# Patient Record
Sex: FEMALE | Race: WHITE | NOT HISPANIC OR LATINO | Employment: OTHER | URBAN - METROPOLITAN AREA
[De-identification: names, ages, dates, MRNs, and addresses within clinical notes are randomized per-mention and may not be internally consistent; named-entity substitution may affect disease eponyms.]

---

## 2017-01-23 ENCOUNTER — TRANSFERRED RECORDS (OUTPATIENT)
Dept: HEALTH INFORMATION MANAGEMENT | Facility: CLINIC | Age: 57
End: 2017-01-23

## 2017-03-22 ENCOUNTER — TRANSFERRED RECORDS (OUTPATIENT)
Dept: HEALTH INFORMATION MANAGEMENT | Facility: CLINIC | Age: 57
End: 2017-03-22

## 2017-03-23 ENCOUNTER — TRANSFERRED RECORDS (OUTPATIENT)
Dept: HEALTH INFORMATION MANAGEMENT | Facility: CLINIC | Age: 57
End: 2017-03-23

## 2017-03-30 ENCOUNTER — TRANSFERRED RECORDS (OUTPATIENT)
Dept: HEALTH INFORMATION MANAGEMENT | Facility: CLINIC | Age: 57
End: 2017-03-30

## 2017-05-23 ENCOUNTER — SURGERY (OUTPATIENT)
Age: 57
End: 2017-05-23

## 2017-05-23 ENCOUNTER — HOSPITAL ENCOUNTER (OUTPATIENT)
Facility: CLINIC | Age: 57
Discharge: HOME | End: 2017-05-23
Attending: COLON & RECTAL SURGERY | Admitting: COLON & RECTAL SURGERY
Payer: COMMERCIAL

## 2017-05-23 VITALS
BODY MASS INDEX: 29.03 KG/M2 | DIASTOLIC BLOOD PRESSURE: 69 MMHG | HEIGHT: 67 IN | WEIGHT: 185 LBS | SYSTOLIC BLOOD PRESSURE: 113 MMHG | RESPIRATION RATE: 28 BRPM | OXYGEN SATURATION: 99 %

## 2017-05-23 LAB — COLONOSCOPY: NORMAL

## 2017-05-23 PROCEDURE — 25000128 H RX IP 250 OP 636: Performed by: COLON & RECTAL SURGERY

## 2017-05-23 PROCEDURE — 25000125 ZZHC RX 250: Performed by: COLON & RECTAL SURGERY

## 2017-05-23 PROCEDURE — G0105 COLORECTAL SCRN; HI RISK IND: HCPCS | Performed by: COLON & RECTAL SURGERY

## 2017-05-23 PROCEDURE — G0121 COLON CA SCRN NOT HI RSK IND: HCPCS | Performed by: COLON & RECTAL SURGERY

## 2017-05-23 PROCEDURE — 45378 DIAGNOSTIC COLONOSCOPY: CPT | Performed by: COLON & RECTAL SURGERY

## 2017-05-23 PROCEDURE — G0500 MOD SEDAT ENDO SERVICE >5YRS: HCPCS | Performed by: COLON & RECTAL SURGERY

## 2017-05-23 RX ORDER — METOCLOPRAMIDE 10 MG/1
10 TABLET ORAL EVERY 6 HOURS PRN
Status: DISCONTINUED | OUTPATIENT
Start: 2017-05-23 | End: 2017-05-23 | Stop reason: HOSPADM

## 2017-05-23 RX ORDER — ONDANSETRON 2 MG/ML
4 INJECTION INTRAMUSCULAR; INTRAVENOUS EVERY 6 HOURS PRN
Status: DISCONTINUED | OUTPATIENT
Start: 2017-05-23 | End: 2017-05-23 | Stop reason: HOSPADM

## 2017-05-23 RX ORDER — FENTANYL CITRATE 50 UG/ML
INJECTION, SOLUTION INTRAMUSCULAR; INTRAVENOUS PRN
Status: DISCONTINUED | OUTPATIENT
Start: 2017-05-23 | End: 2017-05-23 | Stop reason: HOSPADM

## 2017-05-23 RX ORDER — PROCHLORPERAZINE MALEATE 5 MG
5-10 TABLET ORAL EVERY 6 HOURS PRN
Status: DISCONTINUED | OUTPATIENT
Start: 2017-05-23 | End: 2017-05-23 | Stop reason: HOSPADM

## 2017-05-23 RX ORDER — NALOXONE HYDROCHLORIDE 0.4 MG/ML
.1-.4 INJECTION, SOLUTION INTRAMUSCULAR; INTRAVENOUS; SUBCUTANEOUS
Status: DISCONTINUED | OUTPATIENT
Start: 2017-05-23 | End: 2017-05-23 | Stop reason: HOSPADM

## 2017-05-23 RX ORDER — FLUMAZENIL 0.1 MG/ML
0.2 INJECTION, SOLUTION INTRAVENOUS
Status: DISCONTINUED | OUTPATIENT
Start: 2017-05-23 | End: 2017-05-23 | Stop reason: HOSPADM

## 2017-05-23 RX ORDER — ONDANSETRON 4 MG/1
4 TABLET, ORALLY DISINTEGRATING ORAL EVERY 6 HOURS PRN
Status: DISCONTINUED | OUTPATIENT
Start: 2017-05-23 | End: 2017-05-23 | Stop reason: HOSPADM

## 2017-05-23 RX ORDER — LIDOCAINE 40 MG/G
CREAM TOPICAL
Status: DISCONTINUED | OUTPATIENT
Start: 2017-05-23 | End: 2017-05-23 | Stop reason: HOSPADM

## 2017-05-23 RX ORDER — ONDANSETRON 2 MG/ML
4 INJECTION INTRAMUSCULAR; INTRAVENOUS
Status: DISCONTINUED | OUTPATIENT
Start: 2017-05-23 | End: 2017-05-23 | Stop reason: HOSPADM

## 2017-05-23 RX ORDER — METOCLOPRAMIDE HYDROCHLORIDE 5 MG/ML
10 INJECTION INTRAMUSCULAR; INTRAVENOUS EVERY 6 HOURS PRN
Status: DISCONTINUED | OUTPATIENT
Start: 2017-05-23 | End: 2017-05-23 | Stop reason: HOSPADM

## 2017-05-23 RX ADMIN — MIDAZOLAM HYDROCHLORIDE 2 MG: 1 INJECTION, SOLUTION INTRAMUSCULAR; INTRAVENOUS at 08:47

## 2017-05-23 RX ADMIN — FENTANYL CITRATE 50 MCG: 50 INJECTION, SOLUTION INTRAMUSCULAR; INTRAVENOUS at 08:54

## 2017-05-23 RX ADMIN — MIDAZOLAM HYDROCHLORIDE 1 MG: 1 INJECTION, SOLUTION INTRAMUSCULAR; INTRAVENOUS at 08:55

## 2017-05-23 RX ADMIN — FENTANYL CITRATE 100 MCG: 50 INJECTION, SOLUTION INTRAMUSCULAR; INTRAVENOUS at 08:47

## 2017-05-23 NOTE — H&P
Pre-Endoscopy History and Physical   Rosemarie Zabala MRN# [id number]   YOB: 1960 Age: 57 year old   Date of Procedure: 5/23/2017   Primary care provider: Shoshana Bhatti   Type of Endoscopy: colonoscopy   Reason for Procedure: FH of CRC   Type of Anesthesia Anticipated: Moderate Sedation   HPI:   Rosemarie is a 57 year old female with a FH of CRC.  SHe last had a colonoscopy in 2012 which was normal.  She denies BRBPR, abdominal pain, nausea/vomiting, changes in bowel habits or unintentional weight loss.  Her father had CRC at 64.    Allergies   Allergen Reactions     No Known Allergies       Prior to Admission Medications   Prescriptions Last Dose Informant Patient Reported? Taking?   Ascorbic Acid (VITAMIN C PO) 5/17/2017  Yes No   Sig: Take by mouth daily   Cholecalciferol (VITAMIN D) 1000 UNITS capsule 5/17/2017  Yes No   Sig: Take 1 capsule by mouth daily.   Escitalopram Oxalate (LEXAPRO PO) 5/22/2017  Yes Yes   Sig: Take 30 mg by mouth daily   Omega-3 Fatty Acids (OMEGA 3) 1200 MG capsule 5/17/2017  Yes Yes   Sig: Take 1 capsule by mouth 3 times daily.   atenolol (TENORMIN) 25 MG tablet 5/22/2017  No Yes   Sig: Take 1 tablet (25 mg) by mouth daily   atorvastatin (LIPITOR) 20 MG tablet 5/21/2017  No No   Sig: TAKE ONE TABLET BY MOUTH ONE TIME DAILY   calcium carbonate (OS-VALENTIN 500 MG [place]. [place]) 500 MG tablet Unknown  Yes No   Sig: Take 1,000 mg by mouth daily   clonazePAM (KLONOPIN) 0.5 MG tablet 5/22/2017  No Yes   Sig: TAKE ONE TABLET BY MOUTH TWICE DAILY AS NEEDED FOR ANXIETY   Patient taking differently: 0.25 mg daily      Facility-Administered Medications: None      Patient Active Problem List   Diagnosis     Genital herpes     HYPERLIPIDEMIA LDL GOAL <130     Generalized anxiety disorder      Past Medical History:   Diagnosis Date     Calculus of kidney 1994     Essential hypertension, benign     resolved 2014     Generalised anxiety disorder 2011     Genital herpes 2004     Left knee pain 2005  "   left knee cortisone injection     Mixed hyperlipidemia      Other and unspecified malignant neoplasm of skin of other and unspecified parts of face     left cheek, treated with Aldara cream      Past Surgical History:   Procedure Laterality Date     ARTHROSCOPY KNEE      L Knee ACL repair     C REMOVAL OF KIDNEY STONE      L Kidney stone     C/SECTION, LOW TRANSVERSE      x  2     CHOLECYSTECTOMY, LAPOROSCOPIC      precancerous changes and gallstones     COLONOSCOPY  2012    NL. repeat 5 years + family history cancer     COSMETIC ABDOMINOPLASTY  2013     HC REDUCTION OF LARGE BREAST       SALPINGO OOPHORECTOMY,R/L/ARTIE  1981    right oophorectomy for benign tumor      Social History   Substance Use Topics     Smoking status: Never Smoker     Smokeless tobacco: Never Used     Alcohol use Yes      Comment: 3 drinks a week      Family History   Problem Relation Age of Onset     Depression Mother      Breast Cancer Mother      Gynecology Mother       of ovarian cancer     Hypertension Father      Cancer - colorectal Father      at age 64     Lipids Father      Dementia Father      mild      PHYSICAL EXAM:   /76  Resp 16  Ht 1.702 m (5' 7\")  Wt 83.9 kg (185 lb)  SpO2 98%  BMI 28.98 kg/m2 Estimated body mass index is 28.98 kg/(m^2) as calculated from the following:    Height as of this encounter: 1.702 m (5' 7\").    Weight as of this encounter: 83.9 kg (185 lb).   RESP: lungs clear to auscultation - no rales, rhonchi or wheezes   CV: regular rates and rhythm   ASA Class 2 - Mild systemic disease    Assessment: 56 y/o woman with a FH of CRC    Plan: Colonoscopy with moderate sedation.  Risks of the procedure were discussed including, but not limited to, bleeding, perforation and missed lesions.  Patient understands and is willing to proceed.    Declan Gonzales MD ....................  2017   8:46 AM  Colon and Rectal Surgery Staff  [phone]    "

## 2017-07-26 ENCOUNTER — HOSPITAL ENCOUNTER (OUTPATIENT)
Dept: MAMMOGRAPHY | Facility: CLINIC | Age: 57
Discharge: HOME | End: 2017-07-26
Attending: PHYSICIAN ASSISTANT | Admitting: PHYSICIAN ASSISTANT
Payer: COMMERCIAL

## 2017-07-26 DIAGNOSIS — Z12.31 VISIT FOR SCREENING MAMMOGRAM: ICD-10-CM

## 2017-07-26 PROCEDURE — 77063 BREAST TOMOSYNTHESIS BI: CPT

## 2017-08-03 ASSESSMENT — ENCOUNTER SYMPTOMS
DECREASED CONCENTRATION: 0
SYNCOPE: 0
HYPERTENSION: 1
MUSCLE WEAKNESS: 0
ORTHOPNEA: 0
CONSTIPATION: 1
PALPITATIONS: 0
DISTURBANCES IN COORDINATION: 0
NUMBNESS: 1
BACK PAIN: 1
SEIZURES: 0
NECK PAIN: 0
NERVOUS/ANXIOUS: 1
BLOOD IN STOOL: 0
ARTHRALGIAS: 1
INSOMNIA: 1
MYALGIAS: 1
WEAKNESS: 0
STIFFNESS: 1
RECTAL BLEEDING: 0
EXERCISE INTOLERANCE: 0
CLAUDICATION: 1
DIARRHEA: 0
ABDOMINAL PAIN: 0
DIZZINESS: 1
JAUNDICE: 0
JOINT SWELLING: 0
TACHYCARDIA: 0
TREMORS: 0
NAUSEA: 0
SPEECH CHANGE: 0
SLEEP DISTURBANCES DUE TO BREATHING: 0
TINGLING: 1
LOSS OF CONSCIOUSNESS: 0
LIGHT-HEADEDNESS: 0
MEMORY LOSS: 0
HEADACHES: 0
LEG PAIN: 1
PARALYSIS: 0
DEPRESSION: 1
MUSCLE CRAMPS: 1
BLOATING: 0
RECTAL PAIN: 0
VOMITING: 0
PANIC: 0
BOWEL INCONTINENCE: 0
HYPOTENSION: 0
LEG SWELLING: 0
HEARTBURN: 0

## 2017-08-17 ENCOUNTER — OFFICE VISIT (OUTPATIENT)
Dept: ORTHOPEDICS | Facility: CLINIC | Age: 57
End: 2017-08-17

## 2017-08-17 ENCOUNTER — PRE VISIT (OUTPATIENT)
Dept: NEUROLOGY | Facility: CLINIC | Age: 57
End: 2017-08-17

## 2017-08-17 VITALS — WEIGHT: 195.3 LBS | HEIGHT: 67 IN | BODY MASS INDEX: 30.65 KG/M2

## 2017-08-17 DIAGNOSIS — G89.29 CHRONIC LEFT-SIDED LOW BACK PAIN WITHOUT SCIATICA: ICD-10-CM

## 2017-08-17 DIAGNOSIS — M53.3 SI (SACROILIAC) PAIN: Primary | ICD-10-CM

## 2017-08-17 DIAGNOSIS — M54.50 CHRONIC LEFT-SIDED LOW BACK PAIN WITHOUT SCIATICA: ICD-10-CM

## 2017-08-17 NOTE — NURSING NOTE
"Reason For Visit:   Chief Complaint   Patient presents with     RECHECK     mid to low back pain, review recent MRI from [facility]               Pt. States she had an injection 12/6/16 whish made her pain worse    Primary MD: Shoshana Bhatti  Ref. MD: Dr. 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  Work status?  as able.  Date of injury: none    Date of surgery: none    Smoker: No      Ht 1.695 m (5' 6.73\")  Wt 88.6 kg (195 lb 4.8 oz)  BMI 30.83 kg/m2    Pain Assessment  Patient Currently in Pain: Yes  0-10 Pain Scale:  (5 - 10)  Primary Pain Location: Back  Pain Orientation: Lower, Mid  Pain Descriptors: Burning, Constant, Aching  Alleviating Factors: Other (comment) (nothing)  Aggravating Factors: Sitting, Lying, Standing, Walking    Oswestry (MIKO) Questionnaire    OSWESTRY DISABILITY INDEX 8/3/2017   Section 1 - Pain intensity 4   Section 2 - Personal care (washing, dressing, etc.)  1   Section 3 - Lifting 3   Section 4 - Walking 2   Section 5 - Sitting 3   Section 6 - Standing 3   Section 7 - Sleeping 3   Section 8 - Sex life (if applicable) -1   Section 9 - Social life 3   Section 10 - Traveling 2   Count 9   Sum 24   Oswestry Score (%) 53.33   SECTION 1-PAIN INTENSITY The pain is very severe at the moment.   SECTION 2-PERSONAL CARE (WASHING,DRESSING,ETC.) I can look after myself normally but it is very painful.   SECTION 3-LIFTING Pain prevents me from lifting heavy weights but I can manage light to medium weights if they are conveniently positioned.   SECTION 4-WALKING Pain prevents me from walking more than a quarter of a mile.   SECTION 5-SITTING Pain prevents me from sitting for more than half an hour.   SECTION 6-STANDING Pain prevents me from standing for more than half an hour.   SECTION 7-SLEEPING Because of pain I have less than 4 hours sleep.   SECTION 8-SEX LIFE (IF APPLICABLE) Not answered/NA   SECTION 9-SOCIAL LIFE Pain has restricted my social life and I do not go out " as often.   SECTION 10-TRAVELING Pain is bad but I am able to manage trips over two hours.   Oswestry Disability Index: Count 9   MIKO: Total Score = SUM (total for answered questions) 24   Computed Oswestry Score 53.33 (%)   Some recent data might be hidden                    Numeric Rating Scale:  VAS Scores     VAS Survey 8/3/2017   What is your level of back pain during the last week: 8.5   What is your level of RIGHT leg pain during the last week: 4.5   What is your level of LEFT leg pain during the last week: 7.5   What is your level of neck pain during the last week: 0   What is your level of RIGHT arm pain during the last week: 2.5   What is your level of LEFT arm pain during the last week: 4                Promis 10 Assessment    PROMIS 10 8/3/2017   In general, would you say your health is: Good   In general, would you say your quality of life is: Good   In general, how would you rate your physical health? Good   In general, how would you rate your mental health, including your mood and your ability to think? Fair   In general, how would you rate your satisfaction with your social activities and relationships? Fair   In general, please rate how well you carry out your usual social activities and roles Fair   To what extent are you able to carry out your everyday physical activities such as walking, climbing stairs, carrying groceries, or moving a chair? Mostly   How often have you been bothered by emotional problems such as feeling anxious, depressed or irritable? Often   How would you rate your fatigue on average? Moderate   How would you rate your pain on average?   0 = No Pain  to  10 = Worst Imaginable Pain 8   Global Physical Health Score : Raw Score 12 (SUM : G03 - G06 - G07 - G08)   Global Mentall Health Score : Raw Score 9 (SUM : G02 - G04 - G05 - G10)   Total (Physical + Mental Health Score) 21   In general, would you say your health is: 3   In general, would you say your quality of life is: 3   In  general, how would you rate your physical health? 3   In general, how would you rate your mental health, including your mood and your ability to think? 2   In general, how would you rate your satisfaction with your social activities and relationships? 2   In general, please rate how well you carry out your usual social activities and roles. (This includes activities at home, at work and in your community, and responsibilities as a parent, child, spouse, employee, friend, etc.) 2   To what extent are you able to carry out your everyday physical activities such as walking, climbing stairs, carrying groceries, or moving a chair? 4   In the past 7 days, how often have you been bothered by emotional problems such as feeling anxious, depressed, or irritable? 2   In the past 7 days, how would you rate your fatigue on average? 3   In the past 7 days, how would you rate your pain on average, where 0 means no pain, and 10 means worst imaginable pain? 8   Global Mental Health Score 9   Global Physical Health Score 12   PROMIS TOTAL - SUBSCORES 21   Pain question re-calculation - no clinical value 2   Some recent data might be hidden                Jo Ann Lim LPN

## 2017-08-17 NOTE — MR AVS SNAPSHOT
After Visit Summary   8/17/2017    Rosemarie Zabala    MRN: [id number]           Patient Information     Date Of Birth          1960        Visit Information        Provider Department      8/17/2017 8:45 AM Joel Hemphill MD [facility]        Today's Diagnoses     SI (sacroiliac) pain    -  1    Chronic left-sided low back pain without sciatica           Follow-ups after your visit        Additional Services     [facility]       Your provider has referred you to: [facility]. Please call [phone] to make an appointment.     Clinic is located: [facility] [address] [place] [address] [id number]th [place] [address]      Please complete the following questions:    Procedure/Referral: Referral Only -  Comprehensive Evaluation and Management    What is your diagnosis for the patient's pain? Right sided SIJ pain, right L4/5 facet cyst, fact arthropathy    What are your specific questions for the pain specialist? Multimodal eval and treatment    Are there any red flags that may impact the assessment or management of the patient? None    REGARDING OPIOID MEDICATIONS:  We will always address appropriateness of opioid pain medications. We do not prescribe on the patient's first visit and generally will not take on a prescribing role for stable chronic pain. When we do take on prescribing of opioids for chronic pain, it is in collaboration with the referring physician for an determined period of time (usually weeks to months), with an expectation that the primary physician or provider will assume the prescribing role if medications are effective at stable doses with demonstrated compliance.  Therefore, please do not assume that your prescribing responsibilities end on the day of pain clinic consultation.  Is this agreeable to you? YES      Please be aware that coverage of  these services is subject to the terms and limitations of your health insurance plan.  Call member services at your health plan with any benefit or coverage questions.      Please bring the following with you to your appointment or have sent to the [facility]:    (1) Any X-Rays, CTs or MRIs which have been performed that are not in Epic.  Contact the facility where they were done to arrange for  prior to your scheduled appointment.  Any new CT, MRI or other procedures ordered by your specialist must be performed at a [facility] facility or coordinated by your clinic's referral office.    (2) List of current medications   (3) This referral request   (4) Any documents/labs given to you for this referral            NEUROLOGY ADULT REFERRAL       Your provider has referred you for the following:   Consult at PREFERRED PROVIDERS: Farhan Archer MD    Please be aware that coverage of these services is subject to the terms and limitations of your health insurance plan.  Call member services at your health plan with any benefit or coverage questions.      Please bring the following with you to your appointment:    (1) Any X-Rays, CTs or MRIs which have been performed.  Contact the facility where they were done to arrange for  prior to your scheduled appointment.    (2) List of current medications  (3) This referral request   (4) Any documents/labs given to you for this referral                  Your next 10 appointments already scheduled     Aug 24, 2017 11:30 AM CDT   (Arrive by 11:15 AM)   New Patient Visit with Christen Richardson MD   [facility] ([facility])    [address] [place] [address]  4th Floor  [facility] [address]   [phone]            Sep 20, 2017 12:50 PM CDT   (Arrive by 12:35 PM)   New Patient Visit with Lety Feng MD   [facility] ([facility])    [address] [place] [address]  3rd Floor  [place]  "[id number] [phone]              Who to contact     Please call your clinic at [phone] to:    Ask questions about your health    Make or cancel appointments    Discuss your medicines    Learn about your test results    Speak to your doctor   If you have compliments or concerns about an experience at your clinic, or if you wish to file a complaint, please contact [facility] Patient Relations at [phone] or email us at [email or web address]         Additional Information About Your Visit        [employer] Exped[employer] Information     rankdeskt gives you secure access to your electronic health record. If you see a primary care provider, you can also send messages to your care team and make appointments. If you have questions, please call your primary care clinic.  If you do not have a primary care provider, please call [phone] and they will assist you.      Sumbola is an electronic gateway that provides easy, online access to your medical records. With [employer], you can request a clinic appointment, read your test results, renew a prescription or communicate with your care team.     To access your existing account, please contact your [facility] or call [phone] for assistance.        Care EveryWhere ID     This is your Care EveryWhere ID. This could be used by other organizations to access your [facility] medical records  [id number]        Your Vitals Were     Height BMI (Body Mass Index)                1.695 m (5' 6.73\") 30.83 kg/m2           Blood Pressure from Last 3 Encounters:   05/23/17 113/69   02/23/15 128/80   09/12/14 137/85    Weight from Last 3 Encounters:   08/17/17 88.6 kg (195 lb 4.8 oz)   05/23/17 83.9 kg (185 lb)   10/06/16 83.7 kg (184 lb 9.6 oz)              We Performed the Following     [facility]AL     NEUROLOGY ADULT REFERRAL          Today's Medication Changes          These changes " are accurate as of: 8/17/17 10:19 AM.  If you have any questions, ask your nurse or doctor.               These medicines have changed or have updated prescriptions.        Dose/Directions    clonazePAM 0.5 MG tablet   Commonly known as:  klonoPIN   This may have changed:    - how much to take  - additional instructions   Used for:  Generalised anxiety disorder        TAKE ONE TABLET BY MOUTH TWICE DAILY AS NEEDED FOR ANXIETY   Quantity:  40 tablet   Refills:  1                Primary Care Provider Office Phone # Fax #    Shoshana Bhatti PA-C [phone]       FIONA NICOLLET [place] [address]  [facility] [address]        Equal Access to Services     [facility]: Hadii roselyn sauceda hadasho Soomaali, waaxda luqadaha, qaybta kaalmada adeegyada, fariha hoff . So [facility] [phone].    ATENCIÓN: Si habla español, tiene a garcia disposición servicios gratuitos de asistencia lingüística. [facility] [phone].    We comply with applicable federal civil rights laws and [place] laws. We do not discriminate on the basis of race, color, national origin, age, disability sex, sexual orientation or gender identity.            Thank you!     Thank you for choosing [facility]  for your care. Our goal is always to provide you with excellent care. Hearing back from our patients is one way we can continue to improve our services. Please take a few minutes to complete the written survey that you may receive in the mail after your visit with us. Thank you!             Your Updated Medication List - Protect others around you: Learn how to safely use, store and throw away your medicines at www.disposemymeds.org.          This list is accurate as of: 8/17/17 10:19 AM.  Always use your most recent med list.                   Brand Name Dispense Instructions for use Diagnosis    atenolol 25 MG tablet    TENORMIN    90 tablet    Take 1 tablet (25 mg) by mouth daily    Benign essential  hypertension       atorvastatin 20 MG tablet    LIPITOR    90 tablet    TAKE ONE TABLET BY MOUTH ONE TIME DAILY    Hyperlipidemia LDL goal <130       calcium carbonate 1250 MG tablet    OS-VALENTIN 500 mg [place]. [place]     Take 1,000 mg by mouth daily        clonazePAM 0.5 MG tablet    klonoPIN    40 tablet    TAKE ONE TABLET BY MOUTH TWICE DAILY AS NEEDED FOR ANXIETY    Generalised anxiety disorder       LEXAPRO PO      Take 30 mg by mouth daily        omega-3 fatty acids 1200 MG capsule      Take 1 capsule by mouth 3 times daily.        TYLENOL WITH CODEINE #3 PO      Take 2 tablets by mouth At Bedtime        VITAMIN C PO      Take by mouth daily        vitamin D 1000 UNITS capsule      Take 1 capsule by mouth daily.

## 2017-08-17 NOTE — TELEPHONE ENCOUNTER
1.  Date/reason for appt: 9/20/17 Low Extremities Nerve issues  2.  Referring provider: CORY RICHARDS   3.  Call to patient (Yes / No - short description): spoke with    4.  Previous care at / records requested from: [facility]   5.  Other: Faxed request.

## 2017-08-17 NOTE — PROGRESS NOTES
Chief Concern/Diagnosis: Low back pain, right-sided SI joint pain, left leg weakness and numbness    Prior Surgeries:    1. None    Subjective:  Pleasant 57 female returns to clinic today for follow-up of the apprehension. She was last seen in our clinic on October 6, 2016 where she is primarily complaining of right-sided SI joint pain. She did undergo right-sided SI joint injection on December 6, 2016 which she states gives her at least 50% relief however for an unknown time period. In the interim since her last visit, she is also been seen and evaluated by Dr. Zabala and Dr. Verdugo at [facility], who both felt that her primary pain generator was her right SI joint. However, they explained as we had previously, that her insurance does not cover surgical intervention and therefore should continue nonoperative management. At that, she went to [facility] where she underwent what she states as 5 injections of PRP and amniotic fluid at unknown sites. She states that since these injections her pain has been significantly worse, has not improved since the injection, and is now experiencing new pain in her left lower extremity that she had not had previously. She states she's been trying to contact Dr. Femi Razo since these injections however, his office has been evasive in terms of providing information with regard to the procedure she's had.  She notes no improvement of symptoms since this injection with Dr. Razo. She states she has tried multiple attempts at acupuncture, PT, massage, and chiropractic visits.  She is currently taking two tylenol 3 at night and ibuprofen through the day for pain.  Pain is worse with any activity and she cannot identify anything that provides relief.   She states the pain wakes her from sleep and limits her ability to do ADLs, as well as activity of enjoyment including gardening.   And presents today for further evaluation and treatment. She  states her main goals at today's visit are to establish care with [facility] pain clinic and discuss whether or not any surgical intervention can help with her symptoms.  Outside of these complaints, she has no other questions or concerns.      MIKO:  53.33%  VAS:  8.5 back, RLE 4.5, LLE 7.5  [id number]:     General physical health: 12   General mental health:  9    Physical Examination:    Gen:  Alert, no acute distress, well nourished, appears stated age    Psych:  Tearful on exam    Musculoskeletal:     Spine:   Stands erect with no list or stoop   No scoliotic curves grossly evidene         --------------------------------        L2-3: Hip flexion L and R 5/5 strength         L4:  Knee extension L and R 5/5 strength        L5:  Foot / EHL dorsiflexion L and R 5/5 strength        S1:  Plantarflexion  L and R 5/5 strength   Sensation intact to light touch L3-S1 distribution BLE         --------------------------------         Sacroiliac exam:   + Rachael finger   + PSIS tenderness   + Zoë   + AP compression   + Thigh thrust   + Lateral compression   + Gaenslen's   + Sacral thrust    Imaging:  Reviewed printouts of imaging supply from the injection performed by Dr. Razo, based on these images, does appear that she had injections into her L4 5 facet L5-S1 facet bilaterally as well as her right SI joint, review of her MRI does demonstrate a grade 1 anterolisthesis of L3 on 4, as well as cyst at the L3 4 right-sided facet with significant lateral recess stenosis    Assessment/Plan:   57 year old female with the followin. Right SI joint pain  2. Multilevel facet arthrosis  3. Grade 1 spondylolisthesis of L3 on 4  We discussed that although she is having new symptoms, they could be related to injection or to site pathology outside of her right SI joint. We explained to the patient that at this time, there is no insurance approval for SI joint fusion therefore, we recommend further nonoperative  management. We will refer her to her [facility] pain clinic per her request. Furthermore, we recommend continued physical therapy.  We discussed that this patient should contact her elected officials of [place] to discuss her insurance denial for SI joint fusion as she has had no success with this discussion with her insurance company.  We recommend that she consider repeating corticosteroid injection to her right SI joint as well as to her right L3 4 facet as this is the site of the cyst formation.  Lastly, we would like her to undergo neurology evaluation with Dr. Farhan Archer to evaluate the new onset left lower extremity symptoms she is experiencing since her injection, with specific concern for intraneural injection. Referral is placed for neurology as well as the [facility] pain management clinic. Outside of this, she can be weightbearing as tolerated, and follow up on an as-needed basis. She is instructed to call with any questions or concerns. Imaging was reviewed with the patient. A model was used to demonstrate the site of her pathology as well as the technique used for injecting these areas. All of her questions were answered. She is satisfied with the plan as dictated above      Seen and discussed with Dr. Kanchan Trujillo MD MS  Orthopaedic Surgery PGY-4   Pager:  [phone]    I saw and evaluated the patient on the day of the visit and I formulated the plan.  Joel Hemphill MD            Answers for HPI/ROS submitted by the patient on 8/3/2017   General Symptoms: No  Skin Symptoms: No  HENT Symptoms: No  EYE SYMPTOMS: No  HEART SYMPTOMS: Yes  LUNG SYMPTOMS: No  INTESTINAL SYMPTOMS: Yes  URINARY SYMPTOMS: No  GYNECOLOGIC SYMPTOMS: No  BREAST SYMPTOMS: No  SKELETAL SYMPTOMS: Yes  BLOOD SYMPTOMS: No  NERVOUS SYSTEM SYMPTOMS: Yes  MENTAL HEALTH SYMPTOMS: Yes  Chest pain or pressure: No  Fast or irregular heartbeat: No  Pain in legs with walking: Yes  Swelling in feet or  ankles: No  Trouble breathing while lying down: No  Fingers or Toes appear blue: No  High blood pressure: Yes  Low blood pressure: No  Fainting: No  Murmurs: No  Chest pain on exertion: No  Chest pain at rest: No  Cramping pain in leg during exercise: Yes  Pacemaker: No  Varicose veins: No  Edema or swelling: No  Fast heart beat: No  Wake up at night with shortness of breath: No  Heart flutters: No  Light-headedness: No  Exercise intolerance: No  Heart burn or indigestion: No  Nausea: No  Vomiting: No  Abdominal pain: No  Bloating: No  Constipation: Yes  Diarrhea: No  Blood in stool: No  Black stools: No  Rectal or Anal pain: No  Fecal incontinence: No  Rectal bleeding: No  Yellowing of skin or eyes: No  Vomit with blood: No  Change in stools: No  Hemorrhoids: Yes  Back pain: Yes  Muscle aches: Yes  Neck pain: No  Swollen joints: No  Joint pain: Yes  Bone pain: No  Muscle cramps: Yes  Muscle weakness: No  Joint stiffness: Yes  Bone fracture: No  Trouble with coordination: No  Dizziness or trouble with balance: Yes  Fainting or black-out spells: No  Memory loss: No  Headache: No  Seizures: No  Speech problems: No  Tingling: Yes  Tremor: No  Weakness: No  Difficulty walking: No  Paralysis: No  Numbness: Yes  Nervous or Anxious: Yes  Depression: Yes  Trouble sleeping: Yes  Trouble thinking or concentrating: No  Mood changes: Yes  Panic attacks: No

## 2017-08-17 NOTE — LETTER
8/17/2017       RE: Rosemarie Riverause  [address] [place] [address]     Dear Colleague,    Thank you for referring your patient, Rosemarie Zabala, to the [facility] at [facility]. Please see a copy of my visit note below.    Chief Concern/Diagnosis: Low back pain, right-sided SI joint pain, left leg weakness and numbness    Prior Surgeries:    1. None    Subjective:  Pleasant 57 female returns to clinic today for follow-up of the apprehension. She was last seen in our clinic on October 6, 2016 where she is primarily complaining of right-sided SI joint pain. She did undergo right-sided SI joint injection on December 6, 2016 which she states gives her at least 50% relief however for an unknown time period. In the interim since her last visit, she is also been seen and evaluated by Dr. Zabala and Dr. Verdugo at [facility], who both felt that her primary pain generator was her right SI joint. However, they explained as we had previously, that her insurance does not cover surgical intervention and therefore should continue nonoperative management. At that, she went to see [facility] where she underwent what she states as 5 injections of PRP and amniotic fluid at unknown sites. She states that since these injections her pain has been significantly worse, has not improved since the injection, and is now experiencing new pain in her left lower extremity that she had not had previously. She states she's been trying to contact Dr. Femi Razo since these injections however, his office has been evasive in terms of providing information with regard to the procedure she's had.  She notes no improvement of symptoms since this injection with Dr. Razo. She states she has tried multiple attempts at acupuncture, PT, massage, and chiropractic visits.  She is currently taking two tylenol 3 at night and ibuprofen through the day for  pain.  Pain is worse with any activity and she cannot identify anything that provides relief.   She states the pain wakes her from sleep and limits her ability to do ADLs, as well as activity of enjoyment including gardening.   And presents today for further evaluation and treatment. She states her main goals at today's visit are to establish care with [facility] pain clinic and discuss whether or not any surgical intervention can help with her symptoms.  Outside of these complaints, she has no other questions or concerns.      MIKO:  53.33%  VAS:  8.5 back, RLE 4.5, LLE 7.5  [id number]:     General physical health: 12   General mental health:  9    Physical Examination:    Gen:  Alert, no acute distress, well nourished, appears stated age    Psych:  Tearful on exam    Musculoskeletal:     Spine:   Stands erect with no list or stoop   No scoliotic curves grossly evidene         --------------------------------        L2-3: Hip flexion L and R 5/5 strength         L4:  Knee extension L and R 5/5 strength        L5:  Foot / EHL dorsiflexion L and R 5/5 strength        S1:  Plantarflexion  L and R 5/5 strength   Sensation intact to light touch L3-S1 distribution BLE         --------------------------------         Sacroiliac exam:   + Rachael finger   + PSIS tenderness   + Zoë   + AP compression   + Thigh thrust   + Lateral compression   + Gaenslen's   + Sacral thrust    Imaging:  Reviewed printouts of imaging supply from the injection performed by Dr. Razo, based on these images, does appear that she had injections into her L4 5 facet L5-S1 facet bilaterally as well as her right SI joint, review of her MRI does demonstrate a grade 1 anterolisthesis of L3 on 4, as well as cyst at the L3 4 right-sided facet with significant lateral recess stenosis    Assessment/Plan:   57 year old female with the followin. Right SI joint pain  2. Multilevel facet arthrosis  3. Grade 1 spondylolisthesis of L3 on 4  We  discussed that although she is having new symptoms, they could be related to injection or to site pathology outside of her right SI joint. We explained to the patient that at this time, there is no insurance approval for SI joint fusion therefore, we recommend further nonoperative management. We will refer her to her [facility] pain clinic per her request. Furthermore, we recommend continued physical therapy.  We discussed that this patient should contact her elected officials of [place] to discuss her insurance denial for SI joint fusion as she has had no success with this discussion with her insurance company.  We recommend that she consider repeating corticosteroid injection to her right SI joint as well as to her right L3 4 facet as this is the site of the cyst formation.  Lastly, we would like her to undergo neurology evaluation with Dr. Farhan Archer to evaluate the new onset left lower extremity symptoms she is experiencing since her injection, with specific concern for intraneural injection. Referral is placed for neurology as well as the [facility] pain management clinic. Outside of this, she can be weightbearing as tolerated, and follow up on an as-needed basis. She is instructed to call with any questions or concerns. Imaging was reviewed with the patient. A model was used to demonstrate the site of her pathology as well as the technique used for injecting these areas. All of her questions were answered. She is satisfied with the plan as dictated above      Seen and discussed with Dr. Kanchan Trujillo MD MS  Orthopaedic Surgery PGY-4   Pager:  [phone]    I saw and evaluated the patient on the day of the visit and I formulated the plan.  Joel Hemphill MD

## 2017-08-18 ASSESSMENT — ENCOUNTER SYMPTOMS
WEAKNESS: 0
NIGHT SWEATS: 0
LEG PAIN: 0
JOINT SWELLING: 0
DIZZINESS: 1
MUSCLE WEAKNESS: 1
NUMBNESS: 0
NERVOUS/ANXIOUS: 1
FEVER: 0
SPEECH CHANGE: 0
WEIGHT LOSS: 0
NAUSEA: 0
NECK PAIN: 0
JAUNDICE: 0
RECTAL PAIN: 0
TREMORS: 0
LOSS OF CONSCIOUSNESS: 0
VOMITING: 0
STIFFNESS: 1
ABDOMINAL PAIN: 1
DIARRHEA: 0
CLAUDICATION: 0
MYALGIAS: 1
DYSURIA: 0
POLYPHAGIA: 0
MEMORY LOSS: 0
CONSTIPATION: 1
LIGHT-HEADEDNESS: 0
HYPOTENSION: 0
FLANK PAIN: 0
TACHYCARDIA: 0
SEIZURES: 0
SYNCOPE: 0
DEPRESSION: 1
PALPITATIONS: 0
BLOOD IN STOOL: 0
PARALYSIS: 0
ALTERED TEMPERATURE REGULATION: 1
CHILLS: 0
WEIGHT GAIN: 1
DECREASED CONCENTRATION: 1
RECTAL BLEEDING: 0
DECREASED APPETITE: 0
EXERCISE INTOLERANCE: 1
INCREASED ENERGY: 1
PANIC: 0
HEMATURIA: 0
BLOATING: 1
POLYDIPSIA: 0
HEARTBURN: 0
FATIGUE: 1
DISTURBANCES IN COORDINATION: 0
INSOMNIA: 1
ARTHRALGIAS: 1
BOWEL INCONTINENCE: 0
ORTHOPNEA: 0
LEG SWELLING: 0
HALLUCINATIONS: 0
MUSCLE CRAMPS: 0
DIFFICULTY URINATING: 0
HYPERTENSION: 1
HEADACHES: 1
BACK PAIN: 1
SLEEP DISTURBANCES DUE TO BREATHING: 0
TINGLING: 1

## 2017-08-18 ASSESSMENT — ANXIETY QUESTIONNAIRES
GAD7 TOTAL SCORE: 4
5. BEING SO RESTLESS THAT IT IS HARD TO SIT STILL: SEVERAL DAYS
3. WORRYING TOO MUCH ABOUT DIFFERENT THINGS: NOT AT ALL
7. FEELING AFRAID AS IF SOMETHING AWFUL MIGHT HAPPEN: NOT AT ALL
2. NOT BEING ABLE TO STOP OR CONTROL WORRYING: NOT AT ALL
GAD7 TOTAL SCORE: 4
1. FEELING NERVOUS, ANXIOUS, OR ON EDGE: SEVERAL DAYS
GAD7 TOTAL SCORE: 4
4. TROUBLE RELAXING: SEVERAL DAYS
7. FEELING AFRAID AS IF SOMETHING AWFUL MIGHT HAPPEN: NOT AT ALL
6. BECOMING EASILY ANNOYED OR IRRITABLE: SEVERAL DAYS

## 2017-08-19 ASSESSMENT — ANXIETY QUESTIONNAIRES: GAD7 TOTAL SCORE: 4

## 2017-08-21 ENCOUNTER — TELEPHONE (OUTPATIENT)
Dept: ANESTHESIOLOGY | Facility: CLINIC | Age: 57
End: 2017-08-21

## 2017-08-21 ENCOUNTER — PRE VISIT (OUTPATIENT)
Dept: ANESTHESIOLOGY | Facility: CLINIC | Age: 57
End: 2017-08-21

## 2017-08-21 NOTE — TELEPHONE ENCOUNTER
Reminder call placed to pt.   Pt reminded of Provider, date and time of the appointment.   Pt was asked to arrive 15 minutes early to fill out the questionnaires.   Clinic phone number provided if pt needed to reschedule.     Lay Onofre, CMA

## 2017-08-21 NOTE — TELEPHONE ENCOUNTER
1.  Date/reason for appt: 8/24/17 11:30AM Low back and left leg pain  2.  Referring provider: Dr. Trujillo   3.  Call to patient (Yes / No - short description): No, pt was referred.   4.  Previous care at / records requested from:  [facility] Kanchan SAUER -- Recs are in Epic / Images in PACS   [facility] & Dr. Verdugo  - Attempt to fax cover sheet to req. recs  [facility] Dr. Femi Razo - Attempt to fax cover sheet to req. recs   [facility] Lalitha SAUER - Attempt to fax cover sheet to req. recs   [facility]/ MIGUEL Fraser chiropractor Notes   CDI

## 2017-08-22 NOTE — TELEPHONE ENCOUNTER
Records received from [facility].   Included  Office notes: 3/23/17, 3/22/17  Patient messages/phone calls: 3/30/17  Radiology reports: lumbar 3/22/17  MRI lumbar 3/22/17    Missing/needed records: MIGUEL

## 2017-08-23 NOTE — TELEPHONE ENCOUNTER
Called and LM for pt to return my call regarding Chiropractor recs (Nadiya Fraser). - 1st attempt   Faxed cover sheet to  & [facility] to obtain injection notes & recs   Cover sheet faxed to [facility] ([facility]) to req. recs   Faxed cover sheet to [facility] to push any related images

## 2017-08-23 NOTE — TELEPHONE ENCOUNTER
Records received from [facility]/.  Office Notes:   DOS 3/2/16, 10/31/16 with Randa TALAVERA  DOS 4/26/16 with Dr. Timi Briseno  DOS 4/28/16, 6/8/16, 7/13/16, 10/5/16 with Dr. Kranthi Shirley  Radiology reports:  DOS 8/12/10 XR Left Knee - img in pacs  DOS 8/16/10 MRI Left Knee- img in pacs  DOS 5/29/12 MRI L Spine- img in pacs  DOS 7/7/14 XR L Spine - img in pacs  DOS 4/27/16 XR L Spine w/ Felxion- img in pacs  Procedure:  4/26/16 Lumbar Facet Joint Injection     Missing:  [facility] for Injections / Arthrogram  MRI 11/2015 at [facility]  Chiropractor Nadiya Fraser  Injections at [facility]  Pool therapy   Dr. Doty records

## 2017-08-24 ENCOUNTER — OFFICE VISIT (OUTPATIENT)
Dept: ANESTHESIOLOGY | Facility: CLINIC | Age: 57
End: 2017-08-24

## 2017-08-24 VITALS
OXYGEN SATURATION: 97 % | DIASTOLIC BLOOD PRESSURE: 86 MMHG | SYSTOLIC BLOOD PRESSURE: 143 MMHG | WEIGHT: 193.2 LBS | HEART RATE: 65 BPM | HEIGHT: 67 IN | BODY MASS INDEX: 30.32 KG/M2

## 2017-08-24 DIAGNOSIS — M54.16 LUMBAR RADICULOPATHY: Primary | ICD-10-CM

## 2017-08-24 ASSESSMENT — PAIN SCALES - GENERAL: PAINLEVEL: SEVERE PAIN (6)

## 2017-08-24 NOTE — TELEPHONE ENCOUNTER
Pt called me yesterday evening (4:26PM), states she will call Dr. Fraser's office & [facility] to see if they can fax her recs to the [facility] Dept. Pt states she did not go to pool therapy and she has recs at [facility].Faxing over cover sheet to req. Recs. Pt may have to sign ZAKIA forms once she arrives if recs do not come in on time.

## 2017-08-24 NOTE — TELEPHONE ENCOUNTER
Radiology reports received from [facility].  Injection right hip 9/30/16  CT right SI arthrogram with injection 9/21/16, 8/28/14  Right SI joint injection 12/24/15  Right lumbar arthrography corticosteroid and injection 8/14/14  MRI lumbar 7/31/14, 5/31/12  Lumbar facet nerve injection and blockade 12/3/12  Lumbar facet arthrography 8/6/12  Nerve root blockade 6/21/12

## 2017-08-24 NOTE — MR AVS SNAPSHOT
After Visit Summary   8/24/2017    Rosemarie Zabala    MRN: [id number]           Patient Information     Date Of Birth          1960        Visit Information        Provider Department      8/24/2017 11:30 AM Christen Richardson MD [facility] for Comprehensive Pain Management        Care Instructions    1. Call the clinic if you are interested in getting an injection.        Follow up: As needed       To speak with a nurse, schedule/reschedule/cancel a clinic appointment, or request a medication refill call: [phone]     You can also reach us by [employer]: [email or web address]    For refills, please call on Monday, 1 week before your medication runs out. The doctors are not always in clinic, so this gives us time to get your prescriptions ready.  Please let us know the name of the medication you are requesting a refill of.                                   Follow-ups after your visit        Your next 10 appointments already scheduled     Sep 20, 2017 12:50 PM CDT   (Arrive by 12:35 PM)   New Patient Visit with Lety Feng MD   [facility] ([facility])    [address] [place] [id number] [phone]              Who to contact     Please call your clinic at [phone] to:    Ask questions about your health    Make or cancel appointments    Discuss your medicines    Learn about your test results    Speak to your doctor   If you have compliments or concerns about an experience at your clinic, or if you wish to file a complaint, please contact [facility] Patient Relations at [phone] or email us at [email or web address]         Additional Information About Your Visit        MyChart Information     [employer] gives you secure access to your electronic health record. If you see a primary care provider, you can also send messages to your care team and make  "appointments. If you have questions, please call your primary care clinic.  If you do not have a primary care provider, please call [phone] and they will assist you.      [employer] is an electronic gateway that provides easy, online access to your medical records. With [employer], you can request a clinic appointment, read your test results, renew a prescription or communicate with your care team.     To access your existing account, please contact your [facility] or call [phone] for assistance.        Care EveryWhere ID     This is your Care EveryWhere ID. This could be used by other organizations to access your [place] medical records  [id number]        Your Vitals Were     Pulse Height Pulse Oximetry BMI (Body Mass Index)          65 1.702 m (5' 7\") 97% 30.26 kg/m2         Blood Pressure from Last 3 Encounters:   08/24/17 143/86   05/23/17 113/69   02/23/15 128/80    Weight from Last 3 Encounters:   08/24/17 87.6 kg (193 lb 3.2 oz)   08/17/17 88.6 kg (195 lb 4.8 oz)   05/23/17 83.9 kg (185 lb)              Today, you had the following     No orders found for display         Today's Medication Changes          These changes are accurate as of: 8/24/17 11:57 AM.  If you have any questions, ask your nurse or doctor.               These medicines have changed or have updated prescriptions.        Dose/Directions    clonazePAM 0.5 MG tablet   Commonly known as:  klonoPIN   This may have changed:    - how much to take  - additional instructions   Used for:  Generalised anxiety disorder        TAKE ONE TABLET BY MOUTH TWICE DAILY AS NEEDED FOR ANXIETY   Quantity:  40 tablet   Refills:  1                Primary Care Provider Office Phone # Fax #    Shoshana Bhatti PA-C [phone]       [facility] [place] [address]  [facility] [address]        Equal Access to Services     ELIZABETH CHRISTIAN AH: Lisa Issa, britni de los santos, nevin byrd, " fariha ortizkat marc'aan ah. So [facility] [phone].    ATENCIÓN: Si habla radha, tiene a garcia disposición servicios gratuitos de asistencia lingüística. Colin wadsworth [phone].    We comply with applicable federal civil rights laws and [place] laws. We do not discriminate on the basis of race, color, national origin, age, disability sex, sexual orientation or gender identity.            Thank you!     Thank you for choosing [facility]  for your care. Our goal is always to provide you with excellent care. Hearing back from our patients is one way we can continue to improve our services. Please take a few minutes to complete the written survey that you may receive in the mail after your visit with us. Thank you!             Your Updated Medication List - Protect others around you: Learn how to safely use, store and throw away your medicines at www.disposemymeds.org.          This list is accurate as of: 8/24/17 11:57 AM.  Always use your most recent med list.                   Brand Name Dispense Instructions for use Diagnosis    atenolol 25 MG tablet    TENORMIN    90 tablet    Take 1 tablet (25 mg) by mouth daily    Benign essential hypertension       atorvastatin 20 MG tablet    LIPITOR    90 tablet    TAKE ONE TABLET BY MOUTH ONE TIME DAILY    Hyperlipidemia LDL goal <130       calcium carbonate 1250 MG tablet    OS-VALENTIN 500 mg [place]. [place]     Take 1,000 mg by mouth daily        clonazePAM 0.5 MG tablet    klonoPIN    40 tablet    TAKE ONE TABLET BY MOUTH TWICE DAILY AS NEEDED FOR ANXIETY    Generalised anxiety disorder       LEXAPRO PO      Take 30 mg by mouth daily        omega-3 fatty acids 1200 MG capsule      Take 1 capsule by mouth 3 times daily.        TYLENOL WITH CODEINE #3 PO      Take 2 tablets by mouth At Bedtime        VITAMIN C PO      Take by mouth daily        vitamin D 1000 UNITS capsule      Take 1 capsule by mouth daily.

## 2017-08-24 NOTE — PATIENT INSTRUCTIONS
1. Call the clinic if you are interested in getting an injection.        Follow up: As needed       To speak with a nurse, schedule/reschedule/cancel a clinic appointment, or request a medication refill call: [phone]     You can also reach us by [employer]: [email or web address]    For refills, please call on Monday, 1 week before your medication runs out. The doctors are not always in clinic, so this gives us time to get your prescriptions ready.  Please let us know the name of the medication you are requesting a refill of.

## 2017-08-24 NOTE — LETTER
8/24/2017       RE: Rosemarie Zabala  [address] [place] [address]     Dear Colleague,    Thank you for referring your patient, Rosemarie Zabala, to the [facility] FOR COMPREHENSIVE PAIN MANAGEMENT at [facility] Please see a copy of my visit note below.    Rosemarie Zabala is a 57 year old female who complains of low back pain for several years duration and has been attempting to get a lumbosacral fusion done by Dr. Hemphill, but this has been delayed by insurance difficulty.  In the meantime, she went to an outside pain clinic where she had facet injections with platelet rich plasma and human amniotic fluid mixed with her own whole blood.  She said after this incident her back became excruciatingly painful and the pain she has felt shooting down her legs became more promient.  She denies developing any weakness.   The pain is denoted as 7/10 in severity.  The pain is described as shooting pain, parasthesias and numbness.  The pain is alleviated by nothing.  The pain is exacerbated by general activity, and especially sitting for prolonged periods on her sacrum.  Several modalities have been utilized to diminish the pain in the past.  These include SI joint injection (diagnostic LA injection by Dr. Hemphill which did help), facet injections (helped for about 6 months at a time then began to diminish) and LESI which also helped.  The patient Denies any bowel or bladder incontinence.  The patient Denies any subjective weakness.    Current Medications:  Current Outpatient Prescriptions   Medication     Acetaminophen-Codeine (TYLENOL WITH CODEINE #3 PO)     Escitalopram Oxalate (LEXAPRO PO)     atorvastatin (LIPITOR) 20 MG tablet     clonazePAM (KLONOPIN) 0.5 MG tablet     Ascorbic Acid (VITAMIN C PO)     calcium carbonate (OS-VALENTIN 500 MG [place]. [place]) 500 MG tablet     atenolol (TENORMIN) 25 MG tablet     Cholecalciferol (VITAMIN D) 1000 UNITS capsule     Omega-3 Fatty Acids  (OMEGA 3) 1200 MG capsule     No current facility-administered medications for this visit.        Allergies:  Allergies   Allergen Reactions     No Known Allergies        Past Medical History:  Past Medical History:   Diagnosis Date     Calculus of kidney 1994     Essential hypertension, benign     resolved 2014     Generalised anxiety disorder 2011     Genital herpes 2004     Left knee pain 2005    left knee cortisone injection     Mixed hyperlipidemia      Other and unspecified malignant neoplasm of skin of other and unspecified parts of face     left cheek, treated with Aldara cream       Social History:  Social History   Substance Use Topics     Smoking status: Never Smoker     Smokeless tobacco: Never Used     Alcohol use Yes      Comment: 3 drinks a week     Review of Systems:  Constitutional:  NEGATIVE for fever, chills, change in weight  INTEGUMENTARY/SKIN:  NEGATIVE for worrisome rashes, moles or lesions  EYES:  NEGATIVE for vision changes or irritation  ENT/MOUTH:  NEGATIVE for ear, mouth and throat problems  RESP:  NEGATIVE for significant cough or SOB  CV:  NEGATIVE for chest pain, palpitations or peripheral edema  GI:  NEGATIVE for nausea, abdominal pain, heartburn, or change in bowel habits  MUSCULOSKELATAL:  NEGATIVE for significant arthralgias or myalgia  NEURO:  NEGATIVE for weakness, dizziness or paresthesias  ENDOCRINE:  NEGATIVE for temperature intolerance, skin/hair changes  HEME/ALLERGY/IMMUNE:  NEGATIVE for bleeding problems  PSYCHIATRIC:  NEGATIVE for changes in mood or affect    Physical Examination  General Appearance:   Presentation:   Pleasant and Cooperative; looks stated age, no acute distress  Historian:  Good    General Exam:  HEENT:   Normocephalic, atraumatic, sclera, conjunctiva and pharynx normal  Neck:   Supple without adenopathy  Chest:  Clear to auscultation  Heart:  RRR  Abdomen:   Soft, non-tender with good bowel sounds, no masses felt  Extremeties:   No edema, cyanosis or  clubbing, good pulses in all 4 limbs  Skin:  No lesions, warm and dry    Musculoskeletal Exam:    POSTURE:  WNL and Erect  LS SPINE:  Palpation:    Firm, ROM:  Limited, Stabilization:   Correction- full and SLR  PELVIS/HIP GIRDLE:  Up Shift:  right  UPPER EXTREMITY:    Joints:  No Swelling  LOWER EXTREMITY:  Joints:  No Swelling    Neurologic Exam:   MS: Alert and oriented X3, attention, memory and concentration intact, language and speech normal with appropriate fund of knowledge    Psychiatric:   Manner:  Pleasant and appropriate      Radiographs:  MRI lumbar spine 3/2017 from [place] in epic: shows some levels of severe facet arthropathy and foraminal narrowing.     A/P:   Lumbar radiculopathy and facet arthropathy hoping to have fusion in future now hoping to have temporizing injections.  I feel she would benefit from LESI, SI joint injection, facet joint injections/RFA however she would like to discuss with her  and neurologist give her past experience at the outside pain clinic in December.  She will call our clinic to schedule if she pleases.    Christen Richardson MD

## 2017-08-24 NOTE — PROGRESS NOTES
Rosemarie Zabala is a 57 year old female who complains of low back pain for several years duration and has been attempting to get a lumbosacral fusion done by Dr. Hemphill, but this has been delayed by insurance difficulty.  In the meantime, she went to an outside pain clinic where she had facet injections with platelet rich plasma and human amniotic fluid mixed with her own whole blood.  She said after this incident her back became excruciatingly painful and the pain she has felt shooting down her legs became more promient.  She denies developing any weakness.   The pain is denoted as 7/10 in severity.  The pain is described as shooting pain, parasthesias and numbness.  The pain is alleviated by nothing.  The pain is exacerbated by general activity, and especially sitting for prolonged periods on her sacrum.  Several modalities have been utilized to diminish the pain in the past.  These include SI joint injection (diagnostic LA injection by Dr. Hemphill which did help), facet injections (helped for about 6 months at a time then began to diminish) and LESI which also helped.  The patient Denies any bowel or bladder incontinence.  The patient Denies any subjective weakness.    Current Medications:  Current Outpatient Prescriptions   Medication     Acetaminophen-Codeine (TYLENOL WITH CODEINE #3 PO)     Escitalopram Oxalate (LEXAPRO PO)     atorvastatin (LIPITOR) 20 MG tablet     clonazePAM (KLONOPIN) 0.5 MG tablet     Ascorbic Acid (VITAMIN C PO)     calcium carbonate (OS-VALENTIN 500 MG [place]. CA) 500 MG tablet     atenolol (TENORMIN) 25 MG tablet     Cholecalciferol (VITAMIN D) 1000 UNITS capsule     Omega-3 Fatty Acids (OMEGA 3) 1200 MG capsule     No current facility-administered medications for this visit.        Allergies:  Allergies   Allergen Reactions     No Known Allergies        Past Medical History:  Past Medical History:   Diagnosis Date     Calculus of kidney 1994     Essential hypertension, benign     resolved  2014     Generalised anxiety disorder 2011     Genital herpes 2004     Left knee pain 2005    left knee cortisone injection     Mixed hyperlipidemia      Other and unspecified malignant neoplasm of skin of other and unspecified parts of face     left cheek, treated with Aldara cream       Social History:  Social History   Substance Use Topics     Smoking status: Never Smoker     Smokeless tobacco: Never Used     Alcohol use Yes      Comment: 3 drinks a week         Review of Systems:    Constitutional:  NEGATIVE for fever, chills, change in weight  INTEGUMENTARY/SKIN:  NEGATIVE for worrisome rashes, moles or lesions  EYES:  NEGATIVE for vision changes or irritation  ENT/MOUTH:  NEGATIVE for ear, mouth and throat problems  RESP:  NEGATIVE for significant cough or SOB  CV:  NEGATIVE for chest pain, palpitations or peripheral edema  GI:  NEGATIVE for nausea, abdominal pain, heartburn, or change in bowel habits  MUSCULOSKELATAL:  NEGATIVE for significant arthralgias or myalgia  NEURO:  NEGATIVE for weakness, dizziness or paresthesias  ENDOCRINE:  NEGATIVE for temperature intolerance, skin/hair changes  HEME/ALLERGY/IMMUNE:  NEGATIVE for bleeding problems  PSYCHIATRIC:  NEGATIVE for changes in mood or affect    Physical Examination    General Appearance:   Presentation:   Pleasant and Cooperative; looks stated age, no acute distress  Historian:  Good    General Exam:  HEENT:   Normocephalic, atraumatic, sclera, conjunctiva and pharynx normal  Neck:   Supple without adenopathy  Chest:  Clear to auscultation  Heart:  RRR  Abdomen:   Soft, non-tender with good bowel sounds, no masses felt  Extremeties:   No edema, cyanosis or clubbing, good pulses in all 4 limbs  Skin:  No lesions, warm and dry    Musculoskeletal Exam:    POSTURE:  WNL and Erect  LS SPINE:  Palpation:    Firm, ROM:  Limited, Stabilization:   Correction- full and SLR  PELVIS/HIP GIRDLE:  Up Shift:  right  UPPER EXTREMITY:    Joints:  No Swelling  LOWER  EXTREMITY:  Joints:  No Swelling    Neurologic Exam:   MS: Alert and oriented X3, attention, memory and concentration intact, language and speech normal with appropriate fund of knowledge    Psychiatric:   Manner:  Pleasant and appropriate      Radiographs:  MRI lumbar spine 3/2017 from [place] [facility]: shows some levels of severe facet arthropathy and foraminal narrowing.           A/P:     Lumbar radiculopathy and facet arthropathy hoping to have fusion in future now hoping to have temporizing injections.  I feel she would benefit from LESI, SI joint injection, facet joint injections/RFA however she would like to discuss with her  and neurologist give her past experience at the outside pain clinic in December.  She will call our clinic to schedule if she pleases.    Christen Richardson MD      Answers for HPI/ROS submitted by the patient on 8/18/2017   RADHA 7 TOTAL SCORE: 4  PHQ-2 Score: 2  General Symptoms: Yes  Skin Symptoms: No  HENT Symptoms: No  EYE SYMPTOMS: No  HEART SYMPTOMS: Yes  LUNG SYMPTOMS: No  INTESTINAL SYMPTOMS: Yes  URINARY SYMPTOMS: Yes  GYNECOLOGIC SYMPTOMS: No  BREAST SYMPTOMS: No  SKELETAL SYMPTOMS: Yes  BLOOD SYMPTOMS: No  NERVOUS SYSTEM SYMPTOMS: Yes  MENTAL HEALTH SYMPTOMS: Yes  Fever: No  Loss of appetite: No  Weight loss: No  Weight gain: Yes  Fatigue: Yes  Night sweats: No  Chills: No  Increased stress: Yes  Excessive hunger: No  Excessive thirst: No  Feeling hot or cold when others believe the temperature is normal: Yes  Loss of height: No  Post-operative complications: No  Surgical site pain: No  Hallucinations: No  Change in or Loss of Energy: Yes  Hyperactivity: No  Confusion: No  Chest pain or pressure: No  Fast or irregular heartbeat: No  Pain in legs with walking: No  Swelling in feet or ankles: No  Trouble breathing while lying down: No  Fingers or Toes appear blue: No  High blood pressure: Yes  Low blood pressure: No  Fainting: No  Murmurs: No  Chest pain on exertion:  No  Chest pain at rest: No  Cramping pain in leg during exercise: No  Pacemaker: No  Varicose veins: No  Edema or swelling: No  Fast heart beat: No  Wake up at night with shortness of breath: No  Heart flutters: No  Light-headedness: No  Exercise intolerance: Yes  Heart burn or indigestion: No  Nausea: No  Vomiting: No  Abdominal pain: Yes  Bloating: Yes  Constipation: Yes  Diarrhea: No  Blood in stool: No  Black stools: No  Rectal or Anal pain: No  Fecal incontinence: No  Rectal bleeding: No  Yellowing of skin or eyes: No  Vomit with blood: No  Change in stools: No  Hemorrhoids: No  Trouble holding urine or incontinence: Yes  Pain or burning: No  Trouble starting or stopping: No  Increased frequency of urination: No  Blood in urine: No  Decreased frequency of urination: No  Frequent nighttime urination: Yes  Flank pain: No  Difficulty emptying bladder: No  Back pain: Yes  Muscle aches: Yes  Neck pain: No  Swollen joints: No  Joint pain: Yes  Bone pain: No  Muscle cramps: No  Muscle weakness: Yes  Joint stiffness: Yes  Bone fracture: No  Trouble with coordination: No  Dizziness or trouble with balance: Yes  Fainting or black-out spells: No  Memory loss: No  Headache: Yes  Seizures: No  Speech problems: No  Tingling: Yes  Tremor: No  Weakness: No  Difficulty walking: No  Paralysis: No  Numbness: No  Nervous or Anxious: Yes  Depression: Yes  Trouble sleeping: Yes  Trouble thinking or concentrating: Yes  Mood changes: No  Panic attacks: No

## 2017-08-24 NOTE — NURSING NOTE
AVS given and reviewed with pt.  Pt verbalized understanding and declined any questions.     Isabela Tamez, RN, BSN

## 2017-08-28 NOTE — TELEPHONE ENCOUNTER
Records received from [facility].   Included  Office notes: 1/23/17, 12/6/16, 11/16/16  Other: injection 12/6/16

## 2017-08-31 ENCOUNTER — TELEPHONE (OUTPATIENT)
Dept: ANESTHESIOLOGY | Facility: CLINIC | Age: 57
End: 2017-08-31

## 2017-08-31 NOTE — TELEPHONE ENCOUNTER
LPN called pt, per their question about other treatments that the clinic could offer.     LPN reviewed Dr. Richardson's note from their clinic appointment-  It is noted that pt has had previous injections from outside pain clinic's. Dr. Richardson states that the pt is hoping to have fusions in the future but could benefit from temporizing injections: LESI, SI joint injection, facet joint injections/RFA. Pt wasn't interested in scheduling injections at the time of the appointment but was given the contact information to do so in the future.      LIDAN reviewed Dr. Richardson's plan with the pt, whom had no other questions to ask. Pt stated that they were going to follow up with their neurologist for evaluation of potential nerve damage (As a result from past injections) and would get back to clinic staff if they were wanting to schedule an injection.     Pt has the clinic's phone number and was asked to reach out when they were needing our serviced.     Sonali Vega LPN

## 2017-08-31 NOTE — TELEPHONE ENCOUNTER
----- Message from Jenna Ayers sent at 8/31/2017  1:16 PM CDT -----  Regarding: Pt of Dr. Richardson - Pt wants some clearification on process in the pain clinic.   Contact: [phone]  Pt of Dr. Richardson - Pt wants some clearification on process in the pain clinic. She was offered an injection and she was wondering if there's anything else that could be done bc she's had an injection before. She is confused. Pt can be reached at [phone].    Thank you,  Jenna DOS SANTOS

## 2017-09-11 NOTE — TELEPHONE ENCOUNTER
Records received from [facility].  Images are in PACS   Included  Office notes: 3/23/17, 3/22/17  Radiology reports: lumbar spine 3/22/17  MRI lumbar 3/22/17

## 2017-09-15 ASSESSMENT — ENCOUNTER SYMPTOMS
DECREASED CONCENTRATION: 0
JAUNDICE: 0
DIARRHEA: 0
DEPRESSION: 1
NIGHT SWEATS: 0
TREMORS: 0
RECTAL BLEEDING: 0
ARTHRALGIAS: 1
ABDOMINAL PAIN: 1
WEIGHT LOSS: 0
BOWEL INCONTINENCE: 0
VOMITING: 0
RECTAL PAIN: 0
MYALGIAS: 1
PARALYSIS: 0
CLAUDICATION: 1
NECK PAIN: 0
HEADACHES: 0
NERVOUS/ANXIOUS: 1
MEMORY LOSS: 0
ORTHOPNEA: 0
PALPITATIONS: 0
BACK PAIN: 1
HALLUCINATIONS: 0
LOSS OF CONSCIOUSNESS: 0
HEARTBURN: 0
INSOMNIA: 1
DIZZINESS: 1
CHILLS: 0
STIFFNESS: 1
JOINT SWELLING: 0
SEIZURES: 0
NAUSEA: 0
TINGLING: 1
DISTURBANCES IN COORDINATION: 0
HYPERTENSION: 1
LEG SWELLING: 0
MUSCLE WEAKNESS: 1
POLYPHAGIA: 0
FATIGUE: 1
FEVER: 0
POLYDIPSIA: 0
LEG PAIN: 1
ALTERED TEMPERATURE REGULATION: 1
CONSTIPATION: 1
HYPOTENSION: 0
WEAKNESS: 0
INCREASED ENERGY: 0
SYNCOPE: 0
NUMBNESS: 1
SPEECH CHANGE: 0
EXERCISE INTOLERANCE: 0
SLEEP DISTURBANCES DUE TO BREATHING: 0
PANIC: 0
WEIGHT GAIN: 1
MUSCLE CRAMPS: 0
LIGHT-HEADEDNESS: 0
BLOATING: 1
DECREASED APPETITE: 0
TACHYCARDIA: 0
BLOOD IN STOOL: 0

## 2017-09-20 ENCOUNTER — OFFICE VISIT (OUTPATIENT)
Dept: NEUROLOGY | Facility: CLINIC | Age: 57
End: 2017-09-20

## 2017-09-20 VITALS
HEART RATE: 56 BPM | WEIGHT: 193 LBS | HEIGHT: 67 IN | SYSTOLIC BLOOD PRESSURE: 131 MMHG | DIASTOLIC BLOOD PRESSURE: 75 MMHG | BODY MASS INDEX: 30.29 KG/M2

## 2017-09-20 DIAGNOSIS — M79.605 PAIN OF LEFT LOWER EXTREMITY: Primary | ICD-10-CM

## 2017-09-20 RX ORDER — HYDROCODONE BITARTRATE AND ACETAMINOPHEN 5; 325 MG/1; MG/1
TABLET ORAL
COMMUNITY
Start: 2017-08-30 | End: 2018-07-13

## 2017-09-20 RX ORDER — CLONAZEPAM 0.5 MG/1
0.25 TABLET ORAL EVERY MORNING
COMMUNITY
Start: 2016-06-08

## 2017-09-20 NOTE — MR AVS SNAPSHOT
After Visit Summary   9/20/2017    Rosemarie Zabala    MRN: [id number]           Patient Information     Date Of Birth          1960        Visit Information        Provider Department      9/20/2017 12:50 PM Lety Feng MD [facility] Neurology        Today's Diagnoses     Pain of left lower extremity    -  1      Care Instructions    It was a pleasure to see you in clinic today.    After discussing your ongoing left leg and back pains, we decided on the following plan of action.    - Please make appointment to have EMG of your left leg performed.  - Please make appointment to follow-up in neurology clinic 5-7 days after the EMG is performed.     Please call into clinic/message on DEXMA if any concerns/questions in the interim.    Lety Feng MD  Neurology resident           Follow-ups after your visit        Follow-up notes from your care team     Return in about 2 months (around 11/20/2017) for Lab and procedure results.      Future tests that were ordered for you today     Open Future Orders        Priority Expected Expires Ordered    EMG Routine  1/20/2018 9/20/2017            Who to contact     Please call your clinic at [phone] to:    Ask questions about your health    Make or cancel appointments    Discuss your medicines    Learn about your test results    Speak to your doctor   If you have compliments or concerns about an experience at your clinic, or if you wish to file a complaint, please contact [facility] Patient Relations at [phone] or email us at [email or web address]         Additional Information About Your Visit        QPDharFetch It Information     DEXMA gives you secure access to your electronic health record. If you see a primary care provider, you can also send messages to your care team and make appointments. If you have questions, please call your primary care clinic.  If you do not have a primary care provider, please  "call [phone] and they will assist you.      Leatt is an electronic gateway that provides easy, online access to your medical records. With Leatt, you can request a clinic appointment, read your test results, renew a prescription or communicate with your care team.     To access your existing account, please contact your [facility] or call [phone] for assistance.        Care EveryWhere ID     This is your Care EveryWhere ID. This could be used by other organizations to access your [facility] medical records  [id number]        Your Vitals Were     Pulse Height BMI (Body Mass Index)             56 1.702 m (5' 7\") 30.23 kg/m2          Blood Pressure from Last 3 Encounters:   09/20/17 131/75   08/24/17 143/86   05/23/17 113/69    Weight from Last 3 Encounters:   09/20/17 87.5 kg (193 lb)   08/24/17 87.6 kg (193 lb 3.2 oz)   08/17/17 88.6 kg (195 lb 4.8 oz)                 Today's Medication Changes          These changes are accurate as of: 9/20/17  2:11 PM.  If you have any questions, ask your nurse or doctor.               These medicines have changed or have updated prescriptions.        Dose/Directions    clonazePAM 0.25 mg Tabs half-tab   Commonly known as:  klonoPIN   This may have changed:  Another medication with the same name was removed. Continue taking this medication, and follow the directions you see here.   Changed by:  Lety Feng MD        Refills:  0         Stop taking these medicines if you haven't already. Please contact your care team if you have questions.     TYLENOL WITH CODEINE #3 PO   Stopped by:  Lety Feng MD                    Primary Care Provider Office Phone # Fax #    Shoshana Bhatti PA-C [phone]       [address] [place] [address]  [facility] [address]        Equal Access to Services     ELIZABETH CHRISTIAN AH: Lisa Issa, waaxda luqadaha, qaybta kaalmavaleria byrd, fariha kapadia " donnie hoff ah. So [facility] [phone].    ATENCIÓN: Si habla radha, tiene a garcia disposición servicios gratuitos de asistencia lingüística. Colin al [phone].    We comply with applicable federal civil rights laws and [place] laws. We do not discriminate on the basis of race, color, national origin, age, disability sex, sexual orientation or gender identity.            Thank you!     Thank you for choosing [facility]  for your care. Our goal is always to provide you with excellent care. Hearing back from our patients is one way we can continue to improve our services. Please take a few minutes to complete the written survey that you may receive in the mail after your visit with us. Thank you!             Your Updated Medication List - Protect others around you: Learn how to safely use, store and throw away your medicines at www.disposemymeds.org.          This list is accurate as of: 9/20/17  2:11 PM.  Always use your most recent med list.                   Brand Name Dispense Instructions for use Diagnosis    atenolol 25 MG tablet    TENORMIN    90 tablet    Take 1 tablet (25 mg) by mouth daily    Benign essential hypertension       atorvastatin 20 MG tablet    LIPITOR    90 tablet    TAKE ONE TABLET BY MOUTH ONE TIME DAILY    Hyperlipidemia LDL goal <130       calcium carbonate 1250 MG tablet    OS-VALENTIN 500 mg [place]. Ca     Take 1,000 mg by mouth daily        clonazePAM 0.25 mg Tabs half-tab    klonoPIN          HYDROcodone-acetaminophen 5-325 MG    NORCO          LEXAPRO PO      Take 30 mg by mouth daily        omega-3 fatty acids 1200 MG capsule      Take 1 capsule by mouth 3 times daily.        VITAMIN C PO      Take by mouth daily        vitamin D 1000 UNITS capsule      Take 1 capsule by mouth daily.

## 2017-09-20 NOTE — PROGRESS NOTES
DEPARTMENT OF NEUROLOGY    Patient Name:  Rosemarie Zabala  MRN:  [id number]    :  1960  Date of Clinic Visit:  2017  Primary Care Provider:  Shoshana Bhatti    CHIEF COMPLAINT: leg pain    HISTORY OF PRESENT ILLNESS:  Rosemarie Zabala is a 57 year old female, history of back pain and depression, presenting with ongoing leg pain. History is taken from the patient and her  was also present for this visit. They state the following.    The patient notes a five-year history of overall back and leg pain. She says that her first pains involved lower back pain that started 5 years ago appeared to be positional. Initial workup at that time she says was indicative of ongoing arthritis in her lower spine. At that time she had some radiation down to the upper portion of her right lower extremity. She'll she largely did okay with this pain. She was followed by several doctors outside the system. She says she had numerous epidural injections, including steroid injections into the facets with good relief. Overall, she says her life was not particularly affected by these pains though they were bothersome.    Over the course of the next couple of years, however she does state that the pains did worsen some in the steroid injections stopped working as well. Roughly 2 years ago she noted a worsening particularly of the lower back that continued. This letter to see an outside orthopedist in 2016 where she had which she states are 5 injections PRP with amniotic fluid of the right sided sacroiliac joints bilaterally at L4 and L5 and L5-S1. Per available notes, she may have had up to 30% pain relief and some days. However, she says that she never truly had relief from this procedure and thinks that she has had worsening pain ever since. She has attempted to contact this outside clinic several times, but states that she is not a good experience and feel she does not have proper answers from them and  as such has elected not to pursue further cares.    What is notable from this interaction at the outside orthopedist, is that she states afterwards her right leg was largely okay, however she started to have severe pains down her left leg. He started the level of her lower buttocks and radiate all the way down to the toes. This pain is nearly constant and she says has hearing tears at some point. It is severely restricted her activities of daily living; she is to walk up to 3-5 miles per day and now no longer does, she also is to enjoy extensive gardening at her home and has been unable to do this for more than 10-20 minutes at a time the summer. She does worry this pain is been steadily increasing since these injections.    The pain itself is described as a burning, sharp pain with tingling and numbing at the toes. As above, she is now on Norco for the pain and uses she states only at night. She cannot name any clear exacerbating or relieving factors save for certain positions when she attempts to garden or with extended rest at home. The pain is largely worse at night.    She has also been seen at the [facility] in March 2017. It was thought that it was likely her sacroiliac joints that was the center of her pain. At that time, she had failed trials of acupuncture, visits to the chiropractor, physical therapy, a rhizotomy trial, work with a , and use of nonsteroidals. She initially refused to use any opiates, but is now currently on Norco. Workup at the [place] included an MRI done several months following these injections and was thought to show lumbar spondylosis, and L4 on 5 grade 1 spondylolisthesis, lumbago versus SI joint pain. She says that ultimately was recommended that she have surgery but that her insurance would not pay and therefore she has been unable to move forward in her evaluation and care.    Patient's pain management schedule currently is as follows:  -Ibuprofen at home p.r.n. for  pain  -Norco 2 tabs at bedtime. She states she uses basically every day.    Patient has been seen by the pain clinic [place] [facility] [place]. She says that they are likely to do another steroid epidural injection. There was some initial concern due to her previous PRP with amniotic fluid, but that the steroid injection should likely not cause any trouble and they will be likely able to proceed.      REVIEW OF SYSTEMS:  A 10 point review of symptoms is negative except as indicated above.    ALLERGIES:  Allergies   Allergen Reactions     No Known Allergies      MEDICATIONS:  Current Outpatient Prescriptions   Medication Sig Dispense Refill     HYDROcodone-acetaminophen (NORCO) 5-325 MG        clonazePAM (KLONOPIN) 0.25 mg TABS half-tab        Escitalopram Oxalate (LEXAPRO PO) Take 30 mg by mouth daily       atorvastatin (LIPITOR) 20 MG tablet TAKE ONE TABLET BY MOUTH ONE TIME DAILY 90 tablet 0     Ascorbic Acid (VITAMIN C PO) Take by mouth daily       calcium carbonate (OS-VALENTIN 500 MG [place]. [place]) 500 MG tablet Take 1,000 mg by mouth daily       atenolol (TENORMIN) 25 MG tablet Take 1 tablet (25 mg) by mouth daily 90 tablet 3     Cholecalciferol (VITAMIN D) 1000 UNITS capsule Take 1 capsule by mouth daily.       Omega-3 Fatty Acids (OMEGA 3) 1200 MG capsule Take 1 capsule by mouth 3 times daily.       PAST MEDICAL HISTORY:  Past Medical History:   Diagnosis Date     Calculus of kidney 1994     Essential hypertension, benign     resolved 2014     Generalised anxiety disorder 2011     Genital herpes 2004     Left knee pain 2005    left knee cortisone injection     Mixed hyperlipidemia      Other and unspecified malignant neoplasm of skin of other and unspecified parts of face     left cheek, treated with Aldara cream     PAST SURGICAL HISTORY:  Past Surgical History:   Procedure Laterality Date     ARTHROSCOPY KNEE  2003    L Knee ACL repair     C REMOVAL OF KIDNEY STONE  1994    L Kidney stone     C/SECTION, LOW TRANSVERSE    "   x  2     CHOLECYSTECTOMY, LAPOROSCOPIC      precancerous changes and gallstones     COLONOSCOPY  2012    NL. repeat 5 years + family history cancer     COLONOSCOPY N/A 2017    Procedure: COLONOSCOPY;  COLONOSCOPY;  Surgeon: Declan Gonzales MD;  Location:  GI     COSMETIC ABDOMINOPLASTY  2013      REDUCTION OF LARGE BREAST       SALPINGO OOPHORECTOMY,R/L/ARTIE  1981    right oophorectomy for benign tumor     SOCIAL HISTORY:  Social History     Social History     Marital status:      Spouse name: Joel     Number of children: 2     Years of education: 15     Occupational History     [email or web address]     Social History Main Topics     Smoking status: Never Smoker     Smokeless tobacco: Never Used     Alcohol use Yes      Comment: 3 drinks a week     Drug use: No     Sexual activity: Yes     Partners: Male      Comment:   had vasectomy     Other Topics Concern      Service No     Blood Transfusions No     Caffeine Concern No     2 cups coffee per day     Occupational Exposure No     Hobby Hazards No     Sleep Concern No     Stress Concern Yes     death of Mom      Weight Concern No     Special Diet No     Back Care No     Exercise Yes     5-7 times per week - walking     Bike Helmet Yes     Seat Belt Yes     Self-Exams Yes     Parent/Sibling W/ Cabg, Mi Or Angioplasty Before 65f 55m? No     Social History Narrative     FAMILY HISTORY:  Family History   Problem Relation Age of Onset     Depression Mother      Breast Cancer Mother      Gynecology Mother       of ovarian cancer     Hypertension Father      Cancer - colorectal Father      at age 64     Lipids Father      Dementia Father      mild         PHYSICAL EXAMINATION:    Vitals:   /75  Pulse 56  Ht 1.702 m (5' 7\")  Wt 87.5 kg (193 lb)  BMI 30.23 kg/m2    -General: Woman sitting comfortably on the chair. No acute distress    -HEENT: No skin discolorations noted, no carotid bruits     -Chest: RRR " without murmurs or bruits     -Abdomen: Positive bowel sounds, soft, non-tender, no organomegaly    -Musculoskeletal: No abnormalities noted. Able to perform get up and go test.    -Neurological:     --MS: Patient is alert, attentive, and oriented. Speech is clear and fluid. Names normally. Registration, recall and remote memory intact. Mental math normal.     --CNs: Visual fields are full to confrontation. Normal fundoscopic exam with no visualized vascular changes. Pupils are briskly reactive to light. Visual fields full. Ocular motility full without nystagmus, facial sensation intact, muscles of mastication and facial expression normal, hearing intact, gag and palate elevation normal, sternomastoid and trapezius function normal, tongue motions normal     --Motor: Normal muscle tone and bulk. No atrophy or twitches. Strength is 5 out of 5 in the upper extremities and symmetric. Also 5 out of 5 in the lower extremity at the hip, knee, and ankle. She shows 4+ out of 5 strength in the left lower extremity with hip flexion and extension, knee flexion and extension, and dorsiflexion/plantarflexion.    --Reflexes: Brisk reflexes at knees and biceps and ankles. Plantar responses flexor bilaterally.    --Sensory: Light touch, vibration and PP intact bilaterally in upper and lower extremities    --Coordination: Rapidly alternating movements of fingers intact. Heel-shin and finger-nose-finger is intact. Negative Romberg.     --Gait: Stands with feet normally spaced. Gait is steady. Able to walk on toes, heels, and perform tandem gait.       INVESTIGATIONS:   All available and relevant labs, imaging, and other procedures were reviewed with the patient at this visit.      IMPRESSION AND RECOMMENDATIONS:  57 year old female, history of back pain and depression, presenting with ongoing leg pain. Patient describes five-year period of leg pain initially treated to arthritis and spondylosis of the lower spinal column that was  initially not adversely affecting her activities of daily living and well controlled on over-the-counter pain medications and intermittent epidural steroid injections. Due to some worsening, she then had a procedure in December 2016 of PRP with amniotic fluid after which she notes progressive increase in pain over the left leg, and area the previously was not bothersome. She has dealt with this pain now for nearly a year and it does appear to have negatively affected her daily life. Examination is largely within normal limits; she may have some relative weakness diffusely over the lower left extremity, however she is baseline right-handed and right footed. Would be somewhat atypical given the described timeline for her to have continued worsening of pain. Given the location of the injections, if there were to bend any initial negative effects of these injections done, we would expect some stabilization of her pain rather than the ongoing worsening that she is describing. It is somewhat reassuring that MRI is done at the [facility] in March, several months after these injections, were not shown to have any unexpected major abnormalities; we note however that actual images were not available for our review at this visit, only printouts of the official radiologist read at the [facility]. We do agree with the ongoing pain management at pain clinic. She is also attempting to work with orthopedic surgery to perform a fusion surgery. From neurology standpoint, we would not recommend against either at this time. We can perform an EMG to ensure that there is no ongoing neuropathy related to these injections. We would otherwise defer ongoing pain management to the pain clinic. We discussed this with the patient and her  registered their understanding and agreement with the plan. They're welcome to return to clinic as necessary if there would have any new changes or worsening of her pain. She is otherwise to follow up  5-7 days after completion of her EMG.     Patient was seen and discussed with attending neurologist, Dr. Rajeev Weebr.    Lety Feng MD  [facility] Department of Neurology PGY3  Pager: [phone]          Neurology Attending Note:    I have seen and examined the patient with the resident.  I have reviewed the labs and imaging results available.  I agree with the assessment and plan.    Rajeev Weber MD

## 2017-09-20 NOTE — LETTER
2017       RE: Rosemarie Zabala  [address] [place] [address]     Dear Colleague,    Thank you for referring your patient, Rosemarie Zabala, to the [facility] NEUROLOGY at [facility]. Please see a copy of my visit note below.      DEPARTMENT OF NEUROLOGY    Patient Name:  Rosemarie Zabala  MRN:  [id number]    :  1960  Date of Clinic Visit:  2017  Primary Care Provider:  Shoshana Bhatti    CHIEF COMPLAINT: leg pain    HISTORY OF PRESENT ILLNESS:  Rosemarie Zabala is a 57 year old female, history of back pain and depression, presenting with ongoing leg pain. History is taken from the patient and her  was also present for this visit. They state the following.    The patient notes a five-year history of overall back and leg pain. She says that her first pains involved lower back pain that started 5 years ago appeared to be positional. Initial workup at that time she says was indicative of ongoing arthritis in her lower spine. At that time she had some radiation down to the upper portion of her right lower extremity. She'll she largely did okay with this pain. She was followed by several doctors outside the system. She says she had numerous epidural injections, including steroid injections into the facets with good relief. Overall, she says her life was not particularly affected by these pains though they were bothersome.    Over the course of the next couple of years, however she does state that the pains did worsen some in the steroid injections stopped working as well. Roughly 2 years ago she noted a worsening particularly of the lower back that continued. This letter to see an outside orthopedist in 2016 where she had which she states are 5 injections PRP with amniotic fluid of the right sided sacroiliac joints bilaterally at L4 and L5 and L5-S1. Per available notes, she may have had up to 30% pain relief and some days. However,  she says that she never truly had relief from this procedure and thinks that she has had worsening pain ever since. She has attempted to contact this outside clinic several times, but states that she is not a good experience and feel she does not have proper answers from them and as such has elected not to pursue further cares.    What is notable from this interaction at the outside orthopedist, is that she states afterwards her right leg was largely okay, however she started to have severe pains down her left leg. He started the level of her lower buttocks and radiate all the way down to the toes. This pain is nearly constant and she says has hearing tears at some point. It is severely restricted her activities of daily living; she is to walk up to 3-5 miles per day and now no longer does, she also is to enjoy extensive gardening at her home and has been unable to do this for more than 10-20 minutes at a time the summer. She does worry this pain is been steadily increasing since these injections.    The pain itself is described as a burning, sharp pain with tingling and numbing at the toes. As above, she is now on Norco for the pain and uses she states only at night. She cannot name any clear exacerbating or relieving factors save for certain positions when she attempts to garden or with extended rest at home. The pain is largely worse at night.    She has also been seen at the [facility] in March 2017. It was thought that it was likely her sacroiliac joints that was the center of her pain. At that time, she had failed trials of acupuncture, visits to the chiropractor, physical therapy, a rhizotomy trial, work with a , and use of nonsteroidals. She initially refused to use any opiates, but is now currently on Norco. Workup at the [facility] included an MRI done several months following these injections and was thought to show lumbar spondylosis, and L4 on 5 grade 1 spondylolisthesis, lumbago versus SI  joint pain. She says that ultimately was recommended that she have surgery but that her insurance would not pay and therefore she has been unable to move forward in her evaluation and care.    Patient's pain management schedule currently is as follows:  -Ibuprofen at home p.r.n. for pain  -Norco 2 tabs at bedtime. She states she uses basically every day.    Patient has been seen by the pain clinic here at [facility] recently. She says that they are likely to do another steroid epidural injection. There was some initial concern due to her previous PRP with amniotic fluid, but that the steroid injection should likely not cause any trouble and they will be likely able to proceed.      REVIEW OF SYSTEMS:  A 10 point review of symptoms is negative except as indicated above.    ALLERGIES:  Allergies   Allergen Reactions     No Known Allergies      MEDICATIONS:  Current Outpatient Prescriptions   Medication Sig Dispense Refill     HYDROcodone-acetaminophen (NORCO) 5-325 MG        clonazePAM (KLONOPIN) 0.25 mg TABS half-tab        Escitalopram Oxalate (LEXAPRO PO) Take 30 mg by mouth daily       atorvastatin (LIPITOR) 20 MG tablet TAKE ONE TABLET BY MOUTH ONE TIME DAILY 90 tablet 0     Ascorbic Acid (VITAMIN C PO) Take by mouth daily       calcium carbonate (OS-VALENTIN 500 MG [place]. CA) 500 MG tablet Take 1,000 mg by mouth daily       atenolol (TENORMIN) 25 MG tablet Take 1 tablet (25 mg) by mouth daily 90 tablet 3     Cholecalciferol (VITAMIN D) 1000 UNITS capsule Take 1 capsule by mouth daily.       Omega-3 Fatty Acids (OMEGA 3) 1200 MG capsule Take 1 capsule by mouth 3 times daily.       PAST MEDICAL HISTORY:  Past Medical History:   Diagnosis Date     Calculus of kidney 1994     Essential hypertension, benign     resolved 2014     Generalised anxiety disorder 2011     Genital herpes 2004     Left knee pain 2005    left knee cortisone injection     Mixed hyperlipidemia      Other and unspecified malignant neoplasm of skin of other  and unspecified parts of face     left cheek, treated with Aldara cream     PAST SURGICAL HISTORY:  Past Surgical History:   Procedure Laterality Date     ARTHROSCOPY KNEE      L Knee ACL repair     C REMOVAL OF KIDNEY STONE      L Kidney stone     C/SECTION, LOW TRANSVERSE      x  2     CHOLECYSTECTOMY, LAPOROSCOPIC      precancerous changes and gallstones     COLONOSCOPY  2012    NL. repeat 5 years + family history cancer     COLONOSCOPY N/A 2017    Procedure: COLONOSCOPY;  COLONOSCOPY;  Surgeon: Declan Gonzales MD;  Location:  GI     COSMETIC ABDOMINOPLASTY        REDUCTION OF LARGE BREAST       SALPINGO OOPHORECTOMY,R/L/ARTIE  1981    right oophorectomy for benign tumor     SOCIAL HISTORY:  Social History     Social History     Marital status:      Spouse name: Joel     Number of children: 2     Years of education: 15     Occupational History     [employer]     Social History Main Topics     Smoking status: Never Smoker     Smokeless tobacco: Never Used     Alcohol use Yes      Comment: 3 drinks a week     Drug use: No     Sexual activity: Yes     Partners: Male      Comment:   had vasectomy     Other Topics Concern      Service No     Blood Transfusions No     Caffeine Concern No     2 cups coffee per day     Occupational Exposure No     Hobby Hazards No     Sleep Concern No     Stress Concern Yes     death of Mom 2009     Weight Concern No     Special Diet No     Back Care No     Exercise Yes     5-7 times per week - walking     Bike Helmet Yes     Seat Belt Yes     Self-Exams Yes     Parent/Sibling W/ Cabg, Mi Or Angioplasty Before 65f 55m? No     Social History Narrative     FAMILY HISTORY:  Family History   Problem Relation Age of Onset     Depression Mother      Breast Cancer Mother      Gynecology Mother       of ovarian cancer     Hypertension Father      Cancer - colorectal Father      at age 64     Lipids Father      Dementia  "Father      mild         PHYSICAL EXAMINATION:    Vitals:   /75  Pulse 56  Ht 1.702 m (5' 7\")  Wt 87.5 kg (193 lb)  BMI 30.23 kg/m2    -General: Woman sitting comfortably on the chair. No acute distress    -HEENT: No skin discolorations noted, no carotid bruits     -Chest: RRR without murmurs or bruits     -Abdomen: Positive bowel sounds, soft, non-tender, no organomegaly    -Musculoskeletal: No abnormalities noted. Able to perform get up and go test.    -Neurological:     --MS: Patient is alert, attentive, and oriented. Speech is clear and fluid. Names normally. Registration, recall and remote memory intact. Mental math normal.     --CNs: Visual fields are full to confrontation. Normal fundoscopic exam with no visualized vascular changes. Pupils are briskly reactive to light. Visual fields full. Ocular motility full without nystagmus, facial sensation intact, muscles of mastication and facial expression normal, hearing intact, gag and palate elevation normal, sternomastoid and trapezius function normal, tongue motions normal     --Motor: Normal muscle tone and bulk. No atrophy or twitches. Strength is 5 out of 5 in the upper extremities and symmetric. Also 5 out of 5 in the lower extremity at the hip, knee, and ankle. She shows 4+ out of 5 strength in the left lower extremity with hip flexion and extension, knee flexion and extension, and dorsiflexion/plantarflexion.    --Reflexes: Brisk reflexes at knees and biceps and ankles. Plantar responses flexor bilaterally.    --Sensory: Light touch, vibration and PP intact bilaterally in upper and lower extremities    --Coordination: Rapidly alternating movements of fingers intact. Heel-shin and finger-nose-finger is intact. Negative Romberg.     --Gait: Stands with feet normally spaced. Gait is steady. Able to walk on toes, heels, and perform tandem gait.       INVESTIGATIONS:   All available and relevant labs, imaging, and other procedures were reviewed with " the patient at this visit.      IMPRESSION AND RECOMMENDATIONS:  57 year old female, history of back pain and depression, presenting with ongoing leg pain. Patient describes five-year period of leg pain initially treated to arthritis and spondylosis of the lower spinal column that was initially not adversely affecting her activities of daily living and well controlled on over-the-counter pain medications and intermittent epidural steroid injections. Due to some worsening, she then had a procedure in December 2016 of PRP with amniotic fluid after which she notes progressive increase in pain over the left leg, and area the previously was not bothersome. She has dealt with this pain now for nearly a year and it does appear to have negatively affected her daily life. Examination is largely within normal limits; she may have some relative weakness diffusely over the lower left extremity, however she is baseline right-handed and right footed. Would be somewhat atypical given the described timeline for her to have continued worsening of pain. Given the location of the injections, if there were to bend any initial negative effects of these injections done, we would expect some stabilization of her pain rather than the ongoing worsening that she is describing. It is somewhat reassuring that MRI is done at the [facility] in March, several months after these injections, were not shown to have any unexpected major abnormalities; we note however that actual images were not available for our review at this visit, only printouts of the official radiologist read at the [facility]. We do agree with the ongoing pain management at [facility]. She is also attempting to work with orthopedic surgery to perform a fusion surgery. From neurology standpoint, we would not recommend against either at this time. We can perform an EMG to ensure that there is no ongoing neuropathy related to these injections. We would otherwise defer ongoing  pain management to the pain clinic. We discussed this with the patient and her  registered their understanding and agreement with the plan. They're welcome to return to clinic as necessary if there would have any new changes or worsening of her pain. She is otherwise to follow up 5-7 days after completion of her EMG.     Patient was seen and discussed with attending neurologist, Dr. Rajeev Weber.    Lety Feng MD  [facility] Department of Neurology PGY3  Pager: [phone]          Neurology Attending Note:    I have seen and examined the patient with the resident.  I have reviewed the labs and imaging results available.  I agree with the assessment and plan.    Rajeev Weber MD          Again, thank you for allowing me to participate in the care of your patient.      Sincerely,    Lety Feng MD

## 2017-09-20 NOTE — PATIENT INSTRUCTIONS
It was a pleasure to see you in clinic today.    After discussing your ongoing left leg and back pains, we decided on the following plan of action.    - Please make appointment to have EMG of your left leg performed.  - Please make appointment to follow-up in neurology clinic 5-7 days after the EMG is performed.     Please call into clinic/message on [facility] if any concerns/questions in the interim.    Lety Feng MD  Neurology resident

## 2017-09-20 NOTE — NURSING NOTE
Chief Complaint   Patient presents with     Consult     low extremities nerve      Daphne JOHNSTON MA

## 2017-10-31 ENCOUNTER — OFFICE VISIT (OUTPATIENT)
Dept: NEUROLOGY | Facility: CLINIC | Age: 57
End: 2017-10-31

## 2017-10-31 DIAGNOSIS — M79.605 PAIN OF LEFT LOWER EXTREMITY: ICD-10-CM

## 2017-10-31 NOTE — PROGRESS NOTES
[facility]  Electrodiagnostic Laboratory    Nerve Conduction & EMG Report          Patient:       Rosemarie Zabala  Patient ID:    [id number]  Gender:        Female  YOB: 1960  Age:           57 Years 6 Months      Referring Physicians: Rajeev Weber MD and Lety Feng MD    History & Examination:    57 year old woman with left back pain radiating to the lower extremity down to the foot. Query left lumbosacral radiculopathy.    Techniques: Motor and sensory conduction studies were done with surface recording electrodes. Temperature was monitored and recorded throughout the study. Lower extremities were maintained at a temperature of 31degrees Centigrade or higher. EMG was done with a concentric needle electrode.     Results:    Bilateral sural and the left superficial peroneal antidromic sensory NCSs were normal. Bilateral deep peroneal and tibial motor NCSs were normal. Left tibial F-wave latencies were normal. EMG of left tibialis anterior, medial gastrocnemius, vastus lateralis, biceps femoris (short head), gluteus medius and L5 paraspinals was normal. Assessment of spontaneous activity at the paraspinals was difficult due to incomplete relaxation.     Interpretation:    Normal study. No electrodiagnostic evidence of left lumbosacral radiculopathy.     EMG Physician:    Trip Whyte MD       Sensory NCS      Nerve / Sites Rec. Site Onset Peak NP Amp Ref. PP Amp Dist Navdeep Ref. Temp     ms ms  V  V  V cm m/s m/s  C   L SURAL - Lat Mall 60      Calf Ankle 2.92 3.75 20.6 5.0 19.6 14 48.0 38.0 30.2   R SURAL - Lat Mall 60      Calf Ankle 3.18 4.01 18.7 5.0 15.8 14 44.1 38.0 32.1      Calf Ankle 3.23 4.11 20.4 5.0 13.7 14 43.4  32.1   L SUP PERONEAL      Lat Leg Anderson 2.71 3.59 7.2  7.2 12.5 46.2 38.0 29.3       Motor NCS      Nerve / Sites Rec. Site Lat Ref. Amp Ref. Rel Amp Dist Navdeep Ref. Dur. Area Temp.     ms ms mV mV % cm m/s m/s ms %  C   L DEEP PERONEAL - EDB      Ankle EDB  3.59 6.00 6.4 2.5 100 8   6.98 100 30.3      FibHead EDB 11.20  6.4  99.8 35 46.0 38.0 7.40 96.9 29.9      Pop Fos EDB 12.66  6.2  97.1 8 54.9 38.0 7.55 97.5 29.9   R DEEP PERONEAL - EDB      Ankle EDB 4.38 6.00 3.0 2.5 100 8   8.18 100 32.1      FibHead EDB 11.46  2.6  85.7 36 50.8 38.0 8.49 79.9 32.1      Pop Fos EDB 13.02  2.6  88 9 57.6 38.0 8.80 84.9 32   L TIBIAL - AH      Ankle AH 4.17 6.00 15.8 4.0 100 8   7.81 100 30.8      Pop Fos AH 12.29  11.9  75.4 42 51.7 38.0 8.59 98.5 30.4   R TIBIAL - AH      Ankle AH 4.32 6.00 9.0 4.0 100 8   6.72 100 29.8       F  Wave      Nerve Min F Lat Max F Lat Mean FLat Temp.    ms ms ms  C   L TIBIAL 50.78 55.00 52.56 30.1       EMG Summary Table     Spontaneous MUAP Recruitment    IA Fib PSW Fasc H.F. Amp Dur. PPP Pattern   L. TIB ANTERIOR N None None None None N N N N   L. GASTROCN (MED) N None None None None N N N N   L. VAST LATERALIS N None None None None N N N N   L. BIC FEM (S HEAD) N None None None None N N N N   L. GLUTEUS MED N None None None None N N N N   L. L5 PSP Incomplete relaxation None None None None

## 2017-10-31 NOTE — MR AVS SNAPSHOT
After Visit Summary   10/31/2017    Rosemarie Zabala    MRN: [id number]           Patient Information     Date Of Birth          1960        Visit Information        Provider Department      10/31/2017 2:30 PM Trip Whyte MD  Health EMG        Today's Diagnoses     Pain of left lower extremity           Follow-ups after your visit        Future tests that were ordered for you today     Open Future Orders        Priority Expected Expires Ordered    Needle EMG Each Extremity w/Paraspinal Area Complete (09506) Routine  10/31/2018 10/31/2017            Who to contact     Please call your clinic at [phone] to:    Ask questions about your health    Make or cancel appointments    Discuss your medicines    Learn about your test results    Speak to your doctor   If you have compliments or concerns about an experience at your clinic, or if you wish to file a complaint, please contact [facility] Patient Relations at [phone] or email us at [email or web address]         Additional Information About Your Visit        MyChart Information     [employer] gives you secure access to your electronic health record. If you see a primary care provider, you can also send messages to your care team and make appointments. If you have questions, please call your primary care clinic.  If you do not have a primary care provider, please call [phone] and they will assist you.      [employer] is an electronic gateway that provides easy, online access to your medical records. With [employer], you can request a clinic appointment, read your test results, renew a prescription or communicate with your care team.     To access your existing account, please contact your [facility] Clinic or call [phone] for assistance.        Care EveryWhere ID     This is your Care EveryWhere ID. This could be used by other organizations to access your  [place] medical records  [id number]         Blood Pressure from Last 3 Encounters:   09/20/17 131/75   08/24/17 143/86   05/23/17 113/69    Weight from Last 3 Encounters:   09/20/17 87.5 kg (193 lb)   08/24/17 87.6 kg (193 lb 3.2 oz)   08/17/17 88.6 kg (195 lb 4.8 oz)              We Performed the Following     EMG     HC NCS MOTOR W OR W/O F-WAVE, 7 OR 8        Primary Care Provider Office Phone # Fax #    Shoshana Bhatti PA-C [phone]       [facility] [address]  [facility] [address]        Equal Access to Services     ELIZABETH CHRISTIAN : Hadii aad ku hadasho Soisabel, waaxda luqadaha, qaybta kaalmada adeegyada, fariha hoff . So [facility] [phone].    ATENCIÓN: Si habla español, tiene a garcia disposición servicios gratuitos de asistencia lingüística. [facility] [phone].    We comply with applicable federal civil rights laws and [place] laws. We do not discriminate on the basis of race, color, national origin, age, disability, sex, sexual orientation, or gender identity.            Thank you!     Thank you for choosing [facility]  for your care. Our goal is always to provide you with excellent care. Hearing back from our patients is one way we can continue to improve our services. Please take a few minutes to complete the written survey that you may receive in the mail after your visit with us. Thank you!             Your Updated Medication List - Protect others around you: Learn how to safely use, store and throw away your medicines at www.disposemymeds.org.          This list is accurate as of: 10/31/17  3:25 PM.  Always use your most recent med list.                   Brand Name Dispense Instructions for use Diagnosis    atenolol 25 MG tablet    TENORMIN    90 tablet    Take 1 tablet (25 mg) by mouth daily    Benign essential hypertension       atorvastatin 20 MG tablet    LIPITOR    90 tablet    TAKE ONE TABLET BY MOUTH ONE TIME DAILY     Hyperlipidemia LDL goal <130       calcium carbonate 1250 MG tablet    OS-VALENTIN 500 mg [place]. Ca     Take 1,000 mg by mouth daily        clonazePAM 0.25 mg Tabs half-tab    klonoPIN          HYDROcodone-acetaminophen 5-325 MG    NORCO          LEXAPRO PO      Take 30 mg by mouth daily        omega-3 fatty acids 1200 MG capsule      Take 1 capsule by mouth 3 times daily.        VITAMIN C PO      Take by mouth daily        vitamin D 1000 UNITS capsule      Take 1 capsule by mouth daily.

## 2017-10-31 NOTE — LETTER
10/31/2017       RE: Rosemarie Zabala  [address] [place] [address]     Dear Colleague,    Thank you for referring your patient, Rosemarie Zabala, to the [facility] EMG at [facility]. Please see a copy of my visit note below.        [facility]  Electrodiagnostic Laboratory    Nerve Conduction & EMG Report          Patient:       Rosemarie Zabala  Patient ID:    [id number]  Gender:        Female  YOB: 1960  Age:           57 Years 6 Months      Referring Physicians: Rajeev Weber MD and Lety Feng MD    History & Examination:    57 year old woman with left back pain radiating to the lower extremity down to the foot. Query left lumbosacral radiculopathy.    Techniques: Motor and sensory conduction studies were done with surface recording electrodes. Temperature was monitored and recorded throughout the study. Lower extremities were maintained at a temperature of 31degrees Centigrade or higher. EMG was done with a concentric needle electrode.     Results:    Bilateral sural and the left superficial peroneal antidromic sensory NCSs were normal. Bilateral deep peroneal and tibial motor NCSs were normal. Left tibial F-wave latencies were normal. EMG of left tibialis anterior, medial gastrocnemius, vastus lateralis, biceps femoris (short head), gluteus medius and L5 paraspinals was normal. Assessment of spontaneous activity at the paraspinals was difficult due to incomplete relaxation.     Interpretation:    Normal study. No electrodiagnostic evidence of left lumbosacral radiculopathy.     EMG Physician:    Trip Whyte MD       Sensory NCS      Nerve / Sites Rec. Site Onset Peak NP Amp Ref. PP Amp Dist Navdeep Ref. Temp     ms ms  V  V  V cm m/s m/s  C   L SURAL - Lat Mall 60      Calf Ankle 2.92 3.75 20.6 5.0 19.6 14 48.0 38.0 30.2   R SURAL - Lat Mall 60      Calf Ankle 3.18 4.01 18.7 5.0 15.8 14 44.1 38.0 32.1      Calf Ankle 3.23 4.11  20.4 5.0 13.7 14 43.4  32.1   L SUP PERONEAL      Lat Leg Anderson 2.71 3.59 7.2  7.2 12.5 46.2 38.0 29.3       Motor NCS      Nerve / Sites Rec. Site Lat Ref. Amp Ref. Rel Amp Dist Navdeep Ref. Dur. Area Temp.     ms ms mV mV % cm m/s m/s ms %  C   L DEEP PERONEAL - EDB      Ankle EDB 3.59 6.00 6.4 2.5 100 8   6.98 100 30.3      FibHead EDB 11.20  6.4  99.8 35 46.0 38.0 7.40 96.9 29.9      Pop Fos EDB 12.66  6.2  97.1 8 54.9 38.0 7.55 97.5 29.9   R DEEP PERONEAL - EDB      Ankle EDB 4.38 6.00 3.0 2.5 100 8   8.18 100 32.1      FibHead EDB 11.46  2.6  85.7 36 50.8 38.0 8.49 79.9 32.1      Pop Fos EDB 13.02  2.6  88 9 57.6 38.0 8.80 84.9 32   L TIBIAL - AH      Ankle AH 4.17 6.00 15.8 4.0 100 8   7.81 100 30.8      Pop Fos AH 12.29  11.9  75.4 42 51.7 38.0 8.59 98.5 30.4   R TIBIAL - AH      Ankle AH 4.32 6.00 9.0 4.0 100 8   6.72 100 29.8       F  Wave      Nerve Min F Lat Max F Lat Mean FLat Temp.    ms ms ms  C   L TIBIAL 50.78 55.00 52.56 30.1       EMG Summary Table     Spontaneous MUAP Recruitment    IA Fib PSW Fasc H.F. Amp Dur. PPP Pattern   L. TIB ANTERIOR N None None None None N N N N   L. GASTROCN (MED) N None None None None N N N N   L. VAST LATERALIS N None None None None N N N N   L. BIC FEM (S HEAD) N None None None None N N N N   L. GLUTEUS MED N None None None None N N N N   L. L5 PSP Incomplete relaxation None None None None                             Again, thank you for allowing me to participate in the care of your patient.      Sincerely,    Trip Whyte MD

## 2017-11-06 ENCOUNTER — TELEPHONE (OUTPATIENT)
Dept: ORTHOPEDICS | Facility: CLINIC | Age: 57
End: 2017-11-06

## 2017-11-06 DIAGNOSIS — M53.3 SI (SACROILIAC) JOINT DYSFUNCTION: Primary | ICD-10-CM

## 2017-11-14 ENCOUNTER — HOSPITAL ENCOUNTER (OUTPATIENT)
Facility: AMBULATORY SURGERY CENTER | Age: 57
End: 2017-11-14

## 2017-11-14 ENCOUNTER — SURGERY (OUTPATIENT)
Age: 57
End: 2017-11-14

## 2017-11-14 VITALS
OXYGEN SATURATION: 98 % | RESPIRATION RATE: 20 BRPM | WEIGHT: 193 LBS | SYSTOLIC BLOOD PRESSURE: 143 MMHG | DIASTOLIC BLOOD PRESSURE: 89 MMHG | TEMPERATURE: 97.2 F | HEIGHT: 67 IN | BODY MASS INDEX: 30.29 KG/M2 | HEART RATE: 65 BPM

## 2017-11-14 RX ORDER — LIDOCAINE HYDROCHLORIDE 10 MG/ML
INJECTION, SOLUTION EPIDURAL; INFILTRATION; INTRACAUDAL; PERINEURAL PRN
Status: DISCONTINUED | OUTPATIENT
Start: 2017-11-14 | End: 2017-11-14 | Stop reason: HOSPADM

## 2017-11-14 RX ORDER — BUPIVACAINE HYDROCHLORIDE 2.5 MG/ML
INJECTION, SOLUTION EPIDURAL; INFILTRATION; INTRACAUDAL PRN
Status: DISCONTINUED | OUTPATIENT
Start: 2017-11-14 | End: 2017-11-14 | Stop reason: HOSPADM

## 2017-11-14 RX ORDER — TRIAMCINOLONE ACETONIDE 40 MG/ML
INJECTION, SUSPENSION INTRA-ARTICULAR; INTRAMUSCULAR PRN
Status: DISCONTINUED | OUTPATIENT
Start: 2017-11-14 | End: 2017-11-14 | Stop reason: HOSPADM

## 2017-11-14 RX ADMIN — BUPIVACAINE HYDROCHLORIDE 4 ML: 2.5 INJECTION, SOLUTION EPIDURAL; INFILTRATION; INTRACAUDAL at 09:26

## 2017-11-14 RX ADMIN — TRIAMCINOLONE ACETONIDE 40 MG: 40 INJECTION, SUSPENSION INTRA-ARTICULAR; INTRAMUSCULAR at 09:28

## 2017-11-14 RX ADMIN — LIDOCAINE HYDROCHLORIDE 6 ML: 10 INJECTION, SOLUTION EPIDURAL; INFILTRATION; INTRACAUDAL; PERINEURAL at 09:27

## 2017-11-14 NOTE — OP NOTE
Patient: Rosemarie Zabala Age: 57 year old   MRN: [id number] Attending: Dr. Richardson     Date of Visit: November 14, 2017      PAIN MEDICINE CLINIC PROCEDURE NOTE        PREPROCEDURE DIAGNOSES:  1.  Bilateral low back pain   2.  Sacroiliitis - bilateral    POSTPROCEDURE DIAGNOSES:  1.  Bilateral low back pain   2.  Sacroiliitis - bilateral      PROCEDURE(S) PERFORMED:  1.  Bilateral sacroiliac joint injection  2.  Bilateral Sacroiliac joint arthrography  3.  Fluoroscopic guidance for the above-named procedure(s)      ANESTHESIA:  Local.    BLOOD LOSS:  Minimal.    DRAINS AND SPECIMENS:  None.    COMPLICATIONS:  None.    INDICATIONS:  Rosemarie Zabala is a 57 year old female with a history of  chronic low back pain secondary to sacrolitis .  The patient stated that the patient was in their usual state of health and denied recent anticoagulant use or recent infections.  Therefore, the plan is to perform above mentioned procedures.     Procedure Details:  The patient was met in the procedure room, where the patient was identified by name, medical record number and date of birth.  All of the patient s last minute questions were answered. Written informed consent was obtained and saved in the electronic medical record, after the risks, benefits, and alternatives were discussed with the patient.      A formal time-out procedure was performed, as per protocol, including patient name, title of procedure, and site of procedure, and all in the room concurred.  Routine monitors were applied.      The patient was placed in the prone position on the procedure room table.  All pressure points were checked and comfortably padded.  Routine monitors were placed.  Vital signs were stable.    A chlorhexidine prep was completed followed by sterile draping per standard procedure.     AP fluoroscopic guidance was used to identify the right SI joint(s), with slight contralateral oblique tilt, until the joint was maximally visualized.    After 1% lidocaine infiltration using a 25 gauge 1.5 inch needle, a 3.5 inch spinal needle was introduced and advanced through the anesthetized plane and advanced to the joint space.  Depth was confirmed with lateral fluoroscopic guidance. After negative aspiration for heme, we injected 0.5 ml of Isovue contrast into SI joint. Images obtained.      After negative aspiration for heme, 2.5 mL of a treatment mixture containing 1 mL of triamcinolone (40 mg/ml) and 4 mL of bupivacaine 0.25% was injected into the SI joint, then the needle was slightly withdrawn and 2.5 mL of the above treatment solution was injected into the periarticular space.  The needle was then removed.      Light pressure was held at the puncture site(s) to prevent ecchymosis and oozing.  The patient's skin was cleansed, and hemostasis was confirmed.  Band-aids were applied to the needle injection site(s).      The above procedure was then performed with the exact same steps on the left side.      Condition:    The patient remained awake and alert throughout the procedure.  The patient tolerated the procedure well and was monitored for approximately 15 minutes afterward in the post procedure area.  There were no immediate post procedure complications noted.  The patient was then discharged to home as per protocol.      Pre-procedure pain score: 7/10  Post-procedure pain score: 3/10

## 2017-11-14 NOTE — IP AVS SNAPSHOT
MRN:[id number]                      After Visit Summary   11/14/2017    Rosemarie Zabala    MRN: [id number]           Thank you!     Thank you for choosing [facility] for your care. Our goal is always to provide you with excellent care. Hearing back from our patients is one way we can continue to improve our services. Please take a few minutes to complete the written survey that you may receive in the mail after you visit with us. Thank you!        Patient Information     Date Of Birth          1960        About your hospital stay     You were admitted on:  November 14, 2017 [facility]    You were discharged on:  November 14, 2017       Who to Call     For medical emergencies, please call 911.  For non-urgent questions about your medical care, please call your primary care provider or clinic, [phone]  For questions related to your surgery, please call your surgery clinic         Primary Care Provider Office Phone # Fax #    Shoshana Bhatti PA-C [phone]      Further instructions from your care team       Home Care Instructions after a Sacroiliac Joint Injection    The sacroiliac joints lie next to the spine and connect the sacrum(the bottom of the spine) with the hip on both sides. There are two sacroiliac joints, one on the right and one on the left. Joint inflammation and/or dysfunction in this area can cause pain. In a sacroiliac joint injection, a local anesthetic (numbing medicine) is injected in or near the joint space. Steroids are often used to help with the anti-inflammatory process.     Activity  -You may resume most normal activity levels with the exception of strenuous activity. It is important for us to know if your pain with normal activity is relieved after this injection.  -DO NOT shower for 24 hours  -DO NOT remove bandaid for 24 hours    Pain  -You may experience soreness at the injection site for  one or two days  -You may use an ice pack for 20 minutes every 2 hours for the first 24 hours  -You may use a heating pad after the first 24 hours  -You may use Tylenol (acetaminophen) every 4 hours or other pain medicines as     directed by your physician    You may experience numbness radiating into your legs. This numbness may last several hours. Until sensation returns to normal; please use caution in walking, climbing stairs, and stepping out of your vehicle, etc.    DID YOU RECEIVE SEDATION TODAY?  No    DID YOU RECEIVE STEROIDS TODAY?  Yes    Common side effects of steroids:  Not everyone will experience corticosteroid side effects. If side effects are experienced, they will gradually subside in the 7-10 day period following an injection. Most common side effects include:  -Flushed face and/or chest  -Feeling of warmth, particularly in the face but could be an overall feeling of warmth  -Increased blood sugar in diabetic patients  -Menstrual irregularities my occur. If taking hormone-based birth control an alternate method of birth control is recommended  -Sleep disturbances and/or mood swings are possible  -Leg cramps      PLEASE KEEP TRACK OF YOUR SYMPTOMS AND NOTE YOUR IMPROVEMENT FOR YOUR DOCTOR.     Please contact us if you have:  -Severe pain  -Fever more than 101.5 degrees Fahrenheit  -Signs of infection at the injection site (redness, swelling, or drainage)    If you have questions, please contact our office at [phone] between the hours of 7:00 am and 3:00 pm Monday through Friday. After office hours you can contact the on call provider by dialing [phone]. If you need immediate attention, we recommend that you go to a hospital emergency room or dial [phone].      Pending Results     Date and Time Order Name Status Description    11/14/2017 0859 XR SURGERY DARREN FLUORO LESS THAN 5 MIN W STILLS In process             Admission Information     Date & Time Department Dept. Phone    11/14/2017 [place]  "[facility] [phone]      Your Vitals Were     Blood Pressure Pulse Temperature Respirations Height Weight    137/79 65 97.2  F (36.2  C) (Temporal) 20 1.702 m (5' 7\") 87.5 kg (193 lb)    Last Period Pulse Oximetry BMI (Body Mass Index)             12/14/2012 97% 30.23 kg/m2         Alerta[employer] Information     [employer] gives you secure access to your electronic health record. If you see a primary care provider, you can also send messages to your care team and make appointments. If you have questions, please call your primary care clinic.  If you do not have a primary care provider, please call [phone] and they will assist you.      [employer] is an electronic gateway that provides easy, online access to your medical records. With [employer], you can request a clinic appointment, read your test results, renew a prescription or communicate with your care team.     To access your existing account, please contact your [facility] or call [phone] for assistance.        Care EveryWhere ID     This is your Care EveryWhere ID. This could be used by other organizations to access your [place] medical records  [id number]        Equal Access to Services     ELIZABETH CHRISTIAN : Hadii roselyn Issa, waaxda lujoel, qaybta kaalmada carson, fariha de paz. So [facility] [phone].    ATENCIÓN: Si habla español, tiene a garcia disposición servicios gratuitos de asistencia lingüística. Llame al [phone].    We comply with applicable federal civil rights laws and [place] laws. We do not discriminate on the basis of race, color, national origin, age, disability, sex, sexual orientation, or gender identity.               Review of your medicines      UNREVIEWED medicines. Ask your doctor about these medicines        Dose / Directions    atenolol 25 MG tablet   Commonly known as:  TENORMIN   Used for:  Benign essential hypertension        Dose: "  25 mg   Take 1 tablet (25 mg) by mouth daily   Quantity:  90 tablet   Refills:  3       atorvastatin 20 MG tablet   Commonly known as:  LIPITOR   Used for:  Hyperlipidemia LDL goal <130        TAKE ONE TABLET BY MOUTH ONE TIME DAILY   Quantity:  90 tablet   Refills:  0       calcium carbonate 1250 MG tablet   Commonly known as:  OS-VALENTIN 500 mg [place]. Ca        Dose:  1000 mg   Take 1,000 mg by mouth daily   Refills:  0       clonazePAM 0.25 mg Tabs half-tab   Commonly known as:  klonoPIN        Refills:  0       HYDROcodone-acetaminophen 5-325 MG   Commonly known as:  NORCO        Refills:  0       IBUPROFEN PO        Dose:  600 mg   Take 600 mg by mouth   Refills:  0       LEXAPRO PO        Dose:  30 mg   Take 30 mg by mouth daily   Refills:  0       omega-3 fatty acids 1200 MG capsule        Dose:  1 capsule   Take 1 capsule by mouth 3 times daily.   Refills:  0       VITAMIN C PO        Take by mouth daily   Refills:  0       vitamin D 1000 UNITS capsule        Dose:  1 capsule   Take 1 capsule by mouth daily.   Refills:  0                Protect others around you: Learn how to safely use, store and throw away your medicines at www.disposemymeds.org.             Medication List: This is a list of all your medications and when to take them. Check marks below indicate your daily home schedule. Keep this list as a reference.      Medications           Morning Afternoon Evening Bedtime As Needed    atenolol 25 MG tablet   Commonly known as:  TENORMIN   Take 1 tablet (25 mg) by mouth daily                                atorvastatin 20 MG tablet   Commonly known as:  LIPITOR   TAKE ONE TABLET BY MOUTH ONE TIME DAILY                                calcium carbonate 1250 MG tablet   Commonly known as:  OS-VALENTIN 500 mg [place]. Ca   Take 1,000 mg by mouth daily                                clonazePAM 0.25 mg Tabs half-tab   Commonly known as:  klonoPIN                                HYDROcodone-acetaminophen 5-325 MG    Commonly known as:  NORCO                                IBUPROFEN PO   Take 600 mg by mouth                                LEXAPRO PO   Take 30 mg by mouth daily                                omega-3 fatty acids 1200 MG capsule   Take 1 capsule by mouth 3 times daily.                                VITAMIN C PO   Take by mouth daily                                vitamin D 1000 UNITS capsule   Take 1 capsule by mouth daily.

## 2017-11-14 NOTE — DISCHARGE INSTRUCTIONS
Home Care Instructions after a Sacroiliac Joint Injection    The sacroiliac joints lie next to the spine and connect the sacrum(the bottom of the spine) with the hip on both sides. There are two sacroiliac joints, one on the right and one on the left. Joint inflammation and/or dysfunction in this area can cause pain. In a sacroiliac joint injection, a local anesthetic (numbing medicine) is injected in or near the joint space. Steroids are often used to help with the anti-inflammatory process.     Activity  -You may resume most normal activity levels with the exception of strenuous activity. It is important for us to know if your pain with normal activity is relieved after this injection.  -DO NOT shower for 24 hours  -DO NOT remove bandaid for 24 hours    Pain  -You may experience soreness at the injection site for one or two days  -You may use an ice pack for 20 minutes every 2 hours for the first 24 hours  -You may use a heating pad after the first 24 hours  -You may use Tylenol (acetaminophen) every 4 hours or other pain medicines as     directed by your physician    You may experience numbness radiating into your legs. This numbness may last several hours. Until sensation returns to normal; please use caution in walking, climbing stairs, and stepping out of your vehicle, etc.    DID YOU RECEIVE SEDATION TODAY?  No    DID YOU RECEIVE STEROIDS TODAY?  Yes    Common side effects of steroids:  Not everyone will experience corticosteroid side effects. If side effects are experienced, they will gradually subside in the 7-10 day period following an injection. Most common side effects include:  -Flushed face and/or chest  -Feeling of warmth, particularly in the face but could be an overall feeling of warmth  -Increased blood sugar in diabetic patients  -Menstrual irregularities my occur. If taking hormone-based birth control an alternate method of birth control is recommended  -Sleep disturbances and/or mood swings are  possible  -Leg cramps      PLEASE KEEP TRACK OF YOUR SYMPTOMS AND NOTE YOUR IMPROVEMENT FOR YOUR DOCTOR.     Please contact us if you have:  -Severe pain  -Fever more than 101.5 degrees Fahrenheit  -Signs of infection at the injection site (redness, swelling, or drainage)    If you have questions, please contact our office at [phone] between the hours of 7:00 am and 3:00 pm Monday through Friday. After office hours you can contact the on call provider by dialing [phone]. If you need immediate attention, we recommend that you go to a hospital emergency room or dial [phone].

## 2017-11-14 NOTE — IP AVS SNAPSHOT
[facility]    [address] [place] [address]    Phone:  [phone]    Fax:  [phone]                                       After Visit Summary   11/14/2017    Rosemarie Zabala    MRN: [id number]           After Visit Summary Signature Page     I have received my discharge instructions, and my questions have been answered. I have discussed any challenges I see with this plan with the nurse or doctor.    ..........................................................................................................................................  Patient/Patient Representative Signature      ..........................................................................................................................................  Patient Representative Print Name and Relationship to Patient    ..................................................               ................................................  Date                                            Time    ..........................................................................................................................................  Reviewed by Signature/Title    ...................................................              ..............................................  Date                                                            Time

## 2018-01-02 ENCOUNTER — DOCUMENTATION ONLY (OUTPATIENT)
Dept: ANESTHESIOLOGY | Facility: CLINIC | Age: 58
End: 2018-01-02

## 2018-01-24 ASSESSMENT — ANXIETY QUESTIONNAIRES
1. FEELING NERVOUS, ANXIOUS, OR ON EDGE: NOT AT ALL
4. TROUBLE RELAXING: SEVERAL DAYS
GAD7 TOTAL SCORE: 2
5. BEING SO RESTLESS THAT IT IS HARD TO SIT STILL: NOT AT ALL
7. FEELING AFRAID AS IF SOMETHING AWFUL MIGHT HAPPEN: NOT AT ALL
7. FEELING AFRAID AS IF SOMETHING AWFUL MIGHT HAPPEN: NOT AT ALL
6. BECOMING EASILY ANNOYED OR IRRITABLE: SEVERAL DAYS
3. WORRYING TOO MUCH ABOUT DIFFERENT THINGS: NOT AT ALL
GAD7 TOTAL SCORE: 2
2. NOT BEING ABLE TO STOP OR CONTROL WORRYING: NOT AT ALL

## 2018-01-25 ASSESSMENT — ANXIETY QUESTIONNAIRES: GAD7 TOTAL SCORE: 2

## 2018-01-29 ENCOUNTER — OFFICE VISIT (OUTPATIENT)
Dept: ANESTHESIOLOGY | Facility: CLINIC | Age: 58
End: 2018-01-29
Payer: COMMERCIAL

## 2018-01-29 ENCOUNTER — TELEPHONE (OUTPATIENT)
Dept: ANESTHESIOLOGY | Facility: CLINIC | Age: 58
End: 2018-01-29

## 2018-01-29 VITALS
WEIGHT: 195 LBS | RESPIRATION RATE: 16 BRPM | BODY MASS INDEX: 30.61 KG/M2 | HEIGHT: 67 IN | SYSTOLIC BLOOD PRESSURE: 135 MMHG | HEART RATE: 73 BPM | DIASTOLIC BLOOD PRESSURE: 84 MMHG

## 2018-01-29 DIAGNOSIS — M53.3 SI (SACROILIAC) JOINT DYSFUNCTION: Primary | ICD-10-CM

## 2018-01-29 ASSESSMENT — PAIN SCALES - GENERAL: PAINLEVEL: MODERATE PAIN (5)

## 2018-01-29 NOTE — NURSING NOTE
.LPN reviewed AVS with Pt includin. Schedule your procedure with Dr. Richardson    Follow up: 2 weeks in clinic after your procedure.     Please call Jyoti at [phone] to schedule your procedure. She is available Monday - Friday from 9-5:30pm, if she is unavailable to take your call, please leave her a voice message with your name, birth date, and phone number and she will call you back.    Your procedure: Bilateral SI Joint injections.     Pt verbalized an understanding of information, and was asked to contact clinic with questions.    LPN read through the Pre-procedure instructions with pt.   Pt verbalized understanding to holding appropriate medication per protocol, and was agreeable to NPO policy and needing a .      Sonali Vega LPN

## 2018-01-29 NOTE — MR AVS SNAPSHOT
After Visit Summary   1/29/2018    Rosemarie Zabala    MRN: [id number]           Patient Information     Date Of Birth          1960        Visit Information        Provider Department      1/29/2018 10:40 AM Christen Richardson MD [facility]        Today's Diagnoses     SI (sacroiliac) joint dysfunction    -  1      Care Instructions    1. Schedule your procedure with Dr. Richardson        Follow up: 2 weeks in clinic after your procedure.     Please call Jyoti at [phone] to schedule your procedure. She is available Monday - Friday from 9-5:30pm, if she is unavailable to take your call, please leave her a voice message with your name, birth date, and phone number and she will call you back.    Your procedure: Bilateral SI Joint injections.     On the day of the procedure  1. Arrive 1 hour earlier than your scheduled time, to the [facility]  Address: [address] [place] [address]  2. Check in on the 5th floor for your procedure     You will need to follow up in clinic in 2 weeks after your procedure.    If you must reschedule your procedure more than two times, you must follow up in clinic before rescheduling again.      Patient Pre-Procedure Instructions    CAUTION - FAILURE TO FOLLOW THESE PRE-PROCEDURE INSTRUCTIONS WILL RESULT IN YOUR PROCEDURE BEING RESCHEDULED.      Pregnancy  If you are pregnant, or think that you may be pregnant, please notify our staff. This may or may not affect the ability to perform the procedure.     You MUST have a  TO TAKE YOU HOME after your procedure. Transportation by Taxi or Para-transit must have a responsible adult accompany you home (other than the ). Travel by bus or light rail is not acceptable transportation. You must provide your 's full name and contact number at time of check in.   Fasting Protocol You may have NOTHING SOLID TO EAT FOR 8 HOURS  prior to arrival at the procedure area.   Broth and candy are considered solid food and require an eight hour fast.   You may have CLEAR LIQUIDS UP TO 2 HOURS prior to arrival at the clinic.   Clear liquids include water, clear fruit juice (no pulp) carbonated beverages, ice, black coffee, black tea (no milk or cream), chewing gum (un-swallowed), and/or clear jello (no fruit or milk). No alcohol containing beverages.   Medications If you take any medications,  DO NOT STOP. Take your medications as usual the morning of your procedure with a sip of water AT LEAST 2 HOURS PRIOR TO ARRIVAL.    Antibiotics If you are currently taking antibiotics, you must complete the entire dose 7 days prior to your scheduled procedure. You must be clear of any signs or symptoms of infection. If you begin antibiotics, please contact our clinic for instructions.   Fever, Chills, or Rash If you experience a fever of higher than 100 degrees, chills, rash, or open wounds during the one week prior to your procedure, please call the clinic.         Medication Hold List  **Patients under Cardiology/Neurology care should consult their provider prior to the pain procedure to verify pre-procedure medication instructions. The information below contains general guidelines.**    Blood Thinners If you are taking daily ASPIRIN, PLAVIX, OR OTHER BLOOD THINNERS SUCH AS COUMADIN/WARFARIN, we will need your prescribing doctor to sign a release permitting you to stop these medications. Once approved by your prescribing doctor - STOP ALL BLOOD THINNERS BASED ON THE TIME TABLE BELOW PRIOR TO YOUR PROCEDURE. If you have been instructed to stop WARFARIN(COUMADIN), you must have an INR lab drawn the day before your procedure. . Your INR must be within normal limits before we can perform your injection. MEDICATIONS CAN BE RESTARTED AFTER YOUR PROCEDURE.    14 DAY HOLD  Ticlid (ticlopidine)    10 DAY HOLD  Effient (Prasugel)    3 DAY HOLD  Xarelto (rivaroxaban) 7  DAY HOLD  Anacin, Bufferin, Ecotrin, Excedrin, Aggrenox (Aspirin)  Brilinta (ticagrelor)  Coumadin (Warfarin)  Pradexa (Dabigatran)  Elmiron (Pentosan)  Plavix (Clopidogrel Bisulfate)  Pletal (Cilostazol)    24 HOUR HOLD  Lovenox (enoxaparin)  Agrylin (Anagrelide)        Non-steroidal Anti-inflammatories (NSAIDs) DO NOT TAKE any non-steroidal anti-inflammatory medications (NSAIDs) listed on the table below. MEDICATIONS CAN BE RESTARTED AFTER YOUR PROCEDURE. Celebrex is OK to take and does not need to be discontinued.     Medications to stop:  3 DAY HOLD  Advil, Motrin (Ibuprofen)  Arthrotec (diciofenac sodium/misoprostol)  Clinoril (Sulindac)  Indocin (Indomethacin)  Lodine (Etodolac)  Toradol (Ketorolac)  Vicoprofen (Hydrocodone and Ibuprofen)  Voltaren (Diclotenac)    14 DAY HOLD  Daypro (Oxaprozin)  Feldene (Piroxicam)   7 DAY HOLD  Aleve (Naproxen sodium)  Darvon compound (contains aspirin)  Naprosyn (Naproxen)  Norgesic Forte (contains aspirin)  Mobic (Meloxicam)  Oruvall (Ketoprofen)  Percodan (contains aspirin)  Relafen (Nabumetone)  Salsalate  Trilisate  Vitamin E (more than 400 mg per day)  Any medication containing aspirin                To speak with a nurse, schedule/reschedule/cancel a clinic appointment, or request a medication refill call: [phone]     You can also reach us by [employer]: [email or web address]    For refills, please call on Monday, 1 week before your medication runs out. The doctors are not always in clinic, so this gives us time to get your prescriptions ready.  Please let us know the name of the medication you are requesting a refill of.                                     Follow-ups after your visit        Additional Services     PHYSICAL THERAPY REFERRAL       *This therapy referral will be filtered to a centralized scheduling office at [facility] and the patient will receive a call to schedule an appointment at a [place] location most  "convenient for them. *     [facility] provides Physical Therapy evaluation and treatment and many specialty services across the [facility].  If requesting a specialty program, please choose from the list below.    If you have not heard from the scheduling office within 2 business days, please call [phone] for all locations, with the exception of [place], please call [phone].  Treatment: Evaluation & Treatment  Special Instructions/Modalities: pain management, SI joint dysfunction  Special Programs: None    Please be aware that coverage of these services is subject to the terms and limitations of your health insurance plan.  Call member services at your health plan with any benefit or coverage questions.      **Note to Provider:  If you are referring outside of [facility] for the therapy appointment, please list the name of the location in the \"special instructions\" above, print the referral and give to the patient to schedule the appointment.                  Who to contact     Please call your clinic at [phone] to:    Ask questions about your health    Make or cancel appointments    Discuss your medicines    Learn about your test results    Speak to your doctor   If you have compliments or concerns about an experience at your clinic, or if you wish to file a complaint, please contact [facility] Patient Relations at [phone] or email us at [email or web address]         Additional Information About Your Visit        [employer] Information     [employer] gives you secure access to your electronic health record. If you see a primary care provider, you can also send messages to your care team and make appointments. If you have questions, please call your primary care clinic.  If you do not have a primary care provider, please call [phone] and they will assist you.      [employer] is an electronic gateway that provides easy, online access to your " "medical records. With [employer], you can request a clinic appointment, read your test results, renew a prescription or communicate with your care team.     To access your existing account, please contact your [facility] or call [phone] for assistance.        Care EveryWhere ID     This is your Care EveryWhere ID. This could be used by other organizations to access your [place] medical records  [id number]        Your Vitals Were     Pulse Respirations Height Last Period BMI (Body Mass Index)       73 16 1.702 m (5' 7\") 12/14/2012 30.54 kg/m2        Blood Pressure from Last 3 Encounters:   01/29/18 135/84   11/14/17 (P) 147/87   09/20/17 131/75    Weight from Last 3 Encounters:   01/29/18 88.5 kg (195 lb)   11/14/17 87.5 kg (193 lb)   09/20/17 87.5 kg (193 lb)              We Performed the Following     Fariba-Operative Worksheet - SI Joint Injection     PHYSICAL THERAPY REFERRAL        Primary Care Provider Office Phone # Fax #    Shoshana Bhatti PA-C [phone]       [place] [address] [facility] [address]        Equal Access to Services     ELIZABETH CHRISTIAN : Hadii aad ku hadasho Soceliaali, waaxda luqadaha, qaybta kaalmada adeegyada, fariha de paz. So [facility] [phone].    ATENCIÓN: Si habla español, tiene a garcia disposición servicios gratuitos de asistencia lingüística. Llame al [phone].    We comply with applicable federal civil rights laws and [place] laws. We do not discriminate on the basis of race, color, national origin, age, disability, sex, sexual orientation, or gender identity.            Thank you!     Thank you for choosing [facility]  for your care. Our goal is always to provide you with excellent care. Hearing back from our patients is one way we can continue to improve our services. Please take a few minutes to complete the written survey that you may receive in " the mail after your visit with us. Thank you!             Your Updated Medication List - Protect others around you: Learn how to safely use, store and throw away your medicines at www.disposemymeds.org.          This list is accurate as of 1/29/18 11:11 AM.  Always use your most recent med list.                   Brand Name Dispense Instructions for use Diagnosis    atenolol 25 MG tablet    TENORMIN    90 tablet    Take 1 tablet (25 mg) by mouth daily    Benign essential hypertension       atorvastatin 20 MG tablet    LIPITOR    90 tablet    TAKE ONE TABLET BY MOUTH ONE TIME DAILY    Hyperlipidemia LDL goal <130       calcium carbonate 1250 MG tablet    OS-VALENTIN 500 mg [place]. [place]     Take 1,000 mg by mouth daily        clonazePAM 0.25 mg Tabs half-tab    klonoPIN          HYDROcodone-acetaminophen 5-325 MG    NORCO          IBUPROFEN PO      Take 600 mg by mouth        LEXAPRO PO      Take 30 mg by mouth daily        omega-3 fatty acids 1200 MG capsule      Take 1 capsule by mouth 3 times daily.        VITAMIN C PO      Take by mouth daily        vitamin D 1000 UNITS capsule      Take 1 capsule by mouth daily.

## 2018-01-29 NOTE — LETTER
1/29/2018       RE: Rosemarie Zabala  [address] [place] [address]     Dear Colleague,    Thank you for referring your patient, Rosemarie Zabala, to the [facility] Please see a copy of my visit note below.    Rosemarie Zabala is a 57 year old female who complains of low back pain for several years duration and has been attempting to get a lumbosacral fusion done by Dr. Hemphill, but this has been delayed by insurance difficulty and has been coming to our clinic for temporizing injections.  She had bilateral SI joint injections by me on 11/14/17 with almost 100% pain relief for 5 weeks until she was lifting her father and at that moment felt her pain come back.  She is interested in repeating the injections.  She is not currently doing PT but does feel she got some benefit when doing PT sessions.      The pain is denoted as 7/10 in severity.  The pain is described as shooting pain, parasthesias and numbness.  The pain is alleviated by nothing.  The pain is exacerbated by general activity, and especially sitting for prolonged periods on her sacrum.  Several modalities have been utilized to diminish the pain in the past.  These include SI joint injection (diagnostic LA injection by Dr. Hemphill which did help), then therapeutic SI bilateral injections 11/14/17 by me, facet injections (helped for about 6 months at a time then began to diminish) and LESI which also helped.  The patient Denies any bowel or bladder incontinence.  The patient Denies any subjective weakness.    Current Medications:  Current Outpatient Prescriptions   Medication     IBUPROFEN PO     HYDROcodone-acetaminophen (NORCO) 5-325 MG     clonazePAM (KLONOPIN) 0.25 mg TABS half-tab     Escitalopram Oxalate (LEXAPRO PO)     atorvastatin (LIPITOR) 20 MG tablet     Ascorbic Acid (VITAMIN C PO)     calcium carbonate (OS-VALENTIN 500 MG [place]. [place]) 500 MG tablet      atenolol (TENORMIN) 25 MG tablet     Cholecalciferol (VITAMIN D) 1000 UNITS capsule     Omega-3 Fatty Acids (OMEGA 3) 1200 MG capsule     No current facility-administered medications for this visit.        Allergies:  Allergies   Allergen Reactions     No Known Allergies        Past Medical History:  Past Medical History:   Diagnosis Date     Calculus of kidney 1994     Essential hypertension, benign     resolved 2014     Generalised anxiety disorder 2011     Genital herpes 2004     Left knee pain 2005    left knee cortisone injection     Mixed hyperlipidemia      Other and unspecified malignant neoplasm of skin of other and unspecified parts of face     left cheek, treated with Aldara cream       Social History:  Social History   Substance Use Topics     Smoking status: Never Smoker     Smokeless tobacco: Never Used     Alcohol use Yes      Comment: 3 drinks a week         Review of Systems:    Constitutional:  NEGATIVE for fever, chills, change in weight  INTEGUMENTARY/SKIN:  NEGATIVE for worrisome rashes, moles or lesions  EYES:  NEGATIVE for vision changes or irritation  ENT/MOUTH:  NEGATIVE for ear, mouth and throat problems  RESP:  NEGATIVE for significant cough or SOB  CV:  NEGATIVE for chest pain, palpitations or peripheral edema  GI:  NEGATIVE for nausea, abdominal pain, heartburn, or change in bowel habits  MUSCULOSKELATAL:  NEGATIVE for significant arthralgias or myalgia  NEURO:  NEGATIVE for weakness, dizziness or paresthesias  ENDOCRINE:  NEGATIVE for temperature intolerance, skin/hair changes  HEME/ALLERGY/IMMUNE:  NEGATIVE for bleeding problems  PSYCHIATRIC:  NEGATIVE for changes in mood or affect    Physical Examination    General Appearance:   Presentation:   Pleasant and Cooperative; looks stated age, no acute distress  Historian:  Good    General Exam:  HEENT:   Normocephalic, atraumatic, sclera, conjunctiva and pharynx normal  Neck:   Supple without adenopathy  Chest:  Clear to  auscultation  Heart:  RRR  Abdomen:   Soft, non-tender with good bowel sounds, no masses felt  Extremeties:   No edema, cyanosis or clubbing, good pulses in all 4 limbs  Skin:  No lesions, warm and dry    Musculoskeletal Exam:    POSTURE:  WNL and Erect  LS SPINE:  Palpation:    Firm, ROM:  Limited, Stabilization:   Correction- full and SLR  PELVIS/HIP GIRDLE:  Up Shift:  right  UPPER EXTREMITY:    Joints:  No Swelling  LOWER EXTREMITY:  Joints:  No Swelling    Neurologic Exam:   MS: Alert and oriented X3, attention, memory and concentration intact, language and speech normal with appropriate fund of knowledge    Psychiatric:   Manner:  Pleasant and appropriate      Radiographs:  MRI lumbar spine 3/2017 from [facility] in [facility]: shows some levels of severe facet arthropathy and foraminal narrowing.           A/P:  Sacroiliac joint arthropathy   Lumbar radiculopathy and facet arthropathy hoping to have fusion in future now hoping to have temporizing injections.    I will schedule her for repeat bilateral SI joint injections at the next available appointment.  I will also make a referral for another 6 weeks of PT (3 x per week ideally).  She should follow up with me 2 weeks after the procedure.  If this is not satisfactory, we can focus on lumbar facet joints as well.     Again, thank you for allowing me to participate in the care of your patient.      Sincerely,    Christen Richardson MD

## 2018-01-29 NOTE — PATIENT INSTRUCTIONS
1. Schedule your procedure with Dr. Richardson        Follow up: 2 weeks in clinic after your procedure.     Please call Jyoti at [phone] to schedule your procedure. She is available Monday - Friday from 9-5:30pm, if she is unavailable to take your call, please leave her a voice message with your name, birth date, and phone number and she will call you back.    Your procedure: Bilateral SI Joint injections.     On the day of the procedure  1. Arrive 1 hour earlier than your scheduled time, to the [facility]  Address: [address] [place] [address]  2. Check in on the 5th floor for your procedure     You will need to follow up in clinic in 2 weeks after your procedure.    If you must reschedule your procedure more than two times, you must follow up in clinic before rescheduling again.      Patient Pre-Procedure Instructions    CAUTION - FAILURE TO FOLLOW THESE PRE-PROCEDURE INSTRUCTIONS WILL RESULT IN YOUR PROCEDURE BEING RESCHEDULED.      Pregnancy  If you are pregnant, or think that you may be pregnant, please notify our staff. This may or may not affect the ability to perform the procedure.     You MUST have a  TO TAKE YOU HOME after your procedure. Transportation by Taxi or Para-transit must have a responsible adult accompany you home (other than the ). Travel by bus or light rail is not acceptable transportation. You must provide your 's full name and contact number at time of check in.   Fasting Protocol You may have NOTHING SOLID TO EAT FOR 8 HOURS prior to arrival at the procedure area.   Broth and candy are considered solid food and require an eight hour fast.   You may have CLEAR LIQUIDS UP TO 2 HOURS prior to arrival at the clinic.   Clear liquids include water, clear fruit juice (no pulp) carbonated beverages, ice, black coffee, black tea (no milk or cream), chewing gum (un-swallowed), and/or clear jello (no fruit or milk). No alcohol  containing beverages.   Medications If you take any medications,  DO NOT STOP. Take your medications as usual the morning of your procedure with a sip of water AT LEAST 2 HOURS PRIOR TO ARRIVAL.    Antibiotics If you are currently taking antibiotics, you must complete the entire dose 7 days prior to your scheduled procedure. You must be clear of any signs or symptoms of infection. If you begin antibiotics, please contact our clinic for instructions.   Fever, Chills, or Rash If you experience a fever of higher than 100 degrees, chills, rash, or open wounds during the one week prior to your procedure, please call the clinic.         Medication Hold List  **Patients under Cardiology/Neurology care should consult their provider prior to the pain procedure to verify pre-procedure medication instructions. The information below contains general guidelines.**    Blood Thinners If you are taking daily ASPIRIN, PLAVIX, OR OTHER BLOOD THINNERS SUCH AS COUMADIN/WARFARIN, we will need your prescribing doctor to sign a release permitting you to stop these medications. Once approved by your prescribing doctor - STOP ALL BLOOD THINNERS BASED ON THE TIME TABLE BELOW PRIOR TO YOUR PROCEDURE. If you have been instructed to stop WARFARIN(COUMADIN), you must have an INR lab drawn the day before your procedure. . Your INR must be within normal limits before we can perform your injection. MEDICATIONS CAN BE RESTARTED AFTER YOUR PROCEDURE.    14 DAY HOLD  Ticlid (ticlopidine)    10 DAY HOLD  Effient (Prasugel)    3 DAY HOLD  Xarelto (rivaroxaban) 7 DAY HOLD  Anacin, Bufferin, Ecotrin, Excedrin, Aggrenox (Aspirin)  Brilinta (ticagrelor)  Coumadin (Warfarin)  Pradexa (Dabigatran)  Elmiron (Pentosan)  Plavix (Clopidogrel Bisulfate)  Pletal (Cilostazol)    24 HOUR HOLD  Lovenox (enoxaparin)  Agrylin (Anagrelide)        Non-steroidal Anti-inflammatories (NSAIDs) DO NOT TAKE any non-steroidal anti-inflammatory medications (NSAIDs) listed on the  table below. MEDICATIONS CAN BE RESTARTED AFTER YOUR PROCEDURE. Celebrex is OK to take and does not need to be discontinued.     Medications to stop:  3 DAY HOLD  Advil, Motrin (Ibuprofen)  Arthrotec (diciofenac sodium/misoprostol)  Clinoril (Sulindac)  Indocin (Indomethacin)  Lodine (Etodolac)  Toradol (Ketorolac)  Vicoprofen (Hydrocodone and Ibuprofen)  Voltaren (Diclotenac)    14 DAY HOLD  Daypro (Oxaprozin)  Feldene (Piroxicam)   7 DAY HOLD  Aleve (Naproxen sodium)  Darvon compound (contains aspirin)  Naprosyn (Naproxen)  Norgesic Forte (contains aspirin)  Mobic (Meloxicam)  Oruvall (Ketoprofen)  Percodan (contains aspirin)  Relafen (Nabumetone)  Salsalate  Trilisate  Vitamin E (more than 400 mg per day)  Any medication containing aspirin                To speak with a nurse, schedule/reschedule/cancel a clinic appointment, or request a medication refill call: [phone]     You can also reach us by [employer]: [email or web address]    For refills, please call on Monday, 1 week before your medication runs out. The doctors are not always in clinic, so this gives us time to get your prescriptions ready.  Please let us know the name of the medication you are requesting a refill of.

## 2018-01-29 NOTE — PROGRESS NOTES
Rosemarie Zabala is a 57 year old female who complains of low back pain for several years duration and has been attempting to get a lumbosacral fusion done by Dr. Hemphill, but this has been delayed by insurance difficulty and has been coming to our clinic for temporizing injections.  She had bilateral SI joint injections by me on 11/14/17 with almost 100% pain relief for 5 weeks until she was lifting her father and at that moment felt her pain come back.  She is interested in repeating the injections.  She is not currently doing PT but does feel she got some benefit when doing PT sessions.      The pain is denoted as 7/10 in severity.  The pain is described as shooting pain, parasthesias and numbness.  The pain is alleviated by nothing.  The pain is exacerbated by general activity, and especially sitting for prolonged periods on her sacrum.  Several modalities have been utilized to diminish the pain in the past.  These include SI joint injection (diagnostic LA injection by Dr. Hemphill which did help), then therapeutic SI bilateral injections 11/14/17 by me, facet injections (helped for about 6 months at a time then began to diminish) and LESI which also helped.  The patient Denies any bowel or bladder incontinence.  The patient Denies any subjective weakness.    Current Medications:  Current Outpatient Prescriptions   Medication     IBUPROFEN PO     HYDROcodone-acetaminophen (NORCO) 5-325 MG     clonazePAM (KLONOPIN) 0.25 mg TABS half-tab     Escitalopram Oxalate (LEXAPRO PO)     atorvastatin (LIPITOR) 20 MG tablet     Ascorbic Acid (VITAMIN C PO)     calcium carbonate (OS-VALENTIN 500 MG [place]. [place]) 500 MG tablet     atenolol (TENORMIN) 25 MG tablet     Cholecalciferol (VITAMIN D) 1000 UNITS capsule     Omega-3 Fatty Acids (OMEGA 3) 1200 MG capsule     No current facility-administered medications for this visit.        Allergies:  Allergies   Allergen Reactions     No Known Allergies        Past Medical History:  Past  Medical History:   Diagnosis Date     Calculus of kidney 1994     Essential hypertension, benign     resolved 2014     Generalised anxiety disorder 2011     Genital herpes 2004     Left knee pain 2005    left knee cortisone injection     Mixed hyperlipidemia      Other and unspecified malignant neoplasm of skin of other and unspecified parts of face     left cheek, treated with Aldara cream       Social History:  Social History   Substance Use Topics     Smoking status: Never Smoker     Smokeless tobacco: Never Used     Alcohol use Yes      Comment: 3 drinks a week         Review of Systems:    Constitutional:  NEGATIVE for fever, chills, change in weight  INTEGUMENTARY/SKIN:  NEGATIVE for worrisome rashes, moles or lesions  EYES:  NEGATIVE for vision changes or irritation  ENT/MOUTH:  NEGATIVE for ear, mouth and throat problems  RESP:  NEGATIVE for significant cough or SOB  CV:  NEGATIVE for chest pain, palpitations or peripheral edema  GI:  NEGATIVE for nausea, abdominal pain, heartburn, or change in bowel habits  MUSCULOSKELATAL:  NEGATIVE for significant arthralgias or myalgia  NEURO:  NEGATIVE for weakness, dizziness or paresthesias  ENDOCRINE:  NEGATIVE for temperature intolerance, skin/hair changes  HEME/ALLERGY/IMMUNE:  NEGATIVE for bleeding problems  PSYCHIATRIC:  NEGATIVE for changes in mood or affect    Physical Examination    General Appearance:   Presentation:   Pleasant and Cooperative; looks stated age, no acute distress  Historian:  Good    General Exam:  HEENT:   Normocephalic, atraumatic, sclera, conjunctiva and pharynx normal  Neck:   Supple without adenopathy  Chest:  Clear to auscultation  Heart:  RRR  Abdomen:   Soft, non-tender with good bowel sounds, no masses felt  Extremeties:   No edema, cyanosis or clubbing, good pulses in all 4 limbs  Skin:  No lesions, warm and dry    Musculoskeletal Exam:    POSTURE:  WNL and Erect  LS SPINE:  Palpation:    Firm, ROM:  Limited, Stabilization:    Correction- full and SLR  PELVIS/HIP GIRDLE:  Up Shift:  right  UPPER EXTREMITY:    Joints:  No Swelling  LOWER EXTREMITY:  Joints:  No Swelling    Neurologic Exam:   MS: Alert and oriented X3, attention, memory and concentration intact, language and speech normal with appropriate fund of knowledge    Psychiatric:   Manner:  Pleasant and appropriate      Radiographs:  MRI lumbar spine 3/2017 from [place] in [facility]: shows some levels of severe facet arthropathy and foraminal narrowing.           A/P:  Sacroiliac joint arthropathy   Lumbar radiculopathy and facet arthropathy hoping to have fusion in future now hoping to have temporizing injections.    I will schedule her for repeat bilateral SI joint injections at the next available appointment.  I will also make a referral for another 6 weeks of PT (3 x per week ideally).  She should follow up with me 2 weeks after the procedure.  If this is not satisfactory, we can focus on lumbar facet joints as well.         Christen Richardson MD          Answers for HPI/ROS submitted by the patient on 1/24/2018   RADHA 7 TOTAL SCORE: 2

## 2018-01-29 NOTE — TELEPHONE ENCOUNTER
Patient called to schedule injection.  Dr Richardson has nothing until March and patient can't wait that long so I scheduled with Godwin on 2/21/18

## 2018-02-21 ENCOUNTER — SURGERY (OUTPATIENT)
Age: 58
End: 2018-02-21

## 2018-02-21 ENCOUNTER — RADIANT APPOINTMENT (OUTPATIENT)
Dept: RADIOLOGY | Facility: AMBULATORY SURGERY CENTER | Age: 58
End: 2018-02-21
Attending: ANESTHESIOLOGY
Payer: COMMERCIAL

## 2018-02-21 ENCOUNTER — HOSPITAL ENCOUNTER (OUTPATIENT)
Facility: AMBULATORY SURGERY CENTER | Age: 58
End: 2018-02-21
Attending: ANESTHESIOLOGY
Payer: COMMERCIAL

## 2018-02-21 VITALS
RESPIRATION RATE: 15 BRPM | SYSTOLIC BLOOD PRESSURE: 138 MMHG | OXYGEN SATURATION: 98 % | HEIGHT: 67 IN | TEMPERATURE: 97.4 F | WEIGHT: 195 LBS | DIASTOLIC BLOOD PRESSURE: 82 MMHG | BODY MASS INDEX: 30.61 KG/M2

## 2018-02-21 DIAGNOSIS — R52 PAIN: ICD-10-CM

## 2018-02-21 RX ORDER — LIDOCAINE HYDROCHLORIDE 10 MG/ML
INJECTION, SOLUTION EPIDURAL; INFILTRATION; INTRACAUDAL; PERINEURAL PRN
Status: DISCONTINUED | OUTPATIENT
Start: 2018-02-21 | End: 2018-02-21 | Stop reason: HOSPADM

## 2018-02-21 RX ORDER — BUPIVACAINE HYDROCHLORIDE 5 MG/ML
INJECTION, SOLUTION EPIDURAL; INTRACAUDAL PRN
Status: DISCONTINUED | OUTPATIENT
Start: 2018-02-21 | End: 2018-02-21 | Stop reason: HOSPADM

## 2018-02-21 RX ORDER — TRIAMCINOLONE ACETONIDE 40 MG/ML
INJECTION, SUSPENSION INTRA-ARTICULAR; INTRAMUSCULAR PRN
Status: DISCONTINUED | OUTPATIENT
Start: 2018-02-21 | End: 2018-02-21 | Stop reason: HOSPADM

## 2018-02-21 RX ADMIN — TRIAMCINOLONE ACETONIDE 80 MG: 40 INJECTION, SUSPENSION INTRA-ARTICULAR; INTRAMUSCULAR at 12:20

## 2018-02-21 RX ADMIN — BUPIVACAINE HYDROCHLORIDE 8 ML: 5 INJECTION, SOLUTION EPIDURAL; INTRACAUDAL at 12:20

## 2018-02-21 RX ADMIN — LIDOCAINE HYDROCHLORIDE 7 ML: 10 INJECTION, SOLUTION EPIDURAL; INFILTRATION; INTRACAUDAL; PERINEURAL at 12:20

## 2018-02-21 NOTE — IP AVS SNAPSHOT
[facility]    [address] [place] [id number]    Phone:  [phone]    Fax:  [phone]                                       After Visit Summary   2/21/2018    Rosemarie Zabala    MRN: [id number]           After Visit Summary Signature Page     I have received my discharge instructions, and my questions have been answered. I have discussed any challenges I see with this plan with the nurse or doctor.    ..........................................................................................................................................  Patient/Patient Representative Signature      ..........................................................................................................................................  Patient Representative Print Name and Relationship to Patient    ..................................................               ................................................  Date                                            Time    ..........................................................................................................................................  Reviewed by Signature/Title    ...................................................              ..............................................  Date                                                            Time

## 2018-02-21 NOTE — IP AVS SNAPSHOT
MRN:[id number]                      After Visit Summary   2/21/2018    Rosemarie Zabala    MRN: [id number]           Thank you!     Thank you for choosing [facility] for your care. Our goal is always to provide you with excellent care. Hearing back from our patients is one way we can continue to improve our services. Please take a few minutes to complete the written survey that you may receive in the mail after you visit with us. Thank you!        Patient Information     Date Of Birth          1960        About your hospital stay     You were admitted on:  February 21, 2018 [facility]    You were discharged on:  February 21, 2018       Who to Call     For medical emergencies, please call 911.  For non-urgent questions about your medical care, please call your primary care provider or clinic, [phone]  For questions related to your surgery, please call your surgery clinic        Attending Provider     Provider Berny Patino MD Anesthesiology       Primary Care Provider Office Phone # Fax #    Shoshana Bhatti PA-C [phone]      Further instructions from your care team       Home Care Instructions after a Sacroiliac Joint Injection    The sacroiliac joints lie next to the spine and connect the sacrum(the bottom of the spine) with the hip on both sides. There are two sacroiliac joints, one on the right and one on the left. Joint inflammation and/or dysfunction in this area can cause pain. In a sacroiliac joint injection, a local anesthetic (numbing medicine) is injected in or near the joint space. Steroids are often used to help with the anti-inflammatory process.     Activity  -You may resume most normal activity levels with the exception of strenuous activity. It is important for us to know if your pain with normal activity is relieved after this injection.  -DO NOT shower for 24  hours  -DO NOT remove bandaid for 24 hours    Pain  -You may experience soreness at the injection site for one or two days  -You may use an ice pack for 20 minutes every 2 hours for the first 24 hours  -You may use a heating pad after the first 24 hours  -You may use Tylenol (acetaminophen) every 4 hours or other pain medicines as     directed by your physician    You may experience numbness radiating into your legs. This numbness may last several hours. Until sensation returns to normal; please use caution in walking, climbing stairs, and stepping out of your vehicle, etc.    DID YOU RECEIVE SEDATION TODAY?  No      DID YOU RECEIVE STEROIDS TODAY?  Yes    Common side effects of steroids:  Not everyone will experience corticosteroid side effects. If side effects are experienced, they will gradually subside in the 7-10 day period following an injection. Most common side effects include:  -Flushed face and/or chest  -Feeling of warmth, particularly in the face but could be an overall feeling of warmth  -Increased blood sugar in diabetic patients  -Menstrual irregularities my occur. If taking hormone-based birth control an alternate method of birth control is recommended  -Sleep disturbances and/or mood swings are possible  -Leg cramps      PLEASE KEEP TRACK OF YOUR SYMPTOMS AND NOTE YOUR IMPROVEMENT FOR YOUR DOCTOR.     Please contact us if you have:  -Severe pain  -Fever more than 101.5 degrees Fahrenheit  -Signs of infection at the injection site (redness, swelling, or drainage)    If you have questions, please contact our office at [phone] between the hours of 7:00 am and 3:00 pm Monday through Friday. After office hours you can contact the on call provider by dialing [phone]. If you need immediate attention, we recommend that you go to a hospital emergency room or dial [phone].      Pending Results     Date and Time Order Name Status Description    2/21/2018 1147 XR SURGERY DARREN FLUORO LESS THAN 5 MIN W  "STILLS In process             Admission Information     Date & Time Provider Department Dept. Phone    2/21/2018 Berny Connelly MD [facility] [phone]      Your Vitals Were     Blood Pressure Temperature Respirations Height Weight Last Period    147/80 97.4  F (36.3  C) (Temporal) 16 1.702 m (5' 7\") 88.5 kg (195 lb) 12/14/2012    Pulse Oximetry BMI (Body Mass Index)                98% 30.54 kg/m2          MyChart Information     [employer] gives you secure access to your electronic health record. If you see a primary care provider, you can also send messages to your care team and make appointments. If you have questions, please call your primary care clinic.  If you do not have a primary care provider, please call [phone] and they will assist you.      [employer] is an electronic gateway that provides easy, online access to your medical records. With [employer], you can request a clinic appointment, read your test results, renew a prescription or communicate with your care team.     To access your existing account, please contact your [facility] or call [phone] for assistance.        Care EveryWhere ID     This is your Care EveryWhere ID. This could be used by other organizations to access your [facility] medical records  [id number]        Equal Access to Services     ELIZABETH CHRISTIAN : Lisa bartholomewo Soisabel, waaxda luqadaha, qaybta kaalmada adeegyada, fariha de paz. So [facility] [phone].    ATENCIÓN: Si habla español, tiene a garcia disposición servicios gratuitos de asistencia lingüística. Llolu al [phone].    We comply with applicable federal civil rights laws and [place] laws. We do not discriminate on the basis of race, color, national origin, age, disability, sex, sexual orientation, or gender identity.               Review of your medicines      UNREVIEWED medicines. Ask your doctor about these " medicines        Dose / Directions    atenolol 25 MG tablet   Commonly known as:  TENORMIN   Used for:  Benign essential hypertension        Dose:  25 mg   Take 1 tablet (25 mg) by mouth daily   Quantity:  90 tablet   Refills:  3       atorvastatin 20 MG tablet   Commonly known as:  LIPITOR   Used for:  Hyperlipidemia LDL goal <130        TAKE ONE TABLET BY MOUTH ONE TIME DAILY   Quantity:  90 tablet   Refills:  0       calcium carbonate 1250 MG tablet   Commonly known as:  OS-VALENTIN 500 mg [place]. Ca        Dose:  1000 mg   Take 1,000 mg by mouth daily   Refills:  0       clonazePAM 0.25 mg Tabs half-tab   Commonly known as:  klonoPIN        Refills:  0       HYDROcodone-acetaminophen 5-325 MG   Commonly known as:  NORCO        Refills:  0       IBUPROFEN PO        Dose:  600 mg   Take 600 mg by mouth   Refills:  0       LEXAPRO PO        Dose:  30 mg   Take 30 mg by mouth daily   Refills:  0       omega-3 fatty acids 1200 MG capsule        Dose:  1 capsule   Take 1 capsule by mouth 3 times daily.   Refills:  0       VITAMIN C PO        Take by mouth daily   Refills:  0       vitamin D 1000 UNITS capsule        Dose:  1 capsule   Take 1 capsule by mouth daily.   Refills:  0                Protect others around you: Learn how to safely use, store and throw away your medicines at www.disposemymeds.org.             Medication List: This is a list of all your medications and when to take them. Check marks below indicate your daily home schedule. Keep this list as a reference.      Medications           Morning Afternoon Evening Bedtime As Needed    atenolol 25 MG tablet   Commonly known as:  TENORMIN   Take 1 tablet (25 mg) by mouth daily                                atorvastatin 20 MG tablet   Commonly known as:  LIPITOR   TAKE ONE TABLET BY MOUTH ONE TIME DAILY                                calcium carbonate 1250 MG tablet   Commonly known as:  OS-VALENTIN 500 mg [place]. Ca   Take 1,000 mg by mouth daily                                 clonazePAM 0.25 mg Tabs half-tab   Commonly known as:  klonoPIN                                HYDROcodone-acetaminophen 5-325 MG   Commonly known as:  NORCO                                IBUPROFEN PO   Take 600 mg by mouth                                LEXAPRO PO   Take 30 mg by mouth daily                                omega-3 fatty acids 1200 MG capsule   Take 1 capsule by mouth 3 times daily.                                VITAMIN C PO   Take by mouth daily                                vitamin D 1000 UNITS capsule   Take 1 capsule by mouth daily.

## 2018-02-21 NOTE — DISCHARGE INSTRUCTIONS
Home Care Instructions after a Sacroiliac Joint Injection    The sacroiliac joints lie next to the spine and connect the sacrum(the bottom of the spine) with the hip on both sides. There are two sacroiliac joints, one on the right and one on the left. Joint inflammation and/or dysfunction in this area can cause pain. In a sacroiliac joint injection, a local anesthetic (numbing medicine) is injected in or near the joint space. Steroids are often used to help with the anti-inflammatory process.     Activity  -You may resume most normal activity levels with the exception of strenuous activity. It is important for us to know if your pain with normal activity is relieved after this injection.  -DO NOT shower for 24 hours  -DO NOT remove bandaid for 24 hours    Pain  -You may experience soreness at the injection site for one or two days  -You may use an ice pack for 20 minutes every 2 hours for the first 24 hours  -You may use a heating pad after the first 24 hours  -You may use Tylenol (acetaminophen) every 4 hours or other pain medicines as     directed by your physician    You may experience numbness radiating into your legs. This numbness may last several hours. Until sensation returns to normal; please use caution in walking, climbing stairs, and stepping out of your vehicle, etc.    DID YOU RECEIVE SEDATION TODAY?  No      DID YOU RECEIVE STEROIDS TODAY?  Yes    Common side effects of steroids:  Not everyone will experience corticosteroid side effects. If side effects are experienced, they will gradually subside in the 7-10 day period following an injection. Most common side effects include:  -Flushed face and/or chest  -Feeling of warmth, particularly in the face but could be an overall feeling of warmth  -Increased blood sugar in diabetic patients  -Menstrual irregularities my occur. If taking hormone-based birth control an alternate method of birth control is recommended  -Sleep disturbances and/or mood swings  are possible  -Leg cramps      PLEASE KEEP TRACK OF YOUR SYMPTOMS AND NOTE YOUR IMPROVEMENT FOR YOUR DOCTOR.     Please contact us if you have:  -Severe pain  -Fever more than 101.5 degrees Fahrenheit  -Signs of infection at the injection site (redness, swelling, or drainage)    If you have questions, please contact our office at [phone] between the hours of 7:00 am and 3:00 pm Monday through Friday. After office hours you can contact the on call provider by dialing [phone]. If you need immediate attention, we recommend that you go to a hospital emergency room or dial [phone].

## 2018-02-23 NOTE — OP NOTE
PROCEDURE: BILATERAL SACROILIAC JOINT INJECTION    DIAGNOSIS: sacroiliitis    DESCRIPTION OF PROCEDURE: The patient was brought to the fluoroscopy suite.  IV access was obtained prior to the procedure. The patient was positioned prone on the fluoroscopy table. Continuous hemodynamic monitoring was initiated including blood pressure, EKG, and pulse oximetry. IV sedation was administered incrementally to allow the patient to remain comfortable and conversant throughout the procedure. The lumbosacral area was prepped with Betadine three times and draped into a sterile field. The skin overlying the BILATERAL side sacroiliac joint was anesthetized using 3cc of lidocaine 1%. The inferior pole of the sacroiliac joint was identified by cephalo-oblique fluoroscopy. A 22-gauge, 3.5 inch spinal needle was slowly advanced through the sacroiliac joint capsule under fluoroscopic guidance. The needle position was confirmed using oblique, AP and lateral fluoroscopic imaging.  Negative aspiration was confirmed. A combination of 4cc of bupivacaine 0.5% and 40mg Kenalog was easily injected, into each joint. The needles were removed and bleeding was nil.  A sterile dressing was applied.     No sedation was given.

## 2018-04-28 ENCOUNTER — HEALTH MAINTENANCE LETTER (OUTPATIENT)
Age: 58
End: 2018-04-28

## 2018-06-18 ENCOUNTER — TELEPHONE (OUTPATIENT)
Dept: ANESTHESIOLOGY | Facility: CLINIC | Age: 58
End: 2018-06-18

## 2018-06-18 NOTE — TELEPHONE ENCOUNTER
Health Call Center    Phone Message    May a detailed message be left on voicemail: yes    Reason for Call: Other: Rosemarie would like to schedule another injection with Dr. Richardson.  If a clinic appointment is necessary please let the pt know.  Otherwise, she recalls Dr. Richardson indicating that she would be comfortable just ordering the injections.     Action Taken: Message routed to:  [facility] clinic coord

## 2018-06-20 ENCOUNTER — TELEPHONE (OUTPATIENT)
Dept: ANESTHESIOLOGY | Facility: CLINIC | Age: 58
End: 2018-06-20

## 2018-06-20 NOTE — TELEPHONE ENCOUNTER
----- Message from Christen Richardson MD sent at 6/20/2018  7:39 AM CDT -----  Patrick Amador.  Her SI joint injections were on 2/21, so it has been long enough that we can repeat them.    ThanksChristen      ----- Message -----     From: Ganesh Tavera RN     Sent: 6/19/2018   3:21 PM       To: MD Costa Byrne,    This patient calls in today to request a repeat bilat SI joint injection. She states the first two injection provided great pain relief (the first one provided 100% pain relief for 7 days and then she injured herself 2/2 her father falling and her catching him), but she injured herself lifting both times.     She is inquiring if you would be okay repeating a bilat SI joint injection again.     Side note: she is also in the process of getting an SI joint fusion through Dr. Hemphill but she does not have an appt yet with him and the proc has not been approved but she was notified that they will likely get it approved this time. If that surgery appt will not happen soon, she would like to have the SI joint injection first. If the surgery with Dr. Hemphill will happen soon, she will cancel her proc appt with you.    Thank you, Ganesh

## 2018-06-20 NOTE — TELEPHONE ENCOUNTER
Called and left  for Rosemarie that she may schedule bilateral SI joint injection if she is not having surgery soon with Dr. Hemphill. If she will have surgery with Dr. Hemphill, then to find out if the SI joint injection will be contraindicated.

## 2018-06-25 ENCOUNTER — TELEPHONE (OUTPATIENT)
Dept: ANESTHESIOLOGY | Facility: CLINIC | Age: 58
End: 2018-06-25

## 2018-06-25 DIAGNOSIS — M53.3 SI (SACROILIAC) JOINT DYSFUNCTION: Primary | ICD-10-CM

## 2018-06-25 NOTE — TELEPHONE ENCOUNTER
Received voicemail message from patient requesting a call back to schedule her procedure with Dr. Richardson.

## 2018-06-26 NOTE — TELEPHONE ENCOUNTER
Rosemarie has been scheduled for her procedure on 7/13/2018 with Dr. Connelly.     She is aware that she needs to have a , her  or one of her daughters will bring her.

## 2018-07-09 ENCOUNTER — TELEPHONE (OUTPATIENT)
Dept: ORTHOPEDICS | Facility: CLINIC | Age: 58
End: 2018-07-09

## 2018-07-09 DIAGNOSIS — M46.1 SACROILIITIS (H): Primary | ICD-10-CM

## 2018-07-09 NOTE — TELEPHONE ENCOUNTER
[facility] Call Center    Phone Message    May a detailed message be left on voicemail: yes    Reason for Call: Order(s): MRI/Xrays  Reason for requested: SI FUSION SURGERY- Rosemarie has spoken with Madonna and [facility] has approved her surgery.  Madonna indicated that Jaymie would be in contact with orders.  Orders may be sent to [facility].  Radha [facility].  Date needed: asap  Provider name: Dr. Joel Hemphill      Action Taken: Message routed to:  [facility] ortho    See incorrect info in phone message.  I called & spoke to Madonna Prior Auth for surgery has not been approved.  In order for [facility] to approve, Madonna said need orders for XR of Lumbar spine & pelvis, hips & SacroIliac Joints & either MRI or CT scan of Lumbar spine & CT of pelvis,hips & SacroIliac Joints.  Also already has return appt. 8-8-18 for repeat physical exam needed.  Ordered XRs & CT's & I will schedule with pt.   S.O./Jaymie Jean RN.

## 2018-07-13 ENCOUNTER — RADIANT APPOINTMENT (OUTPATIENT)
Dept: RADIOLOGY | Facility: AMBULATORY SURGERY CENTER | Age: 58
End: 2018-07-13
Attending: ANESTHESIOLOGY
Payer: COMMERCIAL

## 2018-07-13 ENCOUNTER — HOSPITAL ENCOUNTER (OUTPATIENT)
Facility: AMBULATORY SURGERY CENTER | Age: 58
End: 2018-07-13
Attending: ANESTHESIOLOGY
Payer: COMMERCIAL

## 2018-07-13 ENCOUNTER — SURGERY (OUTPATIENT)
Age: 58
End: 2018-07-13

## 2018-07-13 VITALS
DIASTOLIC BLOOD PRESSURE: 93 MMHG | RESPIRATION RATE: 16 BRPM | TEMPERATURE: 97.6 F | SYSTOLIC BLOOD PRESSURE: 163 MMHG | OXYGEN SATURATION: 98 %

## 2018-07-13 DIAGNOSIS — M53.3 SI (SACROILIAC) JOINT DYSFUNCTION: ICD-10-CM

## 2018-07-13 RX ORDER — BUPIVACAINE HYDROCHLORIDE 5 MG/ML
INJECTION, SOLUTION EPIDURAL; INTRACAUDAL PRN
Status: DISCONTINUED | OUTPATIENT
Start: 2018-07-13 | End: 2018-07-13 | Stop reason: HOSPADM

## 2018-07-13 RX ORDER — LIDOCAINE HYDROCHLORIDE 10 MG/ML
INJECTION, SOLUTION EPIDURAL; INFILTRATION; INTRACAUDAL; PERINEURAL PRN
Status: DISCONTINUED | OUTPATIENT
Start: 2018-07-13 | End: 2018-07-13 | Stop reason: HOSPADM

## 2018-07-13 RX ORDER — TRIAMCINOLONE ACETONIDE 40 MG/ML
INJECTION, SUSPENSION INTRA-ARTICULAR; INTRAMUSCULAR PRN
Status: DISCONTINUED | OUTPATIENT
Start: 2018-07-13 | End: 2018-07-13 | Stop reason: HOSPADM

## 2018-07-13 RX ADMIN — TRIAMCINOLONE ACETONIDE 80 MG: 40 INJECTION, SUSPENSION INTRA-ARTICULAR; INTRAMUSCULAR at 12:20

## 2018-07-13 RX ADMIN — LIDOCAINE HYDROCHLORIDE 7 ML: 10 INJECTION, SOLUTION EPIDURAL; INFILTRATION; INTRACAUDAL; PERINEURAL at 12:19

## 2018-07-13 RX ADMIN — BUPIVACAINE HYDROCHLORIDE 8 ML: 5 INJECTION, SOLUTION EPIDURAL; INTRACAUDAL at 12:20

## 2018-07-13 NOTE — IP AVS SNAPSHOT
[facility]    [address] [place] [id number]    Phone:  [phone]    Fax:  [phone]                                       After Visit Summary   7/13/2018    Rosemarie Zabala    MRN: [id number]           After Visit Summary Signature Page     I have received my discharge instructions, and my questions have been answered. I have discussed any challenges I see with this plan with the nurse or doctor.    ..........................................................................................................................................  Patient/Patient Representative Signature      ..........................................................................................................................................  Patient Representative Print Name and Relationship to Patient    ..................................................               ................................................  Date                                            Time    ..........................................................................................................................................  Reviewed by Signature/Title    ...................................................              ..............................................  Date                                                            Time

## 2018-07-13 NOTE — IP AVS SNAPSHOT
MRN:[id number]                      After Visit Summary   7/13/2018    Rosemarie Zabala    MRN: [id number]           Thank you!     Thank you for choosing [facility] for your care. Our goal is always to provide you with excellent care. Hearing back from our patients is one way we can continue to improve our services. Please take a few minutes to complete the written survey that you may receive in the mail after you visit with us. Thank you!        Patient Information     Date Of Birth          1960        About your hospital stay     You were admitted on:  July 13, 2018 You last received care in the:  [facility]    You were discharged on:  July 13, 2018       Who to Call     For medical emergencies, please call 911.  For non-urgent questions about your medical care, please call your primary care provider or clinic, [phone]  For questions related to your surgery, please call your surgery clinic        Attending Provider     Provider Berny Patino MD Anesthesiology       Primary Care Provider Office Phone # Fax #    Shoshana Bhatti PA-C [phone]      Your next 10 appointments already scheduled     Aug 08, 2018  7:20 AM CDT   CT LUMBAR SPINE W/O CONTRAST with UCCT1   [facility] [place] [facility] ([facility])    [address] [address] [phone]           Please bring any scans or X-rays taken at other hospitals, if similar tests were done. Also bring a list of your medicines, including vitamins, minerals and over-the-counter drugs. It is safest to leave personal items at home.  Be sure to tell your doctor:   If you have any allergies.   If there s any chance you are pregnant.   If you are breastfeeding.  You do not need to do anything special to prepare for this exam.  Please wear loose clothing, such as a sweat suit or jogging clothes. Avoid snaps, zippers  and other metal. We may ask you to undress and put on a hospital gown.            Aug 08, 2018  7:40 AM CDT   CT PELVIS W/O CONTRAST with UCCT1   [facility] CT ([facility] [place])    [address] [id number] [phone]           Please bring any scans or X-rays taken at other hospitals, if similar tests were done. Also bring a list of your medicines, including vitamins, minerals and over-the-counter drugs. It is safest to leave personal items at home.  Be sure to tell your doctor:   If you have any allergies.   If there s any chance you are pregnant.   If you are breastfeeding.  How to prepare:   Do not eat or drink for 2 hours before your exam. If you need to take medicine, you may take it with small sips of water. (We may ask you to take liquid medicine as well.)   Please wear loose clothing, such as a sweat suit or jogging clothes. Avoid snaps, zippers and other metal. We may ask you to undress and put on a hospital gown.  Please arrive 30 minutes early for your CT. Once in the department you might be asked to drink water 15-20 minutes prior to your exam.  If indicated you may be asked to drink an oral contrast in advance of your CT.  If this is the case, the imaging team will let you know or be in contact with you prior to your appointment  Patients over 70 or patients with diabetes or kidney problems:   If you haven t had a blood test (creatinine test) within the last 30 days, the Cardiologist/Radiologist may require you to get this test prior to your exam.  If you have diabetes:   Continue to take your metformin medication on the day of your exam  If you have any questions, please call the Imaging Department where you will have your exam.            Aug 08, 2018  8:45 AM CDT   (Arrive by 8:15 AM)   RETURN SPINE with Joel Hemphill MD   [facility] ([facility] [place])    [address]  [facility] [address] [phone]              Further instructions from your care team       Home Care Instructions after a Sacroiliac Joint Injection    The sacroiliac joints lie next to the spine and connect the sacrum(the bottom of the spine) with the hip on both sides. There are two sacroiliac joints, one on the right and one on the left. Joint inflammation and/or dysfunction in this area can cause pain. In a sacroiliac joint injection, a local anesthetic (numbing medicine) is injected in or near the joint space. Steroids are often used to help with the anti-inflammatory process.     Activity  -You may resume most normal activity levels with the exception of strenuous activity. It is important for us to know if your pain with normal activity is relieved after this injection.  -DO NOT shower for 24 hours  -DO NOT remove bandaid for 24 hours    Pain  -You may experience soreness at the injection site for one or two days  -You may use an ice pack for 20 minutes every 2 hours for the first 24 hours  -You may use a heating pad after the first 24 hours  -You may use Tylenol (acetaminophen) every 4 hours or other pain medicines as     directed by your physician    You may experience numbness radiating into your legs. This numbness may last several hours. Until sensation returns to normal; please use caution in walking, climbing stairs, and stepping out of your vehicle, etc.    DID YOU RECEIVE SEDATION TODAY?  No    If you received sedation please follow these additional safety measures.  Sedation medicine, if given, may remain active for many hours. It is important for the next 24 hours that you do not:  -Drive a car  -Operate machines or power tools  -Consume alcohol, including beer  -Sign any important papers or legal documents    DID YOU RECEIVE STEROIDS TODAY?  Yes    Common side effects of steroids:  Not everyone will experience corticosteroid side effects. If side effects are experienced, they will  gradually subside in the 7-10 day period following an injection. Most common side effects include:  -Flushed face and/or chest  -Feeling of warmth, particularly in the face but could be an overall feeling of warmth  -Increased blood sugar in diabetic patients  -Menstrual irregularities my occur. If taking hormone-based birth control an alternate method of birth control is recommended  -Sleep disturbances and/or mood swings are possible  -Leg cramps      PLEASE KEEP TRACK OF YOUR SYMPTOMS AND NOTE YOUR IMPROVEMENT FOR YOUR DOCTOR.     Please contact us if you have:  -Severe pain  -Fever more than 101.5 degrees Fahrenheit  -Signs of infection at the injection site (redness, swelling, or drainage)    If you have questions, please contact our office at [phone] between the hours of 7:00 am and 3:00 pm Monday through Friday. After office hours you can contact the on call provider by dialing [phone]. If you need immediate attention, we recommend that you go to a hospital emergency room or dial [phone].      Pending Results     No orders found from 7/11/2018 to 7/14/2018.            Admission Information     Date & Time Provider Department Dept. Phone    7/13/2018 Berny Connelly MD [facility] [phone]      Your Vitals Were     Blood Pressure Temperature Respirations Last Period Pulse Oximetry       154/85 97.6  F (36.4  C) (Temporal) 16 12/14/2012 100%       [employer] Information     [employer] gives you secure access to your electronic health record. If you see a primary care provider, you can also send messages to your care team and make appointments. If you have questions, please call your primary care clinic.  If you do not have a primary care provider, please call [phone] and they will assist you.      [employer] is an electronic gateway that provides easy, online access to your medical records. With [employer], you can request a clinic appointment, read your test  results, renew a prescription or communicate with your care team.     To access your existing account, please contact your [facility] or call [phone] for assistance.        Care EveryWhere ID     This is your Care EveryWhere ID. This could be used by other organizations to access your [place] medical records  [id number]        Equal Access to Services     ELIZABETH CHRISTIAN : Hadtom sauceda hadasho Soomaali, waaxda luqadaha, qaybta kaalmada stefficynda, fariha pandavalerieseamus hoff . So [facility] [phone].    ATENCIÓN: Si habla español, tiene a garcia disposición servicios gratuitos de asistencia lingüística. Colin al [phone].    We comply with applicable federal civil rights laws and [place] laws. We do not discriminate on the basis of race, color, national origin, age, disability, sex, sexual orientation, or gender identity.               Review of your medicines      UNREVIEWED medicines. Ask your doctor about these medicines        Dose / Directions    atenolol 25 MG tablet   Commonly known as:  TENORMIN   Used for:  Benign essential hypertension        Dose:  25 mg   Take 1 tablet (25 mg) by mouth daily   Quantity:  90 tablet   Refills:  3       atorvastatin 20 MG tablet   Commonly known as:  LIPITOR   Used for:  Hyperlipidemia LDL goal <130        TAKE ONE TABLET BY MOUTH ONE TIME DAILY   Quantity:  90 tablet   Refills:  0       calcium carbonate 500 MG tablet   Commonly known as:  OS-VALENTIN 500 mg [place]. Ca        Dose:  1000 mg   Take 1,000 mg by mouth daily   Refills:  0       clonazePAM 0.25 mg Tabs half-tab   Commonly known as:  klonoPIN        Refills:  0       IBUPROFEN PO        Dose:  600 mg   Take 600 mg by mouth   Refills:  0       LEXAPRO PO        Dose:  30 mg   Take 30 mg by mouth daily   Refills:  0       omega-3 fatty acids 1200 MG capsule        Dose:  1 capsule   Take 1 capsule by mouth 3 times daily.   Refills:  0       VITAMIN C PO        Take by mouth  daily   Refills:  0       vitamin D 1000 units capsule        Dose:  1 capsule   Take 1 capsule by mouth daily.   Refills:  0                Protect others around you: Learn how to safely use, store and throw away your medicines at www.disposemymeds.org.             Medication List: This is a list of all your medications and when to take them. Check marks below indicate your daily home schedule. Keep this list as a reference.      Medications           Morning Afternoon Evening Bedtime As Needed    atenolol 25 MG tablet   Commonly known as:  TENORMIN   Take 1 tablet (25 mg) by mouth daily                                atorvastatin 20 MG tablet   Commonly known as:  LIPITOR   TAKE ONE TABLET BY MOUTH ONE TIME DAILY                                calcium carbonate 500 MG tablet   Commonly known as:  OS-VALENTIN 500 mg [place]. Ca   Take 1,000 mg by mouth daily                                clonazePAM 0.25 mg Tabs half-tab   Commonly known as:  klonoPIN                                IBUPROFEN PO   Take 600 mg by mouth                                LEXAPRO PO   Take 30 mg by mouth daily                                omega-3 fatty acids 1200 MG capsule   Take 1 capsule by mouth 3 times daily.                                VITAMIN C PO   Take by mouth daily                                vitamin D 1000 units capsule   Take 1 capsule by mouth daily.

## 2018-07-13 NOTE — DISCHARGE INSTRUCTIONS
Home Care Instructions after a Sacroiliac Joint Injection    The sacroiliac joints lie next to the spine and connect the sacrum(the bottom of the spine) with the hip on both sides. There are two sacroiliac joints, one on the right and one on the left. Joint inflammation and/or dysfunction in this area can cause pain. In a sacroiliac joint injection, a local anesthetic (numbing medicine) is injected in or near the joint space. Steroids are often used to help with the anti-inflammatory process.     Activity  -You may resume most normal activity levels with the exception of strenuous activity. It is important for us to know if your pain with normal activity is relieved after this injection.  -DO NOT shower for 24 hours  -DO NOT remove bandaid for 24 hours    Pain  -You may experience soreness at the injection site for one or two days  -You may use an ice pack for 20 minutes every 2 hours for the first 24 hours  -You may use a heating pad after the first 24 hours  -You may use Tylenol (acetaminophen) every 4 hours or other pain medicines as     directed by your physician    You may experience numbness radiating into your legs. This numbness may last several hours. Until sensation returns to normal; please use caution in walking, climbing stairs, and stepping out of your vehicle, etc.    DID YOU RECEIVE SEDATION TODAY?  No    If you received sedation please follow these additional safety measures.  Sedation medicine, if given, may remain active for many hours. It is important for the next 24 hours that you do not:  -Drive a car  -Operate machines or power tools  -Consume alcohol, including beer  -Sign any important papers or legal documents    DID YOU RECEIVE STEROIDS TODAY?  Yes    Common side effects of steroids:  Not everyone will experience corticosteroid side effects. If side effects are experienced, they will gradually subside in the 7-10 day period following an injection. Most common side effects  include:  -Flushed face and/or chest  -Feeling of warmth, particularly in the face but could be an overall feeling of warmth  -Increased blood sugar in diabetic patients  -Menstrual irregularities my occur. If taking hormone-based birth control an alternate method of birth control is recommended  -Sleep disturbances and/or mood swings are possible  -Leg cramps      PLEASE KEEP TRACK OF YOUR SYMPTOMS AND NOTE YOUR IMPROVEMENT FOR YOUR DOCTOR.     Please contact us if you have:  -Severe pain  -Fever more than 101.5 degrees Fahrenheit  -Signs of infection at the injection site (redness, swelling, or drainage)    If you have questions, please contact our office at [phone] between the hours of 7:00 am and 3:00 pm Monday through Friday. After office hours you can contact the on call provider by dialing [phone]. If you need immediate attention, we recommend that you go to a hospital emergency room or dial [phone].

## 2018-08-01 ASSESSMENT — ENCOUNTER SYMPTOMS
EXERCISE INTOLERANCE: 0
MYALGIAS: 1
SLEEP DISTURBANCES DUE TO BREATHING: 0
VOMITING: 0
PANIC: 0
MUSCLE WEAKNESS: 0
NECK PAIN: 0
NERVOUS/ANXIOUS: 1
ABDOMINAL PAIN: 0
LIGHT-HEADEDNESS: 0
JOINT SWELLING: 0
DIARRHEA: 0
CONSTIPATION: 1
JAUNDICE: 0
BOWEL INCONTINENCE: 0
BLOOD IN STOOL: 0
ORTHOPNEA: 0
LEG PAIN: 0
DECREASED CONCENTRATION: 0
DEPRESSION: 1
HEARTBURN: 0
INSOMNIA: 1
BLOATING: 1
HYPERTENSION: 1
ARTHRALGIAS: 1
PALPITATIONS: 0
SYNCOPE: 0
BACK PAIN: 1
HYPOTENSION: 0
RECTAL PAIN: 0
NAUSEA: 0
STIFFNESS: 1
MUSCLE CRAMPS: 1

## 2018-08-02 ENCOUNTER — HOSPITAL ENCOUNTER (OUTPATIENT)
Dept: MAMMOGRAPHY | Facility: CLINIC | Age: 58
Discharge: HOME | End: 2018-08-02
Admitting: RADIOLOGY
Payer: COMMERCIAL

## 2018-08-02 DIAGNOSIS — Z12.31 VISIT FOR SCREENING MAMMOGRAM: ICD-10-CM

## 2018-08-02 PROCEDURE — 77067 SCR MAMMO BI INCL CAD: CPT

## 2018-08-08 ENCOUNTER — RADIANT APPOINTMENT (OUTPATIENT)
Dept: GENERAL RADIOLOGY | Facility: CLINIC | Age: 58
End: 2018-08-08
Attending: ORTHOPAEDIC SURGERY
Payer: COMMERCIAL

## 2018-08-08 ENCOUNTER — OFFICE VISIT (OUTPATIENT)
Dept: ORTHOPEDICS | Facility: CLINIC | Age: 58
End: 2018-08-08
Payer: COMMERCIAL

## 2018-08-08 ENCOUNTER — RADIANT APPOINTMENT (OUTPATIENT)
Dept: CT IMAGING | Facility: CLINIC | Age: 58
End: 2018-08-08
Attending: ORTHOPAEDIC SURGERY
Payer: COMMERCIAL

## 2018-08-08 ENCOUNTER — RADIANT APPOINTMENT (OUTPATIENT)
Dept: GENERAL RADIOLOGY | Facility: CLINIC | Age: 58
End: 2018-08-08
Attending: ORTHOPAEDIC SURGERY

## 2018-08-08 VITALS — WEIGHT: 197.3 LBS | BODY MASS INDEX: 30.97 KG/M2 | HEIGHT: 67 IN

## 2018-08-08 DIAGNOSIS — M46.1 SACROILIITIS (H): ICD-10-CM

## 2018-08-08 DIAGNOSIS — M47.816 FACET ARTHRITIS OF LUMBAR REGION: ICD-10-CM

## 2018-08-08 DIAGNOSIS — M46.1 SACROILIITIS (H): Primary | ICD-10-CM

## 2018-08-08 NOTE — PROGRESS NOTES
[facility]  Orthopedics  Joel Hemphill MD  2018     Name: Rosemarie Zabala  MRN: [id number]  Age: 58 year old  : 1960  Referring provider: Referred Self     Chief Complaint: SI joint pain     History of Present Illness:   Rosemarie Zabala is a 58 year old female who presents today for discussion of SI joint fusion. I last evaluated the patient on 17, at which time we discussed her previous evaluations with Dr. Zabala and Dr. Verdugo at [facility], who both felt there her primary pain generator was her right SI joint. They explained that her insurance did not cover surgical intervention so she underwent 5 injections of PRP and amniotic fluid at unknown sites. However, her pain was significant worse after these injections.she has had multiple attempts at acupuncture, physical therapy, massage, and chiropractor visits. Each with no significant improvement of her pain. The pain was worse with any activity and she was getting by with 3 Tylenol at night and Ibuprofen throughout the day. There were no relieving factors for her. Her overall ADL's had decreased due to the pain and her quality of life was diminishing.     Per telephone note on 18, for authorization of her surgery she required XR of the Lumbar spine pelvis, hips and sacroiliac joints and either MRI or CT scan of the lumbar spine and CT of pelvis, hips, and sacroiliac joints. These were ordered. Today, Rosemarie is here for discussion of her imaging results. She voices that she has had three SI joint CT guided injections with her last on 18. She has been approved to have this every three months due to the severity of her pain. With each Injection she obtained about 7-8 days of relief and about 75-80% resolution of pain. She feels that her pain is debilitating and altering her normal life, endorsing that if only 50% of her pain was decreased this would drastically change things. Her current pain is still in the lower bilateral back  "and is constant with occasional numbness and tingling. She has continued to have decreased quality of life and is ill managed on Ibuprofen/ Tylenol.      MIKO: 42.5   Wrnelz00: 24  Pain scale: 6      Review of Systems:   Answers for HPI/ROS submitted by the patient on 8/1/2018   General Symptoms: No  Skin Symptoms: No  HENT Symptoms: No  EYE SYMPTOMS: No  HEART SYMPTOMS: Yes  LUNG SYMPTOMS: No  INTESTINAL SYMPTOMS: Yes  URINARY SYMPTOMS: No  GYNECOLOGIC SYMPTOMS: No  BREAST SYMPTOMS: No  SKELETAL SYMPTOMS: Yes  BLOOD SYMPTOMS: No  NERVOUS SYSTEM SYMPTOMS: No  MENTAL HEALTH SYMPTOMS: Yes  Chest pain or pressure: No  Fast or irregular heartbeat: No  Pain in legs with walking: No  Trouble breathing while lying down: No  Fingers or toes appear blue: No  High blood pressure: Yes  Low blood pressure: No  Fainting: No  Murmurs: No  Pacemaker: No  Varicose veins: No  Edema or swelling: No  Wake up at night with shortness of breath: No  Light-headedness: No  Exercise intolerance: No  Heart burn or indigestion: No  Nausea: No  Vomiting: No  Abdominal pain: No  Bloating: Yes  Constipation: Yes  Diarrhea: No  Blood in stool: No  Black stools: No  Rectal or Anal pain: No  Fecal incontinence: No  Yellowing of skin or eyes: No  Vomit with blood: No  Change in stools: No  Back pain: Yes  Muscle aches: Yes  Neck pain: No  Swollen joints: No  Joint pain: Yes  Bone pain: No  Muscle cramps: Yes  Muscle weakness: No  Joint stiffness: Yes  Bone fracture: No  Nervous or Anxious: Yes  Depression: Yes  Trouble sleeping: Yes  Trouble thinking or concentrating: No  Mood changes: Yes  Panic attacks: No      Physical Examination:  Height 1.695 m (5' 6.73\"), weight 89.5 kg (197 lb 4.8 oz), last menstrual period 12/14/2012,  Positive Rachael's point finger test on the right side. Midline sacrum tender but not primary pain complaint. Primary pain posterior sacroiliac spine tenderness. Facet loading positive but not primary pain.     Radiographs: "   Radiographs of the spine - 2 views (08/08/2018)    Radiographs for Leg length evaluation (08/08/2018)    CT Lumbar spine without contrast (08/08/2018)    CT Pelvis without contrast (08/08/2018):  IMPRESSION:  1. Mild degenerative changes in the sacroiliac joints, without  definite erosive changes.  2. Degenerative disc disease in the visualized lower lumbar spine with  disc space narrowing and vacuum disc phenomenon at L5-S1. There is  also irregularity along the anterior superior aspect of the S1  vertebral body with osteophytic spurring.  8. Mild degenerative changes in the bilateral hips.   Per radiology report     I have independently reviewed the above imaging studies; the results were discussed with the patient.     Spine normal sagittal and coronal alignment. Pelvis slightly upsloped sacral ala bilateral (mildly dysmorphic). Facet arthropathy L4-5 and L5-S1 best seen on CT scan.    Assessment:   1. Sacroiliitis   2. Facet arthropathy by imaging but not symptomatic on exam.    Plan:   We discussed the patient's imaging today. Films reveal spinal alignment is okay though I do appreciate some sacral dysmorphism. CT lumbar spine show facet joints looking good. There is facet arthropathy and degeneration at the L4-5 and L5-S1 level. There is a facet cyst at the L4-5 level. Overall, imaging suggests pain originating from the SI joint.     This patient meets the TRISH and ISASS criteria for minimally invasive SI fusion. These include 3/5 physical exam manuevers being positive (noted in previous visits), a positive response to injections with the most recent providing 75-80% relief, failure of extensive non-surgical management resulting in life debilitating changes, and CT imaging that rules out other possible pain generators (MRI of lumbar spine showing the sacrum, and plain films showing preserved joint space) as noted today and previous visits. There is no history of/or current pain behavior or pain disorder.      We discussed minimally invasive sacroiliac joint fusion in detail.  About 70-80% of patients who have an SI fusion experience roughly 50% improvement in symptoms.  About 30% of my patients have gone on to need the contralateral side fused as well.  We discussed the procedure in detail, and we reviewed the relevant anatomy using a model of the pelvis.  Weight bearing on the surgical side will be limited for three weeks after surgery, and she will need crutches during this time.  Physical therapy will be initiated after three weeks, and full recovery typically takes 6-12 weeks.      We reviewed the risks and benefits of the surgery in detail. The risks include those associated with anesthesia, including death, pulmonary embolism, DVT, stroke, myocardial infarction, pneumonia, blindness, and urinary tract infection. Additional risks include the risk of blood loss, infection, nerve damage, failure to heal, hardware problems and failure of the intervention to improve her symptoms.  With regard to blood loss, we use a medication called tranexamic acid.  Blood loss is typically around 25 cc during an SI fusion, and we have not had to transfuse anyone during or after this particular procedure.  To mitigate the risk of infection, we use IV antibiotics.  Deep infections after this surgery are rare.  To avoid hardware problems and nerve damage, we use an intra-operative CT, which is like GPS for the spine. She understands the risks of the surgery and wishes to proceed.     Scribe Disclosure:   I, Dave Dean, am serving as a scribe to document services personally performed by Joel Hemphill MD at this visit, based upon the provider's statements to me. All documentation has been reviewed by the aforementioned provider prior to being entered into the official medical record.     Portions of this medical record were completed by a scribe. UPON MY REVIEW AND AUTHENTICATION BY ELECTRONIC SIGNATURE, this confirms (a) I  performed the applicable clinical services, and (b) the record is accurate.    Joel Hemphill MD     Addendum:   The planned implants for surgery are triangular titanium rods from [employer], the on label FDA approved use.

## 2018-08-08 NOTE — NURSING NOTE
"Reason For Visit: No chief complaint on file.      Primary MD: Shoshana Bhatti    ?  No  Occupation [employer] for [employer]   Currently working? Yes.  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 Work related tasks, arthritis in L4-5, Previous injections in Facet Joints   Date of surgery: NA  Type of surgery: NA.  Smoker: No  Request smoking cessation information: No    Ht 1.695 m (5' 6.73\")  Wt 89.5 kg (197 lb 4.8 oz)  LMP 12/14/2012  BMI 31.15 kg/m2    Pain Assessment  Patient Currently in Pain: Yes  0-10 Pain Scale: 6  Primary Pain Location: Back  Pain Orientation: Lower, Left, Right  Pain Descriptors: Burning, Constant, Numbness, Tingling (numbness in both legs at times)  Alleviating Factors: Cartico steroid (14 days of relief from injection )  Aggravating Factors: Movement, Sitting, Standing, Bending, Lying    Oswestry (MIKO) Questionnaire    OSWESTRY DISABILITY INDEX 8/1/2018   Count 8   Sum 17   Oswestry Score (%) 42.5   Some recent data might be hidden      Visual Analog Pain Scale  Back Pain Scale 0-10: 8  Right leg pain: 5.5  Left leg pain: 5.5    Promis 10 Assessment    PROMIS 10 8/1/2018   In general, would you say your health is: Good   In general, would you say your quality of life is: Very good   In general, how would you rate your physical health? Good   In general, how would you rate your mental health, including your mood and your ability to think? Good   In general, how would you rate your satisfaction with your social activities and relationships? Fair   In general, please rate how well you carry out your usual social activities and roles Very good   To what extent are you able to carry out your everyday physical activities such as walking, climbing stairs, carrying groceries, or moving a chair? Mostly   How often have you been bothered by emotional problems such as feeling anxious, depressed or irritable? Sometimes   How would you rate " your fatigue on average? Moderate   How would you rate your pain on average?   0 = No Pain  to  10 = Worst Imaginable Pain 7   Global Physical Health Score : Raw Score -   Global Mental Health Score : Raw Score -   Total (Physical + Mental Health Score) -   In general, would you say your health is: 3   In general, would you say your quality of life is: 4   In general, how would you rate your physical health? 3   In general, how would you rate your mental health, including your mood and your ability to think? 3   In general, how would you rate your satisfaction with your social activities and relationships? 2   In general, please rate how well you carry out your usual social activities and roles. (This includes activities at home, at work and in your community, and responsibilities as a parent, child, spouse, employee, friend, etc.) 4   To what extent are you able to carry out your everyday physical activities such as walking, climbing stairs, carrying groceries, or moving a chair? 4   In the past 7 days, how often have you been bothered by emotional problems such as feeling anxious, depressed, or irritable? 3   In the past 7 days, how would you rate your fatigue on average? 3   In the past 7 days, how would you rate your pain on average, where 0 means no pain, and 10 means worst imaginable pain? 7   Global Mental Health Score 12   Global Physical Health Score 12   PROMIS TOTAL - SUBSCORES 24   Some recent data might be hidden          Lay Guzmán ATC

## 2018-08-08 NOTE — MR AVS SNAPSHOT
"              After Visit Summary   8/8/2018    Rosemarie Zabala    MRN: [id number]           Patient Information     Date Of Birth          1960        Visit Information        Provider Department      8/8/2018 8:45 AM Joel Hemphill [facility]        Today's Diagnoses     Sacroiliitis (H)    -  1    Facet arthritis of lumbar region (H)           Follow-ups after your visit        Who to contact     Please call your clinic at [phone] to:    Ask questions about your health    Make or cancel appointments    Discuss your medicines    Learn about your test results    Speak to your doctor            Additional Information About Your Visit        MyChart Information     [employer] gives you secure access to your electronic health record. If you see a primary care provider, you can also send messages to your care team and make appointments. If you have questions, please call your primary care clinic.  If you do not have a primary care provider, please call [phone] and they will assist you.      [employer] is an electronic gateway that provides easy, online access to your medical records. With [employer] you can request a clinic appointment, read your test results, renew a prescription or communicate with your care team.     To access your existing account, please contact your [facility] or call [phone] for assistance.        Care EveryWhere ID     This is your Care EveryWhere ID. This could be used by other organizations to access your [facility] medical records  [id number]        Your Vitals Were     Height Last Period BMI (Body Mass Index)             1.695 m (5' 6.73\") 12/14/2012 31.15 kg/m2          Blood Pressure from Last 3 Encounters:   07/13/18 (!) 163/93   02/21/18 138/82   01/29/18 135/84    Weight from Last 3 Encounters:   08/08/18 89.5 kg (197 lb 4.8 oz)   02/21/18 88.5 kg (195 lb)   01/29/18 88.5 kg (195 lb)              We Performed the " Following     Fariba-Operative Worksheet        Primary Care Provider Office Phone # Fax #    Shoshana Bhatti PA-C [phone]       [address] [place] [address]  [facility] [address]        Equal Access to Services     ELIZABETH CHRISTIAN : Hadii roselyn ku haddelioo Soomaali, waaxda luqadaha, qaybta kaalmada adeegyada, waxvilma idiin haygiselan adekat fields kavya de paz. So [facility] [phone].    ATENCIÓN: Si habla español, tiene a garcia disposición servicios gratuitos de asistencia lingüística. Llame al [phone].    We comply with applicable federal civil rights laws and [place] laws. We do not discriminate on the basis of race, color, national origin, age, disability, sex, sexual orientation, or gender identity.            Thank you!     Thank you for choosing [facility]  for your care. Our goal is always to provide you with excellent care. Hearing back from our patients is one way we can continue to improve our services. Please take a few minutes to complete the written survey that you may receive in the mail after your visit with us. Thank you!             Your Updated Medication List - Protect others around you: Learn how to safely use, store and throw away your medicines at www.disposemymeds.org.          This list is accurate as of 8/8/18 10:43 AM.  Always use your most recent med list.                   Brand Name Dispense Instructions for use Diagnosis    atenolol 25 MG tablet    TENORMIN    90 tablet    Take 1 tablet (25 mg) by mouth daily    Benign essential hypertension       atorvastatin 20 MG tablet    LIPITOR    90 tablet    TAKE ONE TABLET BY MOUTH ONE TIME DAILY    Hyperlipidemia LDL goal <130       calcium carbonate 500 MG tablet    OS-VALENTIN 500 mg [place]. [place]     Take 1,000 mg by mouth daily        clonazePAM 0.25 mg Tabs half-tab    klonoPIN          IBUPROFEN PO      Take 600 mg by mouth        LEXAPRO PO      Take 30 mg by mouth daily        omega-3 fatty acids 1200 MG capsule       Take 1 capsule by mouth 3 times daily.        VITAMIN C PO      Take by mouth daily        vitamin D 1000 units capsule      Take 1 capsule by mouth daily.

## 2018-08-08 NOTE — LETTER
2018      RE: Rosemarie Zabala  [address] [place] [address]       [facility]  Orthopedics  Joel Hemphill MD  2018     Name: Rosemarie Zabala  MRN: [id number]  Age: 58 year old  : 1960  Referring provider: Referred Self     Chief Complaint: SI joint pain     History of Present Illness:   Rosemarie Zabala is a 58 year old female who presents today for discussion of SI joint fusion. I last evaluated the patient on 17, at which time we discussed her previous evaluations with Dr. Zabala and Dr. Verdugo at [facility], who both felt there her primary pain generator was her right SI joint. They explained that her insurance did not cover surgical intervention so she underwent 5 injections of PRP and amniotic fluid at unknown sites. However, her pain was significant worse after these injections.she has had multiple attempts at acupuncture, physical therapy, massage, and chiropractor visits. Each with no significant improvement of her pain. The pain was worse with any activity and she was getting by with 3 Tylenol at night and Ibuprofen throughout the day. There were no relieving factors for her. Her overall ADL's had decreased due to the pain and her quality of life was diminishing.     Per telephone note on 18, for authorization of her surgery she required XR of the Lumbar spine pelvis, hips and sacroiliac joints and either MRI or CT scan of the lumbar spine and CT of pelvis, hips, and sacroiliac joints. These were ordered. Today, Rosemarie is here for discussion of her imaging results. She voices that she has had three SI joint CT guided injections with her last on 18. She has been approved to have this every three months due to the severity of her pain. With each Injection she obtained about 7-8 days of relief and about 75-80% resolution of pain. She feels that her pain is debilitating and altering her normal life, endorsing that if only 50% of her pain was decreased this would  "drastically change things. Her current pain is still in the lower bilateral back and is constant with occasional numbness and tingling. She has continued to have decreased quality of life and is ill managed on Ibuprofen/ Tylenol.      MIKO: 42.5   Dtfcoz58: 24  Pain scale: 6        Physical Examination:  Height 1.695 m (5' 6.73\"), weight 89.5 kg (197 lb 4.8 oz), last menstrual period 12/14/2012,  Positive Rachael's point finger test on the right side. Midline sacrum tender but not primary pain complaint. Primary pain posterior sacroiliac spine tenderness. Facet loading positive but not primary pain.     Radiographs:   Radiographs of the spine - 2 views (08/08/2018)    Radiographs for Leg length evaluation (08/08/2018)    CT Lumbar spine without contrast (08/08/2018)    CT Pelvis without contrast (08/08/2018):  IMPRESSION:  1. Mild degenerative changes in the sacroiliac joints, without  definite erosive changes.  2. Degenerative disc disease in the visualized lower lumbar spine with  disc space narrowing and vacuum disc phenomenon at L5-S1. There is  also irregularity along the anterior superior aspect of the S1  vertebral body with osteophytic spurring.  8. Mild degenerative changes in the bilateral hips.   Per radiology report     I have independently reviewed the above imaging studies; the results were discussed with the patient.     Spine normal sagittal and coronal alignment. Pelvis slightly upsloped sacral ala bilateral (mildly dysmorphic). Facet arthropathy L4-5 and L5-S1 best seen on CT scan.    Assessment:   1. Sacroiliitis   2. Facet arthropathy by imaging but not symptomatic on exam.    Plan:   We discussed the patient's imaging today. Films reveal spinal alignment is okay though I do appreciate some sacral dysmorphism. CT lumbar spine show facet joints looking good. There is facet arthropathy and degeneration at the L4-5 and L5-S1 level. There is a facet cyst at the L4-5 level. Overall, imaging suggests " pain originating from the SI joint.     This patient meets the TRISH and ISASS criteria for minimally invasive SI fusion. These include 3/5 physical exam manuevers being positive (noted in previous visits), a positive response to injections with the most recent providing 75-80% relief, failure of extensive non-surgical management resulting in life debilitating changes, and CT imaging that rules out other possible pain generators (MRI of lumbar spine showing the sacrum, and plain films showing preserved joint space) as noted today and previous visits. There is no history of/or current pain behavior or pain disorder.     We discussed minimally invasive sacroiliac joint fusion in detail.  About 70-80% of patients who have an SI fusion experience roughly 50% improvement in symptoms.  About 30% of my patients have gone on to need the contralateral side fused as well.  We discussed the procedure in detail, and we reviewed the relevant anatomy using a model of the pelvis.  Weight bearing on the surgical side will be limited for three weeks after surgery, and she will need crutches during this time.  Physical therapy will be initiated after three weeks, and full recovery typically takes 6-12 weeks.      We reviewed the risks and benefits of the surgery in detail. The risks include those associated with anesthesia, including death, pulmonary embolism, DVT, stroke, myocardial infarction, pneumonia, blindness, and urinary tract infection. Additional risks include the risk of blood loss, infection, nerve damage, failure to heal, hardware problems and failure of the intervention to improve her symptoms.  With regard to blood loss, we use a medication called tranexamic acid.  Blood loss is typically around 25 cc during an SI fusion, and we have not had to transfuse anyone during or after this particular procedure.  To mitigate the risk of infection, we use IV antibiotics.  Deep infections after this surgery are rare.  To avoid  hardware problems and nerve damage, we use an intra-operative CT, which is like GPS for the spine. She understands the risks of the surgery and wishes to proceed.     Scribe Disclosure:   I, Dave Dean, am serving as a scribe to document services personally performed by Joel Hemphill MD at this visit, based upon the provider's statements to me. All documentation has been reviewed by the aforementioned provider prior to being entered into the official medical record.     Portions of this medical record were completed by a scribe. UPON MY REVIEW AND AUTHENTICATION BY ELECTRONIC SIGNATURE, this confirms (a) I performed the applicable clinical services, and (b) the record is accurate.      Joel Hemphill MD

## 2018-08-08 NOTE — LETTER
2018       RE: Rosemarie Zabala  [address] [place] [address]     Dear Colleague,    Thank you for referring your patient, Rosemarie Zabala, to the [facility]. Please see a copy of my visit note below.    [facility]  Orthopedics  Joel Hemphill MD  2018     Name: Rosemarie Zabala  MRN: [id number]  Age: 58 year old  : 1960  Referring provider: Referred Self     Chief Complaint: SI joint pain     History of Present Illness:   Rosemarie Zabala is a 58 year old female who presents today for discussion of SI joint fusion. I last evaluated the patient on 17, at which time we discussed her previous evaluations with Dr. Zabala and Dr. Verdugo at [facility], who both felt there her primary pain generator was her right SI joint. They explained that her insurance did not cover surgical intervention so she underwent 5 injections of PRP and amniotic fluid at unknown sites. However, her pain was significant worse after these injections.she has had multiple attempts at acupuncture, physical therapy, massage, and chiropractor visits. Each with no significant improvement of her pain. The pain was worse with any activity and she was getting by with 3 Tylenol at night and Ibuprofen throughout the day. There were no relieving factors for her. Her overall ADL's had decreased due to the pain and her quality of life was diminishing.     Per telephone note on 18, for authorization of her surgery she required XR of the Lumbar spine pelvis, hips and sacroiliac joints and either MRI or CT scan of the lumbar spine and CT of pelvis, hips, and sacroiliac joints. These were ordered. Today, Rosemarie is here for discussion of her imaging results. She voices that she has had three SI joint CT guided injections with her last on 18. She has been approved to have this every three months due to the severity of her pain. With each Injection she  "obtained about 7-8 days of relief and about 75-80% resolution of pain. She feels that her pain is debilitating and altering her normal life, endorsing that if only 50% of her pain was decreased this would drastically change things. Her current pain is still in the lower bilateral back and is constant with occasional numbness and tingling. She has continued to have decreased quality of life and is ill managed on Ibuprofen/ Tylenol.      MIKO: 42.5   Yhyoqo36: 24  Pain scale: 6        Physical Examination:  Height 1.695 m (5' 6.73\"), weight 89.5 kg (197 lb 4.8 oz), last menstrual period 12/14/2012,  Positive Rachael's point finger test on the right side. Midline sacrum tender but not primary pain complaint. Primary pain posterior sacroiliac spine tenderness. Facet loading positive but not primary pain.     Radiographs:   Radiographs of the spine - 2 views (08/08/2018)    Radiographs for Leg length evaluation (08/08/2018)    CT Lumbar spine without contrast (08/08/2018)    CT Pelvis without contrast (08/08/2018):  IMPRESSION:  1. Mild degenerative changes in the sacroiliac joints, without  definite erosive changes.  2. Degenerative disc disease in the visualized lower lumbar spine with  disc space narrowing and vacuum disc phenomenon at L5-S1. There is  also irregularity along the anterior superior aspect of the S1  vertebral body with osteophytic spurring.  8. Mild degenerative changes in the bilateral hips.   Per radiology report     I have independently reviewed the above imaging studies; the results were discussed with the patient.     Spine normal sagittal and coronal alignment. Pelvis slightly upsloped sacral ala bilateral (mildly dysmorphic). Facet arthropathy L4-5 and L5-S1 best seen on CT scan.    Assessment:   1. Sacroiliitis   2. Facet arthropathy by imaging but not symptomatic on exam.    Plan:   We discussed the patient's imaging today. Films reveal spinal alignment is okay though I do appreciate some sacral " dysmorphism. CT lumbar spine show facet joints looking good. There is facet arthropathy and degeneration at the L4-5 and L5-S1 level. There is a facet cyst at the L4-5 level. Overall, imaging suggests pain originating from the SI joint.     This patient meets the TRISH and ISASS criteria for minimally invasive SI fusion. These include 3/5 physical exam manuevers being positive (noted in previous visits), a positive response to injections with the most recent providing 75-80% relief, failure of extensive non-surgical management resulting in life debilitating changes, and CT imaging that rules out other possible pain generators (MRI of lumbar spine showing the sacrum, and plain films showing preserved joint space) as noted today and previous visits. There is no history of/or current pain behavior or pain disorder.     We discussed minimally invasive sacroiliac joint fusion in detail.  About 70-80% of patients who have an SI fusion experience roughly 50% improvement in symptoms.  About 30% of my patients have gone on to need the contralateral side fused as well.  We discussed the procedure in detail, and we reviewed the relevant anatomy using a model of the pelvis.  Weight bearing on the surgical side will be limited for three weeks after surgery, and she will need crutches during this time.  Physical therapy will be initiated after three weeks, and full recovery typically takes 6-12 weeks.      We reviewed the risks and benefits of the surgery in detail. The risks include those associated with anesthesia, including death, pulmonary embolism, DVT, stroke, myocardial infarction, pneumonia, blindness, and urinary tract infection. Additional risks include the risk of blood loss, infection, nerve damage, failure to heal, hardware problems and failure of the intervention to improve her symptoms.  With regard to blood loss, we use a medication called tranexamic acid.  Blood loss is typically around 25 cc during an SI fusion,  and we have not had to transfuse anyone during or after this particular procedure.  To mitigate the risk of infection, we use IV antibiotics.  Deep infections after this surgery are rare.  To avoid hardware problems and nerve damage, we use an intra-operative CT, which is like GPS for the spine. She understands the risks of the surgery and wishes to proceed.     Scribe Disclosure:   I, Dave Dean, am serving as a scribe to document services personally performed by Joel Hemphill MD at this visit, based upon the provider's statements to me. All documentation has been reviewed by the aforementioned provider prior to being entered into the official medical record.     Portions of this medical record were completed by a scribe. UPON MY REVIEW AND AUTHENTICATION BY ELECTRONIC SIGNATURE, this confirms (a) I performed the applicable clinical services, and (b) the record is accurate.    Again, thank you for allowing me to participate in the care of your patient.      Sincerely,    Joel Hemphill MD

## 2018-10-02 ENCOUNTER — TELEPHONE (OUTPATIENT)
Dept: ANESTHESIOLOGY | Facility: CLINIC | Age: 58
End: 2018-10-02

## 2018-10-02 NOTE — TELEPHONE ENCOUNTER
[facility] Call Center    Phone Message    May a detailed message be left on voicemail: yes    Reason for Call: Other: Pt is having pain, pt is wondering if she can be prescribed medication for pain, vasquez for night time, please call to discuss     Action Taken: Message routed to:  [facility] ([facility] Pain Clinic

## 2018-10-02 NOTE — TELEPHONE ENCOUNTER
LPN called and spoke to pt per their request.   Pt stated that they weren't looking for opioids, but they wanted medication suggestions either OTC or something similar, and specifically to help them sleep at night.     Pt was informed that they were going to need to come back to clinic as they were last seen by Dr. Richardson on 1/29/18. And it would not be safe to prescribe anything, because they will need a Clinical Evaluation. Pt has also had 2 procedures (Bilateral SI Joint Injections) with Dr. Connelly, 2/21/18, and 7/13/18. Because both Dr. Richardson and Dr. Connelly are out on Leave of Absences, Pt was assisted to schedule with the APRN, at Next available 10/23/18.    Pt was informed of the basics of OTC pain management, Alternating Tylenol and Advil, as well as utilizing Topicals and patches as directed on packaging. It was recommended that pt speak with their local pharmacist to see if they had specific injections.   Pt was also informed that they could follow up with their PCP if they needed to be seen sooner than 10/23/18. Pt could speak to them regarding their concerns with sleep, as well as see if they have suggestions of other non opioid pain medications that pt could take in the meantime.   Pt was agreeable and verbalized understanding.    Pt was added to the wait list incase there is another appointment that opens sooner.   Sonali Vega LPN

## 2018-10-11 ENCOUNTER — TELEPHONE (OUTPATIENT)
Dept: ORTHOPEDICS | Facility: CLINIC | Age: 58
End: 2018-10-11

## 2018-10-11 NOTE — TELEPHONE ENCOUNTER
Phoned patient to confirm message I received regarding rescheduling her surgery with Dr. Hemphill. Patient stated she needs to push it back for personal and financial reasons. She has been rescheduled for 12/3/18.

## 2018-11-28 ENCOUNTER — ANESTHESIA EVENT (OUTPATIENT)
Dept: SURGERY | Facility: CLINIC | Age: 58
End: 2018-11-28
Payer: COMMERCIAL

## 2018-11-28 ENCOUNTER — OFFICE VISIT (OUTPATIENT)
Dept: SURGERY | Facility: CLINIC | Age: 58
End: 2018-11-28
Payer: COMMERCIAL

## 2018-11-28 VITALS
DIASTOLIC BLOOD PRESSURE: 84 MMHG | WEIGHT: 200 LBS | HEIGHT: 68 IN | SYSTOLIC BLOOD PRESSURE: 170 MMHG | OXYGEN SATURATION: 97 % | TEMPERATURE: 97.3 F | HEART RATE: 85 BPM | BODY MASS INDEX: 30.31 KG/M2

## 2018-11-28 DIAGNOSIS — M46.1 SACROILIITIS (H): ICD-10-CM

## 2018-11-28 DIAGNOSIS — Z01.818 PRE-OP EXAMINATION: ICD-10-CM

## 2018-11-28 DIAGNOSIS — Z01.818 PRE-OP EXAMINATION: Primary | ICD-10-CM

## 2018-11-28 LAB
ALBUMIN UR-MCNC: NEGATIVE MG/DL
ANION GAP SERPL CALCULATED.3IONS-SCNC: 5 MMOL/L (ref 3–14)
APPEARANCE UR: CLEAR
BILIRUB UR QL STRIP: NEGATIVE
BUN SERPL-MCNC: 21 MG/DL (ref 7–30)
CALCIUM SERPL-MCNC: 9.6 MG/DL (ref 8.5–10.1)
CHLORIDE SERPL-SCNC: 103 MMOL/L (ref 94–109)
CO2 SERPL-SCNC: 30 MMOL/L (ref 20–32)
COLOR UR AUTO: YELLOW
CREAT SERPL-MCNC: 0.86 MG/DL (ref 0.52–1.04)
ERYTHROCYTE [DISTWIDTH] IN BLOOD BY AUTOMATED COUNT: 12.1 % (ref 10–15)
GFR SERPL CREATININE-BSD FRML MDRD: 68 ML/MIN/1.7M2
GLUCOSE SERPL-MCNC: 89 MG/DL (ref 70–99)
GLUCOSE UR STRIP-MCNC: NEGATIVE MG/DL
HBA1C MFR BLD: 5.9 % (ref 0–5.6)
HCT VFR BLD AUTO: 46.6 % (ref 35–47)
HGB BLD-MCNC: 14.7 G/DL (ref 11.7–15.7)
HGB UR QL STRIP: NEGATIVE
INR PPP: 0.95 (ref 0.86–1.14)
KETONES UR STRIP-MCNC: NEGATIVE MG/DL
LEUKOCYTE ESTERASE UR QL STRIP: NEGATIVE
MCH RBC QN AUTO: 30 PG (ref 26.5–33)
MCHC RBC AUTO-ENTMCNC: 31.5 G/DL (ref 31.5–36.5)
MCV RBC AUTO: 95 FL (ref 78–100)
MRSA DNA SPEC QL NAA+PROBE: NEGATIVE
NITRATE UR QL: NEGATIVE
PH UR STRIP: 6 PH (ref 5–7)
PLATELET # BLD AUTO: 289 10E9/L (ref 150–450)
POTASSIUM SERPL-SCNC: 4.7 MMOL/L (ref 3.4–5.3)
RBC # BLD AUTO: 4.9 10E12/L (ref 3.8–5.2)
SODIUM SERPL-SCNC: 138 MMOL/L (ref 133–144)
SOURCE: NORMAL
SP GR UR STRIP: 1.01 (ref 1–1.03)
SPECIMEN SOURCE: NORMAL
UROBILINOGEN UR STRIP-MCNC: 0 MG/DL (ref 0–2)
WBC # BLD AUTO: 6.1 10E9/L (ref 4–11)

## 2018-11-28 RX ORDER — LISINOPRIL 10 MG/1
10 TABLET ORAL EVERY MORNING
Refills: 1 | COMMUNITY
Start: 2018-09-07 | End: 2019-09-18

## 2018-11-28 NOTE — ANESTHESIA PREPROCEDURE EVALUATION
Anesthesia Evaluation     . Pt has had prior anesthetic. Type: General and MAC    History of anesthetic complications    Reports she had issues with hypotension with one of her surgeries as she had taken her BP medication.       ROS/MED HX    ENT/Pulmonary:     (+)JOHN risk factors hypertension, , . .    Neurologic:  - neg neurologic ROS     Cardiovascular:     (+) Dyslipidemia, hypertension----. : . . . :. . No previous cardiac testing       METS/Exercise Tolerance:  >4 METS   Hematologic:     (+) History of Transfusion no previous transfusion reaction -      Musculoskeletal: Comment: Sacroiliitis    DDD    ACL tear 16 years ago s/p repair         GI/Hepatic:     (+) cholecystitis/cholelithiasis (s/p cholecystectomy),       Renal/Genitourinary:     (+) Nephrolithiasis ,       Endo:     (+) Obesity (BMI >30), .      Psychiatric:     (+) psychiatric history anxiety and depression (Seeing a therapist. )      Infectious Disease:         Malignancy:      - no malignancy   Other:    (+) No chance of pregnancy C-spine cleared: Yes, H/O Chronic Pain,                   Physical Exam      Airway   Mallampati: II  TM distance: >3 FB  Neck ROM: full    Dental   Comment: stable    Cardiovascular   Rhythm and rate: regular and normal      Pulmonary    breath sounds clear to auscultation    Other findings: Allergies:   -- No Known Allergies       Current Facility-Administered Medications:      bupivacaine liposome (EXPAREL) 1.3 % LA inj susp 20 mL, 20 mL, Infiltration, DURING SURGERY, Coni Tristan PA-C     ceFAZolin (ANCEF) 1 g vial to attach to  ml bag for ADULT or 50 ml bag for PEDS, 1 g, Intravenous, See Admin Instructions, Coni Tristan PA-C     ceFAZolin (ANCEF) intermittent infusion 2 g in 100 mL dextrose PRE-MIX, 2 g, Intravenous, Pre-Op/Pre-procedure x 1 dose, Cnoi Tristan PA-C     lactated ringers infusion, , Intravenous, Continuous, Jair Naidu MD     lidocaine (LMX4)  cream, , Topical, Q1H PRN, Jair Naidu MD     lidocaine 1 % 1 mL, 1 mL, Other, Q1H PRN, Jair Naidu MD     sodium chloride (PF) 0.9% PF flush 3 mL, 3 mL, Intracatheter, Q1H PRN, Jair Naidu MD     sodium chloride (PF) 0.9% PF flush 3 mL, 3 mL, Intracatheter, Q8H, Jair Naidu MD    Prescriptions Prior to Admission:  Ascorbic Acid (VITAMIN C PO), Take by mouth daily, Disp: , Rfl: , Past Week at Unknown time  atorvastatin (LIPITOR) 20 MG tablet, TAKE ONE TABLET BY MOUTH ONE TIME DAILY (Patient taking differently: TAKE ONE TABLET 20MG BY MOUTH ONE TIME EVERY EVENING), Disp: 90 tablet, Rfl: 0, 12/2/2018 at 1900  calcium carbonate (OS-VALENTIN 500 MG [place]) 500 MG tablet, Take 1,000 mg by mouth daily, Disp: , Rfl: , Past Week at Unknown time  Cholecalciferol (VITAMIN D) 1000 UNITS capsule, Take 1 capsule by mouth daily., Disp: , Rfl: , Past Week at Unknown time  clonazePAM (KLONOPIN) 0.25 mg TABS half-tab, Take 0.25 mg by mouth every morning , Disp: , Rfl: , 12/3/2018 at 0415  Escitalopram Oxalate (LEXAPRO PO), Take 30 mg by mouth every morning , Disp: , Rfl: , 12/3/2018 at 0415  IBUPROFEN PO, Take 600 mg by mouth as needed , Disp: , Rfl: , Past Week  lisinopril (PRINIVIL/ZESTRIL) 10 MG tablet, Take 10 mg by mouth every morning, Disp: , Rfl: 1, 12/2/2018 at 0600  MELATONIN PO, Take 5 mg by mouth every evening, Disp: , Rfl: , 12/2/2018 at 2100  Omega-3 Fatty Acids (OMEGA 3) 1200 MG capsule, Take 1 capsule by mouth every morning , Disp: , Rfl: , Past Week at Unknown time             PAC Discussion and Assessment    ASA Classification: 3  Case is suitable for: [place]  Anesthetic techniques and relevant risks discussed: GA  Invasive monitoring and risk discussed:   Types:   Possibility and Risk of blood transfusion discussed:   NPO instructions given:   Additional anesthetic preparation and risks discussed:   Needs early admission to pre-op area:   Other:     PAC Resident/NP Anesthesia Assessment:  Rosemarie Zabala  is a 58 year old female scheduled Stealth Assisted Right Minimally Invasive Sacroiliac Joint Fusion with Dr. Hemphill  on 12/3/2018at [facility] under general anesthesia. Ms. Zabala has sacroiliitis and was last seen by Dr. Hemphill on 12/3/2018.  Given she has exhausted non-operative measures, he recommended the above procedure.      Ms. Zabala presents to PAC and denies any cardiopulmonary history or symptoms.  She has had multiple surgeries with one incident of hypotension when she had taken her BP mediation the morning of surgery.  She denies any PONV or difficulty waking. She continues to have SI joint pain.  She would like to proceed with above surgical intervention.    She has the following specific operative considerations:   - METS:  >4. RCRI : No serious cardiac risks.  0.4 % risk of major adverse cardiac event. No further cardiac evaluation needed per 2014 ACC/AHA guidelines for non-cardiac surgery.   - JOHN # of risks 3/8 = intermediate  - VTE risk:  0.26%  - Risk of PONV score = 3.  If > 2, anti-emetic intervention recommended.  - Post-op delirium risk: low    1.  Cardiology - denies cardiopulmonary history or symptom.       - HTN, recently switched from an ACE-I to a BB.  Given issues with hypotension, patient reports she will not take her BB DOS given her past experience.            - HLD,  Take statin as prescribed       - Does not take ASA     2.  Pulmonary - no smoking hx  3.  Hematology - h/o blood transfusion in past.    4.  GI - Obesity: Recommend careful positioning to prevent airway/ventilatory compromise, or tissue injury.    5.  Musculoskeletal - Sacroiliitis>>>above procedure planned.   6.  Neuro - RADHA, take escitalopram and clonazepam as prescribed. May benefit from early sedation.     - Anesthesia considerations:  Refer to PAC assessment in anesthesia records    Arrival time, NPO, shower and medication instructions provided by nursing staff today.  Preparing For Your Surgery handout given.   Patient was discussed with Dr Randall. I spent 30 minutes face to face with patient assessing, educating, counseling and/or coordinating care and examining the patient.  Of that 30 minutes, I spent greater than 50% of my time counseling and/or coordinating care.      Reviewed and Signed by PAC Mid-Level Provider/Resident  Mid-Level Provider/Resident: Mariposa MESSINA CNP  Date: 11/28/18  Time: 15:45    Attending Anesthesiologist Anesthesia Assessment:  58 year old for stealth assisted right minimally invasive SI joing fusion. Patient has no significant cardiac or pulmonary disease.    Patient/case discussed with YVON/resident; agree with above assessment. No need to see patient. Patient is appropriate for the planned procedure without further work-up or medical management.      Reviewed and Signed by PAC Anesthesiologist  Anesthesiologist: nate  Date: 11.28.2018  Time:   Pass/Fail: Pass  Disposition:     PAC Pharmacist Assessment:        Pharmacist:   Date:   Time:      Anesthesia Plan      History & Physical Review  History and physical reviewed and following examination, relevant changes include: also conducted a medical interview with Rosemarie    ASA Status:  3 .    NPO Status:  > 6 hours    Plan for General and ETT with Propofol and Intravenous induction. Maintenance will be Balanced.    PONV prophylaxis:  Ondansetron (or other 5HT-3) and Dexamethasone or Solumedrol  Rosemarie requests GA. Procedures and risks including those related to positioning explained. She understood and consented. Qs answered.       Postoperative Care  Postoperative pain management:  IV analgesics.      Consents  Anesthetic plan, risks, benefits and alternatives discussed with:  Patient.  Use of blood products discussed: No .   .                          .

## 2018-11-28 NOTE — MR AVS SNAPSHOT
After Visit Summary   2018    Rosemarie Zabala    MRN: [id number]           Patient Information     Date Of Birth          1960        Visit Information        Provider Department      2018 2:30 PM Mariposa Vazquez APRN CNP [facility]        Care Instructions    Preparing for Your Surgery      Name:  Rosemarie Zabala   MRN:  [id number]   :  1960   Today's Date:  2018     Arriving for surgery:  Surgery date:  12/3/18  Arrival time:  11:30AM  Please come to:     [facility] [address]  [address] [place]  [address]    - parking is available in front of [facility] from 5:15AM to 8:00PM. If you prefer, park your car in the Green Lot.    -Proceed to the 3rd floor, check in at the Adult Surgery Waiting Lounge. [phone]    If an escort is needed stop at the Information Desk in the lobby. Inform the information person that you are here for surgery. An escort to the Adult Surgery Waiting Lounge will be provided.        What can I eat or drink?  -  You may have solid food or milk products until 8 hours prior to your surgery. 12/3/18, 6AM  -  You may have water, apple juice or 7up/Sprite until 2 hours prior to your surgery. 12/3/18, 11:30AM    Which medicines can I take?  Stop Aspirin, Vitamin C and Fish Oil one week prior to surgery.  Hold Ibuprofen and Naproxen for 24 hours prior to surgery.   -  Do NOT take these medications in the morning, the day of surgery:    Calcium  Lisinopril   Vitamin D    -  Please take these medications the day of surgery:    Clonazepam  EscitalopraM(Lexapro)    How do I prepare myself?  -  Take two showers: one the night before surgery; and one the morning of surgery.         Use Scrubcare or Hibiclens to wash from neck down.  You may use your own shampoo and conditioner. No other hair products.   -  Do NOT use lotion, powder, deodorant, or antiperspirant the  day of your surgery.  -  Do NOT wear any makeup, fingernail polish or jewelry.  - Do not bring your own medications to the hospital, except for inhalers and eye drops.  -  Bring your ID and insurance card.    Questions or Concerns:  -If you have questions or concerns regarding the day of surgery, please call [phone].     -For questions after surgery please call your surgeons office.           AFTER YOUR SURGERY  Breathing exercises   Breathing exercises help you recover faster. Take deep breaths and let the air out slowly. This will:     Help you wake up after surgery.    Help prevent complications like pneumonia.  Preventing complications will help you go home sooner.   Nausea and vomiting   You may feel sick to your stomach after surgery; if so, let your nurse know.    Pain control:  After surgery, you may have pain. Our goal is to help you manage your pain. Pain medicine will help you feel comfortable enough to do activities that will help you heal.  These activities may include breathing exercises, walking and physical therapy.   To help your health care team treat your pain we will ask: 1) If you have pain  2) where it is located 3) describe your pain in your words  Methods of pain control include medications given by mouth, vein or by nerve block for some surgeries.  Sequential Compression Device (SCD) or Pneumo Boots:  You may need to wear SCD S on your legs or feet. These are wraps connected to a machine that pumps in air and releases it. The repeated pumping helps prevent blood clots from forming.           Follow-ups after your visit        Your next 10 appointments already scheduled     Dec 03, 2018   Procedure with Joel Hemphill MD   [facility] [place], Same Day Surgery (--)    [address] [facility] [address]   [phone]            Juan 10, 2019  2:45 PM CST   (Arrive by 2:15 PM)   RETURN SPINE with Joel Hemphill MD   [facility] ([facility])     "[address] [id number] [phone]            Feb 27, 2019  9:30 AM CST   (Arrive by 9:00 AM)   RETURN SPINE with Joel Hemphill MD   [facility] ([facility])    [address] [id number] [phone]              Who to contact     Please call your clinic at [phone] to:    Ask questions about your health    Make or cancel appointments    Discuss your medicines    Learn about your test results    Speak to your doctor            Additional Information About Your Visit        Back&harbMenu Information     [employer] gives you secure access to your electronic health record. If you see a primary care provider, you can also send messages to your care team and make appointments. If you have questions, please call your primary care clinic.  If you do not have a primary care provider, please call [phone] and they will assist you.      [employer] is an electronic gateway that provides easy, online access to your medical records. With [employer], you can request a clinic appointment, read your test results, renew a prescription or communicate with your care team.     To access your existing account, please contact your [facility] or call [phone] for assistance.        Care EveryWhere ID     This is your Care EveryWhere ID. This could be used by other organizations to access your [facility] medical records  [id number]        Your Vitals Were     Pulse Temperature Height Last Period Pulse Oximetry BMI (Body Mass Index)    85 97.3  F (36.3  C) (Oral) 1.727 m (5' 8\") 12/14/2012 97% 30.41 kg/m2       Blood Pressure from Last 3 Encounters:   11/28/18 170/84   07/13/18 (!) 163/93   02/21/18 138/82    Weight from Last 3 Encounters:   11/28/18 90.7 kg (200 lb)   08/08/18 89.5 kg (197 lb 4.8 oz)   02/21/18 88.5 kg (195 lb)              Today, you had the following     No orders found for " display         Today's Medication Changes          These changes are accurate as of 11/28/18  3:01 PM.  If you have any questions, ask your nurse or doctor.               These medicines have changed or have updated prescriptions.        Dose/Directions    atorvastatin 20 MG tablet   Commonly known as:  LIPITOR   This may have changed:  See the new instructions.   Used for:  Hyperlipidemia LDL goal <130        TAKE ONE TABLET BY MOUTH ONE TIME DAILY   Quantity:  90 tablet   Refills:  0                Primary Care Provider Office Phone # Fax #    Adeline Latasha Barron PA-C [phone]       [facility] [address] [address] [address]  [facility] [address]        Equal Access to Services     [facility]: Hadii roselyn sauceda hadduane Soisabel, waaxda luqadaha, qaybta kaalmada steffiyada, fariha hoff . So [facility] [phone].    ATENCIÓN: Si habla español, tiene a garcia disposición servicios gratuitos de asistencia lingüística. [facility] [phone].    We comply with applicable federal civil rights laws and [place] laws. We do not discriminate on the basis of race, color, national origin, age, disability, sex, sexual orientation, or gender identity.            Thank you!     Thank you for choosing [facility]  for your care. Our goal is always to provide you with excellent care. Hearing back from our patients is one way we can continue to improve our services. Please take a few minutes to complete the written survey that you may receive in the mail after your visit with us. Thank you!             Your Updated Medication List - Protect others around you: Learn how to safely use, store and throw away your medicines at www.disposemymeds.org.          This list is accurate as of 11/28/18  3:01 PM.  Always use your most recent med list.                   Brand Name Dispense Instructions for use Diagnosis    atorvastatin 20 MG tablet    LIPITOR    90 tablet    TAKE  ONE TABLET BY MOUTH ONE TIME DAILY    Hyperlipidemia LDL goal <130       calcium carbonate 500 MG tablet    OS-VALENTIN     Take 1,000 mg by mouth daily        clonazePAM 0.25 mg Tabs half-tab    klonoPIN     Take 0.25 mg by mouth every morning        IBUPROFEN PO      Take 600 mg by mouth as needed        LEXAPRO PO      Take 30 mg by mouth every morning        lisinopril 10 MG tablet    PRINIVIL/ZESTRIL     Take 10 mg by mouth every morning        MELATONIN PO      Take 5 mg by mouth every evening        omega-3 fatty acids 1200 MG capsule      Take 1 capsule by mouth every morning        VITAMIN C PO      Take by mouth daily        vitamin D 1000 units capsule      Take 1 capsule by mouth daily.

## 2018-11-28 NOTE — H&P
Pre-Operative H & P     CC:  Preoperative exam to assess for increased cardiopulmonary risk while undergoing surgery and anesthesia.    Date of Encounter: 11/28/2018  Primary Care Physician:  Adeline Barron  Reason for visit:   Sacroiliitis (H)    HPI  Rosemarie Zabala is a 58 year old female who presents for pre-operative H & P in preparation for Stealth Assisted Right Minimally Invasive Sacroiliac Joint Fusion with Dr. Hemphill  on 12/3/2018at [facility] under general anesthesia. Ms. Zabala has sacroiliitis and was last seen by Dr. Hemphill on 12/3/2018.  Given she has exhausted non-operative measures, he recommended the above procedure.      Ms. Zabala presents to PAC and denies any cardiopulmonary history or symptoms.  She has had multiple surgeries with one incident of hypotension when she had taken her BP mediation the morning of surgery.  She denies any PONV or difficulty waking. She continues to have SI joint pain.  She would like to proceed with above surgical intervention.   History is obtained from the patient and electronic health record.     Past Medical History  Past Medical History:   Diagnosis Date     Calculus of kidney 1994     Essential hypertension, benign     resolved 2014     Generalised anxiety disorder 2011     Genital herpes 2004     Left knee pain 2005    left knee cortisone injection     Mixed hyperlipidemia      Other and unspecified malignant neoplasm of skin of other and unspecified parts of face     left cheek, treated with Aldara cream       Past Surgical History  Past Surgical History:   Procedure Laterality Date     ARTHROSCOPY KNEE  2003    L Knee ACL repair     C REMOVAL OF KIDNEY STONE  1994    L Kidney stone     C/SECTION, LOW TRANSVERSE      x  2     CHOLECYSTECTOMY, LAPOROSCOPIC  2007    precancerous changes and gallstones     COLONOSCOPY  2012    NL. repeat 5 years + family history cancer     COLONOSCOPY N/A 5/23/2017    Procedure: COLONOSCOPY;  COLONOSCOPY;  Surgeon:  Declan Gonzales MD;  Location:  GI     COSMETIC ABDOMINOPLASTY  2013     HC REDUCTION OF LARGE BREAST       INJECT SACROILIAC JOINT Right 11/14/2017    Procedure: INJECT SACROILIAC JOINT;  Right Sacroiliac Joint Injection;  Surgeon: Christen Richardson MD;  Location: [facility] OR     INJECT SACROILIAC JOINT Bilateral 2/21/2018    Procedure: INJECT SACROILIAC JOINT;  SacroIliac Joint Injection;  Surgeon: Berny Connelly MD;  Location: [facility] OR     INJECT SACROILIAC JOINT Bilateral 7/13/2018    Procedure: INJECT SACROILIAC JOINT;  Bilateral Sacroiliac Joint Injection;  Surgeon: Berny Connelly MD;  Location: [facility] OR     SALPINGO OOPHORECTOMY,R/L/ARTIE  1981    right oophorectomy for benign tumor       Hx of Blood transfusions/reactions: yes without reaction     Hx of abnormal bleeding or anti-platelet use: denies    Menstrual history: Patient's last menstrual period was 12/14/2012.:    Steroid use in the last year: no oral steroids    Personal or FH with difficulty with Anesthesia:  Reports she had issues with hypotension with one of her surgeries as she had taken her BP medication.       Prior to Admission Medications  Current Outpatient Prescriptions   Medication Sig Dispense Refill     Ascorbic Acid (VITAMIN C PO) Take by mouth daily       atorvastatin (LIPITOR) 20 MG tablet TAKE ONE TABLET BY MOUTH ONE TIME DAILY (Patient taking differently: TAKE ONE TABLET 20MG BY MOUTH ONE TIME EVERY EVENING) 90 tablet 0     calcium carbonate (OS-VALENTIN 500 MG [place]. CA) 500 MG tablet Take 1,000 mg by mouth daily       Cholecalciferol (VITAMIN D) 1000 UNITS capsule Take 1 capsule by mouth daily.       clonazePAM (KLONOPIN) 0.25 mg TABS half-tab Take 0.25 mg by mouth every morning        Escitalopram Oxalate (LEXAPRO PO) Take 30 mg by mouth every morning        IBUPROFEN PO Take 600 mg by mouth as needed        lisinopril (PRINIVIL/ZESTRIL) 10 MG tablet Take 10 mg by mouth every morning  1     MELATONIN  PO Take 5 mg by mouth every evening       Omega-3 Fatty Acids (OMEGA 3) 1200 MG capsule Take 1 capsule by mouth every morning          Allergies  Allergies   Allergen Reactions     No Known Allergies        Social History  Social History     Social History     Marital status:      Spouse name: Joel     Number of children: 2     Years of education: 15     Occupational History     [employer]     Social History Main Topics     Smoking status: Never Smoker     Smokeless tobacco: Never Used     Alcohol use Yes      Comment: 3 drinks a week     Drug use: No     Sexual activity: Yes     Partners: Male      Comment:   had vasectomy     Other Topics Concern      Service No     Blood Transfusions No     Caffeine Concern No     2 cups coffee per day     Occupational Exposure No     Hobby Hazards No     Sleep Concern No     Stress Concern Yes     death of Mom      Weight Concern No     Special Diet No     Back Care No     Exercise Yes     5-7 times per week - walking     Bike Helmet Yes     Seat Belt Yes     Self-Exams Yes     Parent/Sibling W/ Cabg, Mi Or Angioplasty Before 65f 55m? No     Social History Narrative       Family History  Family History   Problem Relation Age of Onset     Depression Mother      Breast Cancer Mother      Gynecology Mother       of ovarian cancer     Hypertension Father      Cancer - colorectal Father      at age 64     Lipids Father      Dementia Father      mild     ROS/MED HX    ENT/Pulmonary:     (+)JOHN risk factors hypertension, , . .    Neurologic:  - neg neurologic ROS     Cardiovascular:     (+) Dyslipidemia, hypertension----. : . . . :. . No previous cardiac testing       METS/Exercise Tolerance:  >4 METS   Hematologic:     (+) History of Transfusion no previous transfusion reaction -      Musculoskeletal: Comment: Sacroiliitis    DDD    ACL tear 16 years ago s/p repair         GI/Hepatic:     (+) cholecystitis/cholelithiasis (s/p  "cholecystectomy),       Renal/Genitourinary:     (+) Nephrolithiasis ,       Endo:     (+) Obesity (BMI >30), .      Psychiatric:     (+) psychiatric history anxiety and depression (Seeing a therapist. )      Infectious Disease:         Malignancy:      - no malignancy   Other:    (+) No chance of pregnancy C-spine cleared: Yes, H/O Chronic Pain,         Temp: 97.3  F (36.3  C) Temp src: Oral BP: 170/84 Pulse: 85     SpO2: 97 %         200 lbs 0 oz  5' 8\"   Body mass index is 30.41 kg/(m^2).       Physical Exam  Constitutional: Awake, alert, cooperative, no apparent distress, and appears stated age.  Eyes: Pupils equal, round and reactive to light, extra ocular muscles intact, sclera clear, conjunctiva normal.  HENT: Normocephalic, oral pharynx with moist mucus membranes, dentition with age related changes. No goiter appreciated.   Respiratory: Clear to auscultation bilaterally, no crackles or wheezing.  Cardiovascular: Regular rate and rhythm, normal S1 and S2, and no murmur noted.  Carotids +2, no bruits. No edema. Palpable pulses to radial  DP and PT arteries.   GI: Normal bowel sounds, soft, non-distended, non-tender, no masses palpated, no hepatosplenomegaly.  Surgical scars:well healed  Lymph/Hematologic: No cervical lymphadenopathy and no supraclavicular lymphadenopathy.  Genitourinary:  na  Skin: Warm and dry.  No rashes at anticipated surgical site.   Musculoskeletal: Full ROM of neck. There is no redness, warmth, or swelling of the joints.   Neurologic: Awake, alert, oriented to name, place and time. Cranial nerves II-XII are grossly intact. Gait steady.  Neuropsychiatric: Calm, cooperative. Normal affect.     Labs: (personally reviewed)  Lab Results   Component Value Date    WBC 6.1 11/28/2018     Lab Results   Component Value Date    RBC 4.90 11/28/2018     Lab Results   Component Value Date    HGB 14.7 11/28/2018     Lab Results   Component Value Date    HCT 46.6 11/28/2018     Lab Results   Component " Value Date    MCV 95 11/28/2018     Lab Results   Component Value Date    MCH 30.0 11/28/2018     Lab Results   Component Value Date    MCHC 31.5 11/28/2018     Lab Results   Component Value Date    RDW 12.1 11/28/2018     Lab Results   Component Value Date     11/28/2018       Last Comprehensive Metabolic Panel:  Sodium   Date Value Ref Range Status   11/28/2018 138 133 - 144 mmol/L Final     Potassium   Date Value Ref Range Status   11/28/2018 4.7 3.4 - 5.3 mmol/L Final     Chloride   Date Value Ref Range Status   11/28/2018 103 94 - 109 mmol/L Final     Carbon Dioxide   Date Value Ref Range Status   11/28/2018 30 20 - 32 mmol/L Final     Anion Gap   Date Value Ref Range Status   11/28/2018 5 3 - 14 mmol/L Final     Glucose   Date Value Ref Range Status   11/28/2018 89 70 - 99 mg/dL Final     Urea Nitrogen   Date Value Ref Range Status   11/28/2018 21 7 - 30 mg/dL Final     Creatinine   Date Value Ref Range Status   11/28/2018 0.86 0.52 - 1.04 mg/dL Final     GFR Estimate   Date Value Ref Range Status   11/28/2018 68 >60 mL/min/1.7m2 Final     Comment:     Non  GFR Calc     Calcium   Date Value Ref Range Status   11/28/2018 9.6 8.5 - 10.1 mg/dL Final       ASSESSMENT and PLAN  Rosemarie Zabala is a 58 year old female scheduled to undergo  Stealth Assisted Right Minimally Invasive Sacroiliac Joint Fusion with Dr. Hemphill  on 12/3/2018at [facility] under general anesthesia. .     She has the following specific operative considerations:   - METS:  >4. RCRI : No serious cardiac risks.  0.4 % risk of major adverse cardiac event. No further cardiac evaluation needed per 2014 ACC/AHA guidelines for non-cardiac surgery.   - JOHN # of risks 3/8 = intermediate  - VTE risk:  0.26%  - Risk of PONV score = 3.  If > 2, anti-emetic intervention recommended.  - Post-op delirium risk: low    1.  Cardiology - denies cardiopulmonary history or symptom.       - HTN, recently switched from an ACE-I to a BB.   Given issues with hypotension with a previous surgery, patient reports she will not take her BB DOS.              - HLD,  Take statin as prescribed       - Does not take ASA     2.  Pulmonary - no smoking hx  3.  Hematology - h/o blood transfusion in past.  Will get T&S today.     4.  GI - Obesity: Recommend careful positioning to prevent airway/ventilatory compromise, or tissue injury.    5.  Musculoskeletal - Sacroiliitis>>>above procedure planned.   6.  Neuro - RADHA, take escitalopram and clonazepam as prescribed. May benefit from early sedation.     - Anesthesia considerations:  Refer to PAC assessment in anesthesia records    Arrival time, NPO, shower and medication instructions provided by nursing staff today.  Preparing For Your Surgery handout given.  Patient was discussed with Dr Randall. I spent 30 minutes face to face with patient assessing, educating, counseling and/or coordinating care and examining the patient.  Of that 30 minutes, I spent greater than 50% of my time counseling and/or coordinating care.      SIDDHARTH Candelario CNP  Preoperative Assessment Center  [facility]  Clinic and Surgery Center  Phone: [phone]  Fax: [phone]

## 2018-11-28 NOTE — PATIENT INSTRUCTIONS
Preparing for Your Surgery      Name:  Rosemarie Zabala   MRN:  [id number]   :  1960   Today's Date:  2018     Arriving for surgery:  Surgery date:  12/3/18  Arrival time:  11:30AM  Please come to:     [facility] [address]  [address] [place]  [address]    - parking is available in front of [facility] from 5:15AM to 8:00PM. If you prefer, park your car in the Green Lot.    -Proceed to the 3rd floor, check in at the Adult Surgery Waiting Lounge. [phone]    If an escort is needed stop at the Information Desk in the lobby. Inform the information person that you are here for surgery. An escort to the Adult Surgery Waiting Lounge will be provided.        What can I eat or drink?  -  You may have solid food or milk products until 8 hours prior to your surgery. 12/3/18, 6AM  -  You may have water, apple juice or 7up/Sprite until 2 hours prior to your surgery. 12/3/18, 11:30AM    Which medicines can I take?  Stop Aspirin, Vitamin C and Fish Oil one week prior to surgery.  Hold Ibuprofen and Naproxen for 24 hours prior to surgery.   -  Do NOT take these medications in the morning, the day of surgery:    Calcium  Lisinopril   Vitamin D    -  Please take these medications the day of surgery:    Clonazepam  EscitalopraM(Lexapro)    How do I prepare myself?  -  Take two showers: one the night before surgery; and one the morning of surgery.         Use Scrubcare or Hibiclens to wash from neck down.  You may use your own shampoo and conditioner. No other hair products.   -  Do NOT use lotion, powder, deodorant, or antiperspirant the day of your surgery.  -  Do NOT wear any makeup, fingernail polish or jewelry.  - Do not bring your own medications to the hospital, except for inhalers and eye drops.  -  Bring your ID and insurance card.    Questions or Concerns:  -If you have questions or concerns regarding the day of surgery, please call [phone].      -For questions after surgery please call your surgeons office.           AFTER YOUR SURGERY  Breathing exercises   Breathing exercises help you recover faster. Take deep breaths and let the air out slowly. This will:     Help you wake up after surgery.    Help prevent complications like pneumonia.  Preventing complications will help you go home sooner.   Nausea and vomiting   You may feel sick to your stomach after surgery; if so, let your nurse know.    Pain control:  After surgery, you may have pain. Our goal is to help you manage your pain. Pain medicine will help you feel comfortable enough to do activities that will help you heal.  These activities may include breathing exercises, walking and physical therapy.   To help your health care team treat your pain we will ask: 1) If you have pain  2) where it is located 3) describe your pain in your words  Methods of pain control include medications given by mouth, vein or by nerve block for some surgeries.  Sequential Compression Device (SCD) or Pneumo Boots:  You may need to wear SCD S on your legs or feet. These are wraps connected to a machine that pumps in air and releases it. The repeated pumping helps prevent blood clots from forming.

## 2018-11-29 LAB
ABO + RH BLD: NORMAL
ABO + RH BLD: NORMAL
BLD GP AB SCN SERPL QL: NORMAL
BLOOD BANK CMNT PATIENT-IMP: NORMAL
BLOOD BANK CMNT PATIENT-IMP: NORMAL
SPECIMEN EXP DATE BLD: NORMAL

## 2018-12-03 ENCOUNTER — HOSPITAL ENCOUNTER (OUTPATIENT)
Facility: CLINIC | Age: 58
Discharge: HOME | End: 2018-12-03
Attending: ORTHOPAEDIC SURGERY | Admitting: ORTHOPAEDIC SURGERY
Payer: COMMERCIAL

## 2018-12-03 ENCOUNTER — ANESTHESIA (OUTPATIENT)
Dept: SURGERY | Facility: CLINIC | Age: 58
End: 2018-12-03
Payer: COMMERCIAL

## 2018-12-03 ENCOUNTER — SURGERY (OUTPATIENT)
Age: 58
End: 2018-12-03

## 2018-12-03 ENCOUNTER — APPOINTMENT (OUTPATIENT)
Dept: GENERAL RADIOLOGY | Facility: CLINIC | Age: 58
End: 2018-12-03
Attending: ORTHOPAEDIC SURGERY
Payer: COMMERCIAL

## 2018-12-03 VITALS
DIASTOLIC BLOOD PRESSURE: 89 MMHG | HEIGHT: 67 IN | TEMPERATURE: 98 F | RESPIRATION RATE: 16 BRPM | BODY MASS INDEX: 31.14 KG/M2 | SYSTOLIC BLOOD PRESSURE: 132 MMHG | WEIGHT: 198.41 LBS | OXYGEN SATURATION: 94 %

## 2018-12-03 DIAGNOSIS — M53.3 SI (SACROILIAC) JOINT DYSFUNCTION: Primary | ICD-10-CM

## 2018-12-03 LAB — GLUCOSE BLDC GLUCOMTR-MCNC: 73 MG/DL (ref 70–99)

## 2018-12-03 PROCEDURE — 25000125 ZZHC RX 250: Performed by: NURSE ANESTHETIST, CERTIFIED REGISTERED

## 2018-12-03 PROCEDURE — 25000128 H RX IP 250 OP 636: Performed by: ANESTHESIOLOGY

## 2018-12-03 PROCEDURE — C1713 ANCHOR/SCREW BN/BN,TIS/BN: HCPCS | Performed by: ORTHOPAEDIC SURGERY

## 2018-12-03 PROCEDURE — 71000027 ZZH RECOVERY PHASE 2 EACH 15 MINS: Performed by: ORTHOPAEDIC SURGERY

## 2018-12-03 PROCEDURE — 27210995 ZZH RX 272: Performed by: ORTHOPAEDIC SURGERY

## 2018-12-03 PROCEDURE — 71000014 ZZH RECOVERY PHASE 1 LEVEL 2 FIRST HR: Performed by: ORTHOPAEDIC SURGERY

## 2018-12-03 PROCEDURE — 25000566 ZZH SEVOFLURANE, EA 15 MIN: Performed by: ORTHOPAEDIC SURGERY

## 2018-12-03 PROCEDURE — 25000128 H RX IP 250 OP 636: Performed by: NURSE ANESTHETIST, CERTIFIED REGISTERED

## 2018-12-03 PROCEDURE — 40000170 ZZH STATISTIC PRE-PROCEDURE ASSESSMENT II: Performed by: ORTHOPAEDIC SURGERY

## 2018-12-03 PROCEDURE — 36000078 ZZH SURGERY LEVEL 6 W FLUORO 1ST 30 MIN - UMMC: Performed by: ORTHOPAEDIC SURGERY

## 2018-12-03 PROCEDURE — 25000132 ZZH RX MED GY IP 250 OP 250 PS 637: Performed by: PHYSICIAN ASSISTANT

## 2018-12-03 PROCEDURE — 25000128 H RX IP 250 OP 636: Performed by: ORTHOPAEDIC SURGERY

## 2018-12-03 PROCEDURE — 37000008 ZZH ANESTHESIA TECHNICAL FEE, 1ST 30 MIN: Performed by: ORTHOPAEDIC SURGERY

## 2018-12-03 PROCEDURE — 37000009 ZZH ANESTHESIA TECHNICAL FEE, EACH ADDTL 15 MIN: Performed by: ORTHOPAEDIC SURGERY

## 2018-12-03 PROCEDURE — C9290 INJ, BUPIVACAINE LIPOSOME: HCPCS | Performed by: ORTHOPAEDIC SURGERY

## 2018-12-03 PROCEDURE — 25000301 ZZH OR RX SURGIFLO W/THROMBIN KIT 2ML 1991 OPNP: Performed by: ORTHOPAEDIC SURGERY

## 2018-12-03 PROCEDURE — 27210794 ZZH OR GENERAL SUPPLY STERILE: Performed by: ORTHOPAEDIC SURGERY

## 2018-12-03 PROCEDURE — 36000076 ZZH SURGERY LEVEL 6 EA 15 ADDTL MIN - UMMC: Performed by: ORTHOPAEDIC SURGERY

## 2018-12-03 PROCEDURE — 82962 GLUCOSE BLOOD TEST: CPT

## 2018-12-03 PROCEDURE — 25000125 ZZHC RX 250: Performed by: ORTHOPAEDIC SURGERY

## 2018-12-03 PROCEDURE — 40000278 XR SURGERY CARM FLUORO LESS THAN 5 MIN: Mod: TC

## 2018-12-03 PROCEDURE — 25000128 H RX IP 250 OP 636: Performed by: PHYSICIAN ASSISTANT

## 2018-12-03 DEVICE — IMPLANTABLE DEVICE: Type: IMPLANTABLE DEVICE | Site: BACK | Status: FUNCTIONAL

## 2018-12-03 RX ORDER — ACETAMINOPHEN 325 MG/1
325-650 TABLET ORAL EVERY 6 HOURS PRN
Qty: 50 TABLET | Refills: 0 | Status: SHIPPED | OUTPATIENT
Start: 2018-12-03

## 2018-12-03 RX ORDER — CEFAZOLIN SODIUM 1 G/3ML
1 INJECTION, POWDER, FOR SOLUTION INTRAMUSCULAR; INTRAVENOUS SEE ADMIN INSTRUCTIONS
Status: DISCONTINUED | OUTPATIENT
Start: 2018-12-03 | End: 2018-12-03 | Stop reason: HOSPADM

## 2018-12-03 RX ORDER — AMOXICILLIN 250 MG
1-2 CAPSULE ORAL 2 TIMES DAILY PRN
Qty: 30 TABLET | Refills: 0 | Status: SHIPPED | OUTPATIENT
Start: 2018-12-03

## 2018-12-03 RX ORDER — PROPOFOL 10 MG/ML
INJECTION, EMULSION INTRAVENOUS PRN
Status: DISCONTINUED | OUTPATIENT
Start: 2018-12-03 | End: 2018-12-03

## 2018-12-03 RX ORDER — ONDANSETRON 2 MG/ML
4 INJECTION INTRAMUSCULAR; INTRAVENOUS EVERY 30 MIN PRN
Status: DISCONTINUED | OUTPATIENT
Start: 2018-12-03 | End: 2018-12-03 | Stop reason: HOSPADM

## 2018-12-03 RX ORDER — ONDANSETRON 4 MG/1
4 TABLET, ORALLY DISINTEGRATING ORAL
Status: DISCONTINUED | OUTPATIENT
Start: 2018-12-03 | End: 2018-12-03 | Stop reason: HOSPADM

## 2018-12-03 RX ORDER — BUPIVACAINE HYDROCHLORIDE AND EPINEPHRINE 2.5; 5 MG/ML; UG/ML
INJECTION, SOLUTION INFILTRATION; PERINEURAL PRN
Status: DISCONTINUED | OUTPATIENT
Start: 2018-12-03 | End: 2018-12-03 | Stop reason: HOSPADM

## 2018-12-03 RX ORDER — FENTANYL CITRATE 50 UG/ML
25-50 INJECTION, SOLUTION INTRAMUSCULAR; INTRAVENOUS EVERY 5 MIN PRN
Status: DISCONTINUED | OUTPATIENT
Start: 2018-12-03 | End: 2018-12-03 | Stop reason: HOSPADM

## 2018-12-03 RX ORDER — EPHEDRINE SULFATE 50 MG/ML
INJECTION, SOLUTION INTRAMUSCULAR; INTRAVENOUS; SUBCUTANEOUS PRN
Status: DISCONTINUED | OUTPATIENT
Start: 2018-12-03 | End: 2018-12-03

## 2018-12-03 RX ORDER — LIDOCAINE 40 MG/G
CREAM TOPICAL
Status: DISCONTINUED | OUTPATIENT
Start: 2018-12-03 | End: 2018-12-03 | Stop reason: HOSPADM

## 2018-12-03 RX ORDER — NALOXONE HYDROCHLORIDE 0.4 MG/ML
.1-.4 INJECTION, SOLUTION INTRAMUSCULAR; INTRAVENOUS; SUBCUTANEOUS
Status: DISCONTINUED | OUTPATIENT
Start: 2018-12-03 | End: 2018-12-03 | Stop reason: HOSPADM

## 2018-12-03 RX ORDER — ACETAMINOPHEN 325 MG/1
650 TABLET ORAL
Status: DISCONTINUED | OUTPATIENT
Start: 2018-12-03 | End: 2018-12-03 | Stop reason: HOSPADM

## 2018-12-03 RX ORDER — CEFAZOLIN SODIUM 2 G/100ML
2 INJECTION, SOLUTION INTRAVENOUS
Status: COMPLETED | OUTPATIENT
Start: 2018-12-03 | End: 2018-12-03

## 2018-12-03 RX ORDER — HYDROXYZINE HYDROCHLORIDE 25 MG/1
25 TABLET, FILM COATED ORAL
Status: COMPLETED | OUTPATIENT
Start: 2018-12-03 | End: 2018-12-03

## 2018-12-03 RX ORDER — OXYCODONE HYDROCHLORIDE 5 MG/1
5 TABLET ORAL EVERY 6 HOURS PRN
Qty: 50 TABLET | Refills: 0 | Status: SHIPPED | OUTPATIENT
Start: 2018-12-03 | End: 2018-12-07

## 2018-12-03 RX ORDER — ONDANSETRON 4 MG/1
4 TABLET, ORALLY DISINTEGRATING ORAL EVERY 30 MIN PRN
Status: DISCONTINUED | OUTPATIENT
Start: 2018-12-03 | End: 2018-12-03 | Stop reason: HOSPADM

## 2018-12-03 RX ORDER — SODIUM CHLORIDE, SODIUM LACTATE, POTASSIUM CHLORIDE, CALCIUM CHLORIDE 600; 310; 30; 20 MG/100ML; MG/100ML; MG/100ML; MG/100ML
INJECTION, SOLUTION INTRAVENOUS CONTINUOUS PRN
Status: DISCONTINUED | OUTPATIENT
Start: 2018-12-03 | End: 2018-12-03

## 2018-12-03 RX ORDER — SODIUM CHLORIDE, SODIUM LACTATE, POTASSIUM CHLORIDE, CALCIUM CHLORIDE 600; 310; 30; 20 MG/100ML; MG/100ML; MG/100ML; MG/100ML
INJECTION, SOLUTION INTRAVENOUS CONTINUOUS
Status: DISCONTINUED | OUTPATIENT
Start: 2018-12-03 | End: 2018-12-03 | Stop reason: HOSPADM

## 2018-12-03 RX ORDER — OXYCODONE HYDROCHLORIDE 5 MG/1
5 TABLET ORAL
Status: COMPLETED | OUTPATIENT
Start: 2018-12-03 | End: 2018-12-03

## 2018-12-03 RX ORDER — ONDANSETRON 2 MG/ML
INJECTION INTRAMUSCULAR; INTRAVENOUS PRN
Status: DISCONTINUED | OUTPATIENT
Start: 2018-12-03 | End: 2018-12-03

## 2018-12-03 RX ORDER — FENTANYL CITRATE 50 UG/ML
INJECTION, SOLUTION INTRAMUSCULAR; INTRAVENOUS PRN
Status: DISCONTINUED | OUTPATIENT
Start: 2018-12-03 | End: 2018-12-03

## 2018-12-03 RX ORDER — HYDROMORPHONE HYDROCHLORIDE 1 MG/ML
.3-.5 INJECTION, SOLUTION INTRAMUSCULAR; INTRAVENOUS; SUBCUTANEOUS EVERY 10 MIN PRN
Status: DISCONTINUED | OUTPATIENT
Start: 2018-12-03 | End: 2018-12-03 | Stop reason: HOSPADM

## 2018-12-03 RX ORDER — MEPERIDINE HYDROCHLORIDE 25 MG/ML
12.5 INJECTION INTRAMUSCULAR; INTRAVENOUS; SUBCUTANEOUS
Status: DISCONTINUED | OUTPATIENT
Start: 2018-12-03 | End: 2018-12-03 | Stop reason: HOSPADM

## 2018-12-03 RX ADMIN — ROCURONIUM BROMIDE 50 MG: 10 INJECTION INTRAVENOUS at 07:37

## 2018-12-03 RX ADMIN — Medication 5 MG: at 07:56

## 2018-12-03 RX ADMIN — PHENYLEPHRINE HYDROCHLORIDE 50 MCG: 10 INJECTION, SOLUTION INTRAMUSCULAR; INTRAVENOUS; SUBCUTANEOUS at 08:09

## 2018-12-03 RX ADMIN — BUPIVACAINE 20 ML: 13.3 INJECTION, SUSPENSION, LIPOSOMAL INFILTRATION at 08:31

## 2018-12-03 RX ADMIN — Medication 5 MG: at 08:09

## 2018-12-03 RX ADMIN — SUGAMMADEX 200 MG: 100 INJECTION, SOLUTION INTRAVENOUS at 08:53

## 2018-12-03 RX ADMIN — HYDROXYZINE HYDROCHLORIDE 25 MG: 25 TABLET ORAL at 10:02

## 2018-12-03 RX ADMIN — ONDANSETRON 4 MG: 2 INJECTION INTRAMUSCULAR; INTRAVENOUS at 08:15

## 2018-12-03 RX ADMIN — PROPOFOL 20 MG: 10 INJECTION, EMULSION INTRAVENOUS at 08:44

## 2018-12-03 RX ADMIN — FENTANYL CITRATE 50 MCG: 50 INJECTION, SOLUTION INTRAMUSCULAR; INTRAVENOUS at 07:35

## 2018-12-03 RX ADMIN — FENTANYL CITRATE 100 MCG: 50 INJECTION, SOLUTION INTRAMUSCULAR; INTRAVENOUS at 08:48

## 2018-12-03 RX ADMIN — Medication 5 MG: at 08:20

## 2018-12-03 RX ADMIN — HYDROMORPHONE HYDROCHLORIDE 0.3 MG: 1 INJECTION, SOLUTION INTRAMUSCULAR; INTRAVENOUS; SUBCUTANEOUS at 10:04

## 2018-12-03 RX ADMIN — PHENYLEPHRINE HYDROCHLORIDE 50 MCG: 10 INJECTION, SOLUTION INTRAMUSCULAR; INTRAVENOUS; SUBCUTANEOUS at 07:56

## 2018-12-03 RX ADMIN — OXYCODONE HYDROCHLORIDE 5 MG: 5 TABLET ORAL at 10:18

## 2018-12-03 RX ADMIN — THROMBIN HUMAN 1 KIT: 2000 POWDER, FOR SOLUTION TOPICAL at 08:30

## 2018-12-03 RX ADMIN — HYDROMORPHONE HYDROCHLORIDE 0.5 MG: 1 INJECTION, SOLUTION INTRAMUSCULAR; INTRAVENOUS; SUBCUTANEOUS at 08:20

## 2018-12-03 RX ADMIN — PROPOFOL 20 MG: 10 INJECTION, EMULSION INTRAVENOUS at 08:53

## 2018-12-03 RX ADMIN — PROPOFOL 50 MG: 10 INJECTION, EMULSION INTRAVENOUS at 07:37

## 2018-12-03 RX ADMIN — MIDAZOLAM 2 MG: 1 INJECTION INTRAMUSCULAR; INTRAVENOUS at 07:28

## 2018-12-03 RX ADMIN — PROPOFOL 30 MG: 10 INJECTION, EMULSION INTRAVENOUS at 08:38

## 2018-12-03 RX ADMIN — BUPIVACAINE HYDROCHLORIDE AND EPINEPHRINE BITARTRATE 50 ML: 2.5; .005 INJECTION, SOLUTION INFILTRATION; PERINEURAL at 08:11

## 2018-12-03 RX ADMIN — CEFAZOLIN SODIUM 2 G: 2 INJECTION, SOLUTION INTRAVENOUS at 07:45

## 2018-12-03 RX ADMIN — PROPOFOL 70 MG: 10 INJECTION, EMULSION INTRAVENOUS at 07:35

## 2018-12-03 RX ADMIN — ACETAMINOPHEN 650 MG: 325 TABLET, FILM COATED ORAL at 10:02

## 2018-12-03 RX ADMIN — SODIUM CHLORIDE, POTASSIUM CHLORIDE, SODIUM LACTATE AND CALCIUM CHLORIDE: 600; 310; 30; 20 INJECTION, SOLUTION INTRAVENOUS at 09:05

## 2018-12-03 RX ADMIN — FENTANYL CITRATE 50 MCG: 50 INJECTION, SOLUTION INTRAMUSCULAR; INTRAVENOUS at 07:37

## 2018-12-03 RX ADMIN — SODIUM CHLORIDE, POTASSIUM CHLORIDE, SODIUM LACTATE AND CALCIUM CHLORIDE: 600; 310; 30; 20 INJECTION, SOLUTION INTRAVENOUS at 08:06

## 2018-12-03 NOTE — ANESTHESIA POSTPROCEDURE EVALUATION
Anesthesia POST Procedure Evaluation    Patient: Rosemarie Zabala   MRN:     [id number] Gender:   female   Age:    58 year old :      1960        Preoperative Diagnosis: Right Primary Sacroiliitis   Procedure(s):  Stealth Assisted Right Minimally Invasive Sacroiliac Joint Fusion   Postop Comments: none       Anesthesia Type:  Not documented    Reportable Event: NO     PAIN: Uncomplicated   Sign Out status: Comfortable, Well controlled pain     PONV: No PONV   Sign Out status:  No Nausea or Vomiting     Neuro/Psych: Uneventful perioperative course   Sign Out Status: Preoperative baseline; Age appropriate mentation     Airway/Resp.: Uneventful perioperative course   Sign Out Status: Non labored breathing, age appropriate RR; Resp. Status within EXPECTED Parameters     CV: Uneventful perioperative course   Sign Out status: Appropriate BP and perfusion indices; Appropriate HR/Rhythm     Disposition:   Sign Out in:  PACU  Disposition:  Phase II; Home  Recovery Course: Uneventful  Follow-Up: Not required     Comments/Narrative:  Awakening satisfactorily; strong; breathing well; oriented; comfortable; no complaints or complications;            Last Anesthesia Record Vitals:  CRNA VITALS  12/3/2018 0829 - 12/3/2018 0929      12/3/2018             Pulse: 95    SpO2: 96 %    Resp Rate (observed): 12          Last PACU/Preop Vitals:  Vitals:    18 0925 18 0930 18 0945   BP:  (!) 163/92 152/74   Resp: 14 11 12   Temp:  36.7  C (98  F)    SpO2: 91% 97% 97%         Electronically Signed By: Jair Naidu MD, December 3, 2018, 10:25 AM

## 2018-12-03 NOTE — IP AVS SNAPSHOT
MRN:[id number]                      After Visit Summary   12/3/2018    Rosemarie Zabala    MRN: [id number]           Thank you!     Thank you for choosing [facility] for your care. Our goal is always to provide you with excellent care. Hearing back from our patients is one way we can continue to improve our services. Please take a few minutes to complete the written survey that you may receive in the mail after you visit with us. Thank you!        Patient Information     Date Of Birth          1960        About your hospital stay     You were admitted on:  December 3, 2018 You last received care in the:  [facility]    You were discharged on:  December 3, 2018       Who to Call     For medical emergencies, please call 911.  For non-urgent questions about your medical care, please call your primary care provider or clinic, [phone]  For questions related to your surgery, please call your surgery clinic        Attending Provider     Provider Joel Decker MD Orthopedics       Primary Care Provider Office Phone # Fax #    Adeline Barron PA-C [phone]      After Care Instructions      Diet as Tolerated       Return to diet before surgery, unless instructed otherwise.            Discharge Instructions       Review outpatient procedure discharge instructions with patient as directed by Provider            Discharge Instructions - Lifting Limit (specify)       Lifting limit  10 pounds until seen at Post-op follow up appointment.            Ice to affected area       Ice pack to surgical site every 15 minutes per hour for 24 hours            No Aspirin, Ibuprofen or Naproxen products       for six weeks post surgery            Notify Provider       For signs and symptoms of infection: Fever greater than 101, redness, swelling, heat at site, drainage, pus.            Remove dressing on Post op Day 5           Return to clinic       Return to clinic in 6 weeks  for your first post operative appointment            Shower       Cover dressing if dressing is not going to be changed today            Weight bearing status - Toe touch           Wound care       Do not immerse wound in water for five weeks                  Your next 10 appointments already scheduled     Juan 10, 2019  2:45 PM CST   (Arrive by 2:15 PM)   RETURN SPINE with Joel Hemphill MD   [facility])    [address] [place] [id number]   [phone]            Feb 27, 2019  9:30 AM CST   (Arrive by 9:00 AM)   RETURN SPINE with Joel Hemphill MD   [facility] ([facility])    [address] [place] [id number]   [phone]              Further instructions from your care team       Postoperative Instructions- Sacroiliac joint fusion        Dr. Joel Hemphill  [address] [address]     You have had a sacroiliac joint fusion. You have an Aquacel dressing on the right hip.  This can get wet, so you can start to shower on the first day after surgery.  The dressing can be removed in five days, and the incision get wet at that time. You also have a small incision on the left side of the low back.  This dressing can be removed in 2-3 days, but it is not waterproof.  Please keep it dry until it is removed. Do not soak in the bath. No pools, hot tubs, or lakes for 6 weeks.     For postoperative pain control, I have prescribed a narcotic medication.  This should be taken for the first few weeks following surgery, but as soon as you are able, transition to an over-the-counter type medication such as Tylenol.  You may not take non-steroidal anti-inflammatory medications (eg: Advil, Ibuprofen, Aleve, others) for the first 3 months after surgery as it interferes with bone healing. While taking the narcotic medication, there are  several precautions that you must adhere to. These medications have numerous side effects including nausea, constipation, and drowsiness. If you experience nausea, this may be relieved by taking the medication with food or a light meal. To avoid constipation, please use an over-the-counter stool softener or drink lots of water and eat fruits and vegetables. Avoid operating heavy machinery or driving an automobile while on narcotic medications.      For the next 3 weeks, you will be toe touch weight bearing only on your affected side. You will use crutches to get around. Three weeks after your surgery will be your first follow up appointment. At that appointment, we will check your wound and have you return to physical therapy to have you begin weaning off walking with crutches. Avoid bending, lifting, and twisting. Your weight lifting restriction is 10 pounds until your first follow-up appointment.      When you get home, you may resume your normal diet as tolerated. You may not be very hungry but try to eat small healthy meals to help you heal. Remember to drink plenty of water/fluids to help keep you hydrated.    After surgery, you may have a sensitive scar.  When the dressing has been removed, you may massage the scar to decrease sensitivity and help break down scar tissue. Do this up to 4 times per day.    Please call or return if you experience the following:  Fevers (temperature greater than 100.4 degrees Fahrenheit)  Pain that is getting worse or does not respond to pain medications  Drainage from your wound  Increasing redness about the wound  Any other worrisome symptoms    You may reach the clinic by dialing [phone].  After hours, you may reach the resident on call by dialing [phone].      Same-Day Surgery   Adult Discharge Orders & Instructions     For 24 hours after surgery:  1. Get plenty of rest.  A responsible adult must stay with you for at least 24 hours after you leave the hospital.    2. Pain medication can slow your reflexes. Do not drive or use heavy equipment.  If you have weakness or tingling, don't drive or use heavy equipment until this feeling goes away.  3. Mixing alcohol and pain medication can cause dizziness and slow your breathing. It can even be fatal. Do not drink alcohol while taking pain medication.  4. Avoid strenuous or risky activities.  Ask for help when climbing stairs.   5. You may feel lightheaded.  If so, sit for a few minutes before standing.  Have someone help you get up.   6. If you have nausea (feel sick to your stomach), drink only clear liquids such as apple juice, ginger ale, broth or 7-Up.  Rest may also help.  Be sure to drink enough fluids.  Move to a regular diet as you feel able. Take pain medications with a small amount of solid food, such as toast or crackers, to avoid nausea.   7. A slight fever is normal. Call the doctor if your fever is over 100 F (37.7 C) (taken under the tongue) or lasts longer than 24 hours.  8. You may have a dry mouth, muscle aches, trouble sleeping or a sore throat.  These symptoms should go away after 24 hours.  9. Do not make important or legal decisions.   Pain Management:      1. Take pain medication (if prescribed) for pain as directed by your physician.        2. WARNING: If the pain medication you have been prescribed contains Tylenol  (acetaminophen), DO NOT take additional doses of Tylenol (acetaminophen).     Call your doctor for any of the followin.  Signs of infection (fever, growing tenderness at the surgery site, severe pain, a large amount of drainage or bleeding, foul-smelling drainage, redness, swelling).    2.  It has been over 8 to 10 hours since surgery and you are still not able to urinate (pee).    3.  Headache for over 24 hours.    4.  Numbness, tingling or weakness the day after surgery (if you had spinal anesthesia).  To contact a doctor, call _____________________________________ or:      [phone]  "and ask for the Resident On Call for:          __________________________________________ (answered 24 hours a day)      Emergency Department:  [facility]: [phone]  [facility]: [phone]               Rev. 10/2014       Additional Information     If you use hormonal birth control (such as the pill, patch, ring or implants): You'll need a second form of birth control for 7 days (condoms, a diaphragm or contraceptive foam). While in the hospital, you received a medicine called Bridion. Your normal birth control will not work as well for a week after taking this medicine.          Pending Results     No orders found from 12/1/2018 to 12/4/2018.            Admission Information     Date & Time Provider Department Dept. Phone    12/3/2018 Joel Hemphill MD [facility] [phone]      Your Vitals Were     Blood Pressure Temperature Respirations Height Weight Last Period    152/74 98  F (36.7  C) (Oral) 12 1.689 m (5' 6.5\") 90 kg (198 lb 6.6 oz) 12/14/2012    Pulse Oximetry BMI (Body Mass Index)                97% 31.55 kg/m2          MyChart Information     [employer] gives you secure access to your electronic health record. If you see a primary care provider, you can also send messages to your care team and make appointments. If you have questions, please call your primary care clinic.  If you do not have a primary care provider, please call [phone] and they will assist you.        Care EveryWhere ID     This is your Care EveryWhere ID. This could be used by other organizations to access your [facility] medical records  [id number]        Equal Access to Services     ELIZABETH CHRISTIAN AH: Lisa Issa, britni de los santos, fariha henao. So [facility] [phone].    ATENCIÓN: Si habla español, tiene a garcia disposición servicios gratuitos de asistencia lingüística. Llame al [phone].    We comply with " applicable federal civil rights laws and [place] laws. We do not discriminate on the basis of race, color, national origin, age, disability, sex, sexual orientation, or gender identity.               Review of your medicines      UNREVIEWED medicines. Ask your doctor about these medicines        Dose / Directions    atorvastatin 20 MG tablet   Commonly known as:  LIPITOR   Used for:  Hyperlipidemia LDL goal <130        TAKE ONE TABLET BY MOUTH ONE TIME DAILY   Quantity:  90 tablet   Refills:  0       calcium carbonate 500 MG tablet   Commonly known as:  OS-VALENTIN        Dose:  1000 mg   Take 1,000 mg by mouth daily   Refills:  0       clonazePAM 0.25 mg Tabs half-tab   Commonly known as:  klonoPIN        Dose:  0.25 mg   Take 0.25 mg by mouth every morning   Refills:  0       IBUPROFEN PO        Dose:  600 mg   Take 600 mg by mouth as needed   Refills:  0       LEXAPRO PO        Dose:  30 mg   Take 30 mg by mouth every morning   Refills:  0       lisinopril 10 MG tablet   Commonly known as:  PRINIVIL/ZESTRIL        Dose:  10 mg   Take 10 mg by mouth every morning   Refills:  1       MELATONIN PO        Dose:  5 mg   Take 5 mg by mouth every evening   Refills:  0       omega-3 fatty acids 1200 MG capsule        Dose:  1 capsule   Take 1 capsule by mouth every morning   Refills:  0       VITAMIN C PO        Take by mouth daily   Refills:  0       vitamin D 1000 units capsule        Dose:  1 capsule   Take 1 capsule by mouth daily.   Refills:  0         START taking        Dose / Directions    acetaminophen 325 MG tablet   Commonly known as:  TYLENOL   Used for:  SI (sacroiliac) joint dysfunction        Dose:  325-650 mg   Take 1-2 tablets (325-650 mg) by mouth every 6 hours as needed for mild pain   Quantity:  50 tablet   Refills:  0       oxyCODONE 5 MG tablet   Commonly known as:  ROXICODONE   Used for:  SI (sacroiliac) joint dysfunction        Dose:  5 mg   Take 1 tablet (5 mg) by mouth every 6 hours as needed for  "moderate to severe pain   Quantity:  50 tablet   Refills:  0       senna-docusate 8.6-50 MG tablet   Commonly known as:  SENOKOT-S/PERICOLACE   Used for:  SI (sacroiliac) joint dysfunction        Dose:  1-2 tablet   Take 1-2 tablets by mouth 2 times daily as needed for constipation (While on oral opioids.)   Quantity:  30 tablet   Refills:  0            Where to get your medicines      These medications were sent to [place], [place] - [address]  [address] [facility], [facility] [address]     Phone:  [phone]     acetaminophen 325 MG tablet    senna-docusate 8.6-50 MG tablet         Some of these will need a paper prescription and others can be bought over the counter. Ask your nurse if you have questions.     Bring a paper prescription for each of these medications     oxyCODONE 5 MG tablet                Protect others around you: Learn how to safely use, store and throw away your medicines at www.disposemymeds.org.        Information about your nerve block     Today you received a block to numb the nerves near your surgery site.    This is a block using local anesthetic or \"numbing\" medication injected around the nerves to anesthetize or \"numb\" the area supplied by those nerves. This block is injected into the muscle layer near your surgical site. The type of anesthesia (Exparel) your anesthesia team used to numb your abdomen may give you relief for up to 72 hours.     Diet: There are no diet restrictions, but you should drink plenty of fluids, unless you are on a fluid-restricted diet.     Activity: If your surgical site is an arm or leg you should be careful with your affected limb, since it is possible to injure your limb without being aware of it due to the numbing. Until full feeling returns, you should guard against bumping or hitting your limb, and avoid extreme hot or cold temperatures on the skin.    Pain Medication: As the block wears off, the feeling will return " as a tingling or prickly sensation near your surgical site. You will experience more discomfort from your incisions as the feeling returns. You may want to take a pain pill (a narcotic or Tylenol if this was prescribed by your surgeon) when you start to experience mild pain, before the pain becomes more severe. If your pain medications do not control your pain, you should notify your surgeon. If you are taking narcotics for pain management, do not drink alcohol, drive a car, or perform hazardous activities.  If you have questions or concerns you may call your surgeon at the number provided with your discharge instructions.     Call your surgeon if you experience blurry vision, ringing in the ears or metallic taste in your mouth.         Information about OPIOIDS     PRESCRIPTION OPIOIDS: WHAT YOU NEED TO KNOW   We gave you an opioid (narcotic) pain medicine. It is important to manage your pain, but opioids are not always the best choice. You should first try all the other options your care team gave you. Take this medicine for as short a time (and as few doses) as possible.    Some activities can increase your pain, such as bandage changes or therapy sessions. It may help to take your pain medicine 30 to 60 minutes before these activities. Reduce your stress by getting enough sleep, working on hobbies you enjoy and practicing relaxation or meditation. Talk to your care team about ways to manage your pain beyond prescription opioids.    These medicines have risks:    DO NOT drive when on new or higher doses of pain medicine. These medicines can affect your alertness and reaction times, and you could be arrested for driving under the influence (DUI). If you need to use opioids long-term, talk to your care team about driving.    DO NOT operate heavy machinery    DO NOT do any other dangerous activities while taking these medicines.    DO NOT drink any alcohol while taking these medicines.     If the opioid prescribed  includes acetaminophen, DO NOT take with any other medicines that contain acetaminophen. Read all labels carefully. Look for the word  acetaminophen  or  Tylenol.  Ask your pharmacist if you have questions or are unsure.    You can get addicted to pain medicines, especially if you have a history of addiction (chemical, alcohol or substance dependence). Talk to your care team about ways to reduce this risk.    All opioids tend to cause constipation. Drink plenty of water and eat foods that have a lot of fiber, such as fruits, vegetables, prune juice, apple juice and high-fiber cereal. Take a laxative (Miralax, milk of magnesia, Colace, Senna) if you don t move your bowels at least every other day. Other side effects include upset stomach, sleepiness, dizziness, throwing up, tolerance (needing more of the medicine to have the same effect), physical dependence and slowed breathing.    Store your pills in a secure place, locked if possible. We will not replace any lost or stolen medicine. If you don t finish your medicine, please throw away (dispose) as directed by your pharmacist. The [place] Pollution Control Agency has more information about safe disposal: https://www.pca.[facility].[place].us/living-green/managing-unwanted-medications             Medication List: This is a list of all your medications and when to take them. Check marks below indicate your daily home schedule. Keep this list as a reference.      Medications           Morning Afternoon Evening Bedtime As Needed    acetaminophen 325 MG tablet   Commonly known as:  TYLENOL   Take 1-2 tablets (325-650 mg) by mouth every 6 hours as needed for mild pain   Last time this was given:  650 mg on 12/3/2018 10:02 AM                                atorvastatin 20 MG tablet   Commonly known as:  LIPITOR   TAKE ONE TABLET BY MOUTH ONE TIME DAILY                                calcium carbonate 500 MG tablet   Commonly known as:  OS-VALENTIN   Take 1,000 mg by mouth daily                                 clonazePAM 0.25 mg Tabs half-tab   Commonly known as:  klonoPIN   Take 0.25 mg by mouth every morning                                IBUPROFEN PO   Take 600 mg by mouth as needed                                LEXAPRO PO   Take 30 mg by mouth every morning                                lisinopril 10 MG tablet   Commonly known as:  PRINIVIL/ZESTRIL   Take 10 mg by mouth every morning                                MELATONIN PO   Take 5 mg by mouth every evening                                omega-3 fatty acids 1200 MG capsule   Take 1 capsule by mouth every morning                                oxyCODONE 5 MG tablet   Commonly known as:  ROXICODONE   Take 1 tablet (5 mg) by mouth every 6 hours as needed for moderate to severe pain   Last time this was given:  5 mg on 12/3/2018 10:18 AM                                senna-docusate 8.6-50 MG tablet   Commonly known as:  SENOKOT-S/PERICOLACE   Take 1-2 tablets by mouth 2 times daily as needed for constipation (While on oral opioids.)                                VITAMIN C PO   Take by mouth daily                                vitamin D 1000 units capsule   Take 1 capsule by mouth daily.

## 2018-12-03 NOTE — IP AVS SNAPSHOT
MAIN OR    [address]    [facility] [address]    Phone:  [phone]                                       After Visit Summary   12/3/2018    Rosemarie Zabala    MRN: [id number]           After Visit Summary Signature Page     I have received my discharge instructions, and my questions have been answered. I have discussed any challenges I see with this plan with the nurse or doctor.    ..........................................................................................................................................  Patient/Patient Representative Signature      ..........................................................................................................................................  Patient Representative Print Name and Relationship to Patient    ..................................................               ................................................  Date                                   Time    ..........................................................................................................................................  Reviewed by Signature/Title    ...................................................              ..............................................  Date                                               Time          22EPIC Rev 08/18

## 2018-12-03 NOTE — ANESTHESIA CARE TRANSFER NOTE
Patient: Rosemarie Zabala    Procedure(s):  Stealth Assisted Right Minimally Invasive Sacroiliac Joint Fusion    Diagnosis: Right Primary Sacroiliitis  Diagnosis Additional Information: No value filed.    Anesthesia Type:   General, ETT     Note:  Airway :Face Mask  Patient transferred to:PACU  Comments: RR 10, T 36.7    Handoff Report: Identifed the Patient, Identified the Reponsible Provider, Reviewed the pertinent medical history, Discussed the surgical course, Reviewed Intra-OP anesthesia mangement and issues during anesthesia, Set expectations for post-procedure period and Allowed opportunity for questions and acknowledgement of understanding      Vitals: (Last set prior to Anesthesia Care Transfer)    CRNA VITALS  12/3/2018 0829 - 12/3/2018 0903      12/3/2018             Pulse: 95    SpO2: 96 %    Resp Rate (observed): 12                Electronically Signed By: SIDDHARTH Murray CRNA  December 3, 2018  9:03 AM

## 2018-12-03 NOTE — BRIEF OP NOTE
Orthopaedic Brief Operative Note 12/3/2018 8:54 AM    Patient: Rosemarie Zabala  MRN: [id number]       Pre-operative diagnosis: Right Primary Sacroiliitis   Post-operative diagnosis: Same   Procedure: Procedure(s):  Stealth Assisted Right Minimally Invasive Sacroiliac Joint Fusion     Surgeon:  Co-surgeon:  Resident: Joel Hemphill MD  N/A  None available   Assistant(s): Coni Tristan PA-C     Anesthesia: General   Estimated blood loss: 5 ml   Total IV fluids: (See anesthesia record)   Total urine output: (See anesthesia record)   Blood transfusion No transfusion was given during surgery   Drains: None   Specimens: None   Implants: See dictated operative report for full details   Grafts: N/A   Findings: See dictated operative report for full details   Complications: None   Disposition: Stable and extubated to PACU     Plan:    Pain: Scheduled Tylenol, oxycodone PRN  Activity: Touch down weight bearing on RLE. No lifting >10 lbs. No excessive twisting or bending.  Wound care: Dressing change as needed per Ortho  Diet: ADAT  DVT ppx: Mechanical only  Radiographs: N/A  Disposition: Home today if discharge criteria is met         Coni Tristan PA-C  Pager: [phone]    If no answer or after 4pm, please page the on call ortho resident.

## 2018-12-03 NOTE — OP NOTE
Procedure Date: 12/03/2018      PREOPERATIVE DIAGNOSIS:  Right sacroiliitis.      POSTOPERATIVE DIAGNOSIS:  Right Sacroiliitis.      PROCEDURE:  Right minimally invasive sacroiliac joint fusion.      SURGEON:  Joel Hemphill Jr., MD      ASSISTANT:  ILAN Dumont, who was needed for assistance with retraction, implant placement along with closure.  No qualified resident was available.      INDICATION FOR OPERATION:  The patient is an adult female with a history of right sacroiliitis who has undergone a prolonged nonoperative course.  She has had multiple PRP in amniotic fluid injections, which have failed to relieve her symptoms.  Until recently, her insurance was denying coverage for this procedure; subsequently this changed.  Of note is her MIKO was 42.  Her physical exam was positive for provocation of her SI joints.  She had imaging guided injections which gave her transient relief.  She met the RACHEAL and ASAS criteria and it was her desire to proceed.      DESCRIPTION OF TECHNICAL PROCEDURES USED:  The OR team was briefed about the plan.  The patient was brought to the operating room and underwent the induction of adequate general anesthesia, was turned in position prone on a 4-poster frame.  She was prepared and draped in the usual sterile fashion.  A timeout was done confirming the site and type of surgery.  Marcaine 0.25% with epinephrine was injected over the left posterosuperior iliac spine and a short percutaneous reference pin was seated into place.  The O-arm was then used to obtain intraoperative 3D images and then transferred to the Stealth image-guided workstation.  This was now used to perform image-guided surgery by marking the proposed entry points for the 3 implants, then injecting the proposed skin incision along with the proposed tracks of the implants.  Starting at the cephalad portion of S1, a navigated drill guide was used to place a K-wire in the cephalad portion of S1.  The soft tissue  was dilated.  This was then drilled, broached and a 7.0 x 55 mm iFuse 3D implant was seated into place.  This was checked under fluoroscopy.  Next, attention was addressed to the caudal aspect of S1 where in a similar fashion, a navigated K-wire was delivered.  Soft tissue dilated, drilled, broached and a 7.0 x 50 mm iFuse 3D implant seated into place.  Finally, attention was addressed to the caudal aspect of the incision and at the S2 segment a navigated K-wire was drilled, checked under 2D fluoroscopy, soft tissue dilated, drilled, broached and a 7.0 x 50 mm iFuse 3D implant seated into place.  Wound was irrigated out.  Marcaine 0.25% with epinephrine, a total of 50 mL, had been utilized, Exparel 20 mL was used in the trudy-incisional region and the wound was closed.  Dermabond was used on the skin and Aquacel dressing applied.  Estimated blood loss for this case was 5 mL.  Intraoperative 2D and 3D images had confirmed optimal positioning of the implants.         NUPUR ROSEN JR, MD             D: 2018   T: 2018   MT: STEVENSON      Name:     ALTAF HI   MRN:      [id number]        Account:        [id number]   :      1960           Procedure Date: 2018      Document: [id number]       cc: Shoshana Verdugo MD

## 2018-12-03 NOTE — DISCHARGE INSTRUCTIONS
Postoperative Instructions- Sacroiliac joint fusion        Dr. Joel Hemphill  [address] [address]     You have had a sacroiliac joint fusion. You have an Aquacel dressing on the right hip.  This can get wet, so you can start to shower on the first day after surgery.  The dressing can be removed in five days, and the incision get wet at that time. You also have a small incision on the left side of the low back.  This dressing can be removed in 2-3 days, but it is not waterproof.  Please keep it dry until it is removed. Do not soak in the bath. No pools, hot tubs, or lakes for 6 weeks.     For postoperative pain control, I have prescribed a narcotic medication.  This should be taken for the first few weeks following surgery, but as soon as you are able, transition to an over-the-counter type medication such as Tylenol.  You may not take non-steroidal anti-inflammatory medications (eg: Advil, Ibuprofen, Aleve, others) for the first 3 months after surgery as it interferes with bone healing. While taking the narcotic medication, there are several precautions that you must adhere to. These medications have numerous side effects including nausea, constipation, and drowsiness. If you experience nausea, this may be relieved by taking the medication with food or a light meal. To avoid constipation, please use an over-the-counter stool softener or drink lots of water and eat fruits and vegetables. Avoid operating heavy machinery or driving an automobile while on narcotic medications.      For the next 3 weeks, you will be toe touch weight bearing only on your affected side. You will use crutches to get around. Three weeks after your surgery will be your first follow up appointment. At that appointment, we will check your wound and have you return to physical therapy to have you begin weaning off walking with crutches. Avoid bending, lifting, and twisting. Your weight  lifting restriction is 10 pounds until your first follow-up appointment.      When you get home, you may resume your normal diet as tolerated. You may not be very hungry but try to eat small healthy meals to help you heal. Remember to drink plenty of water/fluids to help keep you hydrated.    After surgery, you may have a sensitive scar.  When the dressing has been removed, you may massage the scar to decrease sensitivity and help break down scar tissue. Do this up to 4 times per day.    Please call or return if you experience the following:  Fevers (temperature greater than 100.4 degrees Fahrenheit)  Pain that is getting worse or does not respond to pain medications  Drainage from your wound  Increasing redness about the wound  Any other worrisome symptoms    You may reach the clinic by dialing [phone].  After hours, you may reach the resident on call by dialing [phone].      Same-Day Surgery   Adult Discharge Orders & Instructions     For 24 hours after surgery:  1. Get plenty of rest.  A responsible adult must stay with you for at least 24 hours after you leave the hospital.   2. Pain medication can slow your reflexes. Do not drive or use heavy equipment.  If you have weakness or tingling, don't drive or use heavy equipment until this feeling goes away.  3. Mixing alcohol and pain medication can cause dizziness and slow your breathing. It can even be fatal. Do not drink alcohol while taking pain medication.  4. Avoid strenuous or risky activities.  Ask for help when climbing stairs.   5. You may feel lightheaded.  If so, sit for a few minutes before standing.  Have someone help you get up.   6. If you have nausea (feel sick to your stomach), drink only clear liquids such as apple juice, ginger ale, broth or 7-Up.  Rest may also help.  Be sure to drink enough fluids.  Move to a regular diet as you feel able. Take pain medications with a small amount of solid food, such as toast or crackers, to avoid  nausea.   7. A slight fever is normal. Call the doctor if your fever is over 100 F (37.7 C) (taken under the tongue) or lasts longer than 24 hours.  8. You may have a dry mouth, muscle aches, trouble sleeping or a sore throat.  These symptoms should go away after 24 hours.  9. Do not make important or legal decisions.   Pain Management:      1. Take pain medication (if prescribed) for pain as directed by your physician.        2. WARNING: If the pain medication you have been prescribed contains Tylenol  (acetaminophen), DO NOT take additional doses of Tylenol (acetaminophen).     Call your doctor for any of the followin.  Signs of infection (fever, growing tenderness at the surgery site, severe pain, a large amount of drainage or bleeding, foul-smelling drainage, redness, swelling).    2.  It has been over 8 to 10 hours since surgery and you are still not able to urinate (pee).    3.  Headache for over 24 hours.    4.  Numbness, tingling or weakness the day after surgery (if you had spinal anesthesia).  To contact a doctor, call _____________________________________ or:      [phone] and ask for the Resident On Call for:          __________________________________________ (answered 24 hours a day)      Emergency Department:  [facility]: [phone]  [facility]: [phone]               Rev. 10/2014

## 2018-12-07 ENCOUNTER — TELEPHONE (OUTPATIENT)
Dept: ORTHOPEDICS | Facility: CLINIC | Age: 58
End: 2018-12-07

## 2018-12-07 DIAGNOSIS — M53.3 SI (SACROILIAC) JOINT DYSFUNCTION: ICD-10-CM

## 2018-12-07 RX ORDER — OXYCODONE HYDROCHLORIDE 5 MG/1
TABLET ORAL
Qty: 90 TABLET | Refills: 0 | Status: SHIPPED | OUTPATIENT
Start: 2018-12-07 | End: 2018-12-31

## 2018-12-07 RX ORDER — HYDROXYZINE HYDROCHLORIDE 25 MG/1
TABLET, FILM COATED ORAL
Qty: 90 TABLET | Refills: 1
Start: 2018-12-07 | End: 2019-09-18

## 2018-12-07 NOTE — TELEPHONE ENCOUNTER
[facility] Call Center    Phone Message    May a detailed message be left on voicemail: yes    Reason for Call: Other: Pt called and stated that she called and spoke with the on call provider last night because she was not getting any pain relief with her current dose of Oxycodone. They changed her script to 10mg every 4 to 5 hours. Pt wanted to update Jaymie JOSUE and Dr. Hemphill on that     Action Taken: Message routed to:  [facility]     See phone message.  I call ed pt back.  DOS 12-3-18 Right SI Joint Fusion sent home same day with #50 Oxycodone 5mg 1 Q6H.  Pt. States after talking to resident on call last night & increasing dose to 2 Oxycodone =10mg Q4H she did get relief of pain.  Was taking ES Tylenol 500mg Q4H with stomach upset so advised her to try Tylenol 650mg Q6H as ordered & added Atarax.  Has#32 Oxycodone 5mg left.  Refilled Oxycodone  5mg & increased SIG. To 1-2 Q4-6H prn pain &  will  .  Called Atarax to pharmacy.  RTC POP appt. 1-10-19. Call back prn. Pt agreed.  S.O./Jaymie Jean RN.

## 2018-12-20 ENCOUNTER — TRANSFERRED RECORDS (OUTPATIENT)
Dept: HEALTH INFORMATION MANAGEMENT | Facility: CLINIC | Age: 58
End: 2018-12-20

## 2018-12-31 ENCOUNTER — TELEPHONE (OUTPATIENT)
Dept: ORTHOPEDICS | Facility: CLINIC | Age: 58
End: 2018-12-31

## 2018-12-31 DIAGNOSIS — M53.3 SI (SACROILIAC) JOINT DYSFUNCTION: ICD-10-CM

## 2018-12-31 RX ORDER — OXYCODONE HYDROCHLORIDE 5 MG/1
TABLET ORAL
Qty: 90 TABLET | Refills: 0 | Status: SHIPPED | OUTPATIENT
Start: 2018-12-31 | End: 2019-02-14

## 2018-12-31 NOTE — TELEPHONE ENCOUNTER
DOS 12-3-18 Right SI Joint Fusion.    Pt. Called On Call Resident over weekend with questions about increased pain &  her pain meds.  So I called her back today MOn Dec 31.  She rates pain 8 in Right buttocks area & LBP. Doing PT 2X/week as ordered & using Ice & heat which helps.  Having Normal BM's with stool softener.  Taking Atarax less than what is ordered & has 1 refill left.  Taking Tylenol less than what is ordered due to upset stomach when she takes it.  states she stopped taking the Oxycodone days ago because she thought she was only supposed to take the Atarax & now has increased pain.  Has #20 left of Oxycodone 5mg.   Told pt. To restart the Oxycodone & mailed her refill script.  She knows no mail tomorrow due to Holiday.  Continue Atarax & Tylenol as ordered & be sure to take the Tylenol with food.  RTC POP appt. Next week 1-10-19.  Call back prn.  She agreed.  S.O./Emy Jean RN.

## 2019-01-03 ENCOUNTER — TRANSFERRED RECORDS (OUTPATIENT)
Dept: HEALTH INFORMATION MANAGEMENT | Facility: CLINIC | Age: 59
End: 2019-01-03

## 2019-01-07 DIAGNOSIS — M53.3 SI (SACROILIAC) JOINT DYSFUNCTION: Primary | ICD-10-CM

## 2019-01-07 ASSESSMENT — ENCOUNTER SYMPTOMS
VOMITING: 0
FATIGUE: 1
HALLUCINATIONS: 0
NECK PAIN: 0
BLOATING: 1
MYALGIAS: 1
DIARRHEA: 0
JAUNDICE: 0
NIGHT SWEATS: 1
DECREASED CONCENTRATION: 0
POLYDIPSIA: 0
WEIGHT GAIN: 0
BACK PAIN: 1
INCREASED ENERGY: 0
FEVER: 0
NAUSEA: 0
DECREASED APPETITE: 0
RECTAL PAIN: 0
JOINT SWELLING: 0
STIFFNESS: 1
DEPRESSION: 1
BOWEL INCONTINENCE: 0
CHILLS: 0
PANIC: 0
MUSCLE CRAMPS: 0
NERVOUS/ANXIOUS: 1
BLOOD IN STOOL: 0
INSOMNIA: 0
WEIGHT LOSS: 0
POLYPHAGIA: 0
CONSTIPATION: 1
HEARTBURN: 0
ABDOMINAL PAIN: 0
MUSCLE WEAKNESS: 0
ALTERED TEMPERATURE REGULATION: 0
ARTHRALGIAS: 1

## 2019-01-08 ENCOUNTER — OFFICE VISIT (OUTPATIENT)
Dept: ORTHOPEDICS | Facility: CLINIC | Age: 59
End: 2019-01-08
Payer: COMMERCIAL

## 2019-01-08 ENCOUNTER — ANCILLARY PROCEDURE (OUTPATIENT)
Dept: GENERAL RADIOLOGY | Facility: CLINIC | Age: 59
End: 2019-01-08
Payer: COMMERCIAL

## 2019-01-08 VITALS — WEIGHT: 198 LBS | BODY MASS INDEX: 31.08 KG/M2 | HEIGHT: 67 IN

## 2019-01-08 DIAGNOSIS — M53.3 SI (SACROILIAC) PAIN: Primary | ICD-10-CM

## 2019-01-08 DIAGNOSIS — M53.3 SI (SACROILIAC) JOINT DYSFUNCTION: ICD-10-CM

## 2019-01-08 ASSESSMENT — MIFFLIN-ST. JEOR: SCORE: 1507.81

## 2019-01-08 NOTE — PROGRESS NOTES
REASON FOR VISIT: S/P right sacroiliac joint fusion    REFERRING PHYSICIAN: Referred Self     PRIMARY CARE PHYSICIAN: Adeline Barron    HISTORY OF PRESENT ILLNESS: Rosemarie Zabala is a 58 year old female who returns to clinic today for her first post operative appointment following right sacroiliac joint fusion, which was performed by Dr. Hemphill on 12/3/18.  Overall, she thinks the surgery helped.  However, she has chronic midline low back pain that has not improved since surgery.  She is doing physical therapy but notes a slow progression because her pain got out of control.  It is now better with the use of oxycodone in the morning and evening as well as Atarax three times per day.  She is not taking any NSAIDs per post op guidelines.     Oswestry score: 57.4%  [id number]: 24  Pain scale: 4.5 back, 6.5 right leg, 7.5 left leg    PHYSICAL EXAM:   Constitutional - Patient is healthy, well-nourished and appears stated age.    BMI = 31.48    Respiratory - Patient is breathing normally and in no respiratory distress.   Skin - No suspicious rashes or lesions.  Surgical incisions are healing well without erythema, warmth or drainage.   Psychiatric - Normal mood and affect.   Cardiovascular - Extremities are warm and well perfused.   Eyes - Visual acuity is normal to the written word.   ENT - Hearing intact to the spoken word.   GI - No abdominal distention.   Musculoskeletal - Non-antalgic gait without use of assistive devices.  Positive Trendelenburg on the right with single leg stance.            Lumbar Spine:    Appearance - Normal    Palpation - Deferred    ROM - Deferred    Motor -        LOWER EXTREMITY Left Right   Hip flexion 5/5 5/5   Knee flexion 5/5 5/5   Knee extension 5/5 5/5   Ankle dorsiflexion 5/5 5/5   Ankle plantarflexion 5/5 5/5   Great toe extension 5/5 5/5      Neurologic - Sensation intact to light touch bilaterally in the lower extremities.      IMAGING: Radiographs of the pelvis were  obtained today.  They show implants that remain intact without evidence of loosening or migration. See full radiologic report in chart.    CLINICAL ASSESSMENT:   S/P right sacroiliac joint fusion    DISCUSSION/PLAN:   Rosemarie is a 58 year old female who returned to clinic today for her first post operative appointment following right sacroiliac joint fusion, which was performed by Dr. Hemphill on 12/3/18.  She is progressing as expected.  Her incisions are well healed.  Radiographs were obtained today, and they show implants that remain in place.  I encouraged her to continue physical therapy to work on strengthening her gluteal muscles.  She should return to clinic again in six weeks for repeat xrays and further evaluation.     All questions and concerns were answered to the patient's apparent satisfaction before leaving the clinic.     Thank you for allowing Dr. Hemphill and I to participate in the care of Rosemarie Zabaal.    Respectfully,  Coni Tristan PA-C    CC  Copy to patient    [address] [place] [address]  Answers for HPI/ROS submitted by the patient on 1/7/2019   General Symptoms: Yes  Skin Symptoms: No  HENT Symptoms: No  EYE SYMPTOMS: No  HEART SYMPTOMS: No  LUNG SYMPTOMS: No  INTESTINAL SYMPTOMS: Yes  URINARY SYMPTOMS: No  GYNECOLOGIC SYMPTOMS: No  BREAST SYMPTOMS: No  SKELETAL SYMPTOMS: Yes  BLOOD SYMPTOMS: No  NERVOUS SYSTEM SYMPTOMS: No  MENTAL HEALTH SYMPTOMS: Yes  Fever: No  Loss of appetite: No  Weight loss: No  Weight gain: No  Fatigue: Yes  Night sweats: Yes  Chills: No  Increased stress: Yes  Excessive hunger: No  Excessive thirst: No  Feeling hot or cold when others believe the temperature is normal: No  Loss of height: No  Post-operative complications: No  Surgical site pain: Yes  Hallucinations: No  Change in or Loss of Energy: No  Hyperactivity: No  Confusion: No  Heart burn or indigestion: No  Nausea: No  Vomiting: No  Abdominal pain: No  Bloating: Yes  Constipation:  Yes  Diarrhea: No  Blood in stool: No  Black stools: No  Rectal or Anal pain: No  Fecal incontinence: No  Yellowing of skin or eyes: No  Vomit with blood: No  Change in stools: No  Back pain: Yes  Muscle aches: Yes  Neck pain: No  Swollen joints: No  Joint pain: Yes  Bone pain: No  Muscle cramps: No  Muscle weakness: No  Joint stiffness: Yes  Bone fracture: No  Nervous or Anxious: Yes  Depression: Yes  Trouble sleeping: No  Trouble thinking or concentrating: No  Mood changes: Yes  Panic attacks: No

## 2019-01-08 NOTE — NURSING NOTE
"Reason For Visit:   Chief Complaint   Patient presents with     Surgical Followup     DOS 12/3/18 S/P R SI joint fusion     Primary MD: Shoshana Bhatti     ?  No  Occupation [employer]   Currently working? Yes.  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 Work related tasks, arthritis in L4-5, Previous injections in Facet Joints   Date of surgery: 12/3/18  Type of surgery: PROCEDURE:  Right minimally invasive sacroiliac joint fusion.   Smoker: No      Ht 1.689 m (5' 6.5\")   Wt 89.8 kg (198 lb)   LMP 12/14/2012   BMI 31.48 kg/m      Pain Assessment  Patient Currently in Pain: Yes  0-10 Pain Scale: 5  Primary Pain Location: Back(low back)  Pain Descriptors: Discomfort    Oswestry (MIKO) Questionnaire    OSWESTRY DISABILITY INDEX 1/7/2019   Count 8   Sum 23   Oswestry Score (%) 57.5   Some recent data might be hidden          Visual Analog Pain Scale  Back Pain Scale 0-10: 4.5  Right leg pain: 6.5  Left leg pain: 7.5    Promis 10 Assessment    PROMIS 10 1/7/2019   In general, would you say your health is: Good   In general, would you say your quality of life is: Very good   In general, how would you rate your physical health? Good   In general, how would you rate your mental health, including your mood and your ability to think? Good   In general, how would you rate your satisfaction with your social activities and relationships? Fair   In general, please rate how well you carry out your usual social activities and roles Fair   To what extent are you able to carry out your everyday physical activities such as walking, climbing stairs, carrying groceries, or moving a chair? A little   How often have you been bothered by emotional problems such as feeling anxious, depressed or irritable? Sometimes   How would you rate your fatigue on average? Mild   How would you rate your pain on average?   0 = No Pain  to  10 = Worst Imaginable Pain 5   Global " Physical Health Score : Raw Score -   Global Mental Health Score : Raw Score -   Total (Physical + Mental Health Score) -   In general, would you say your health is: 3   In general, would you say your quality of life is: 4   In general, how would you rate your physical health? 3   In general, how would you rate your mental health, including your mood and your ability to think? 3   In general, how would you rate your satisfaction with your social activities and relationships? 2   In general, please rate how well you carry out your usual social activities and roles. (This includes activities at home, at work and in your community, and responsibilities as a parent, child, spouse, employee, friend, etc.) 2   To what extent are you able to carry out your everyday physical activities such as walking, climbing stairs, carrying groceries, or moving a chair? 2   In the past 7 days, how often have you been bothered by emotional problems such as feeling anxious, depressed, or irritable? 3   In the past 7 days, how would you rate your fatigue on average? 2   In the past 7 days, how would you rate your pain on average, where 0 means no pain, and 10 means worst imaginable pain? 5   Global Mental Health Score 12   Global Physical Health Score 12   PROMIS TOTAL - SUBSCORES 24   Some recent data might be hidden                Tracey Cobb LPN

## 2019-01-08 NOTE — LETTER
1/8/2019       RE: Rosemarie Zabala  [address] [place] [address]     Dear Colleague,    Thank you for referring your patient, Rosemarie Zabala, to [facility]. Please see a copy of my visit note below.    REASON FOR VISIT: S/P right sacroiliac joint fusion    REFERRING PHYSICIAN: Referred Self     PRIMARY CARE PHYSICIAN: Adeline Barron    HISTORY OF PRESENT ILLNESS: Rosemarie Zabala is a 58 year old female who returns to clinic today for her first post operative appointment following right sacroiliac joint fusion, which was performed by Dr. Hemphill on 12/3/18.  Overall, she thinks the surgery helped.  However, she has chronic midline low back pain that has not improved since surgery.  She is doing physical therapy but notes a slow progression because her pain got out of control.  It is now better with the use of oxycodone in the morning and evening as well as Atarax three times per day.  She is not taking any NSAIDs per post op guidelines.     Oswestry score: 57.4%  Ckrdfd89: 24  Pain scale: 4.5 back, 6.5 right leg, 7.5 left leg    PHYSICAL EXAM:   Constitutional - Patient is healthy, well-nourished and appears stated age.    BMI = 31.48    Respiratory - Patient is breathing normally and in no respiratory distress.   Skin - No suspicious rashes or lesions.  Surgical incisions are healing well without erythema, warmth or drainage.   Psychiatric - Normal mood and affect.   Cardiovascular - Extremities are warm and well perfused.   Eyes - Visual acuity is normal to the written word.   ENT - Hearing intact to the spoken word.   GI - No abdominal distention.   Musculoskeletal - Non-antalgic gait without use of assistive devices.  Positive Trendelenburg on the right with single leg stance.            Lumbar Spine:    Appearance - Normal    Palpation - Deferred    ROM - Deferred    Motor -        LOWER EXTREMITY Left Right   Hip  flexion 5/5 5/5   Knee flexion 5/5 5/5   Knee extension 5/5 5/5   Ankle dorsiflexion 5/5 5/5   Ankle plantarflexion 5/5 5/5   Great toe extension 5/5 5/5      Neurologic - Sensation intact to light touch bilaterally in the lower extremities.      IMAGING: Radiographs of the pelvis were obtained today.  They show implants that remain intact without evidence of loosening or migration. See full radiologic report in chart.    CLINICAL ASSESSMENT:   S/P right sacroiliac joint fusion    DISCUSSION/PLAN:   Rosemarie is a 58 year old female who returned to clinic today for her first post operative appointment following right sacroiliac joint fusion, which was performed by Dr. Hemphill on 12/3/18.  She is progressing as expected.  Her incisions are well healed.  Radiographs were obtained today, and they show implants that remain in place.  I encouraged her to continue physical therapy to work on strengthening her gluteal muscles.  She should return to clinic again in six weeks for repeat xrays and further evaluation.     All questions and concerns were answered to the patient's apparent satisfaction before leaving the clinic.     Thank you for allowing Dr. Hemphill and I to participate in the care of Rosemarie Zabala.    Respectfully,  Coni Tristan PA-C    CC  Copy to patient    [address] [place] [address]

## 2019-01-28 ENCOUNTER — TELEPHONE (OUTPATIENT)
Dept: ORTHOPEDICS | Facility: CLINIC | Age: 59
End: 2019-01-28

## 2019-01-28 NOTE — TELEPHONE ENCOUNTER
[facility] Call Center    Phone Message    May a detailed message be left on voicemail: yes    Reason for Call: Other: Pt had questions about pain on low back; DOS 12/3/2018, Dr. Hemphill Pt. Pt was transferred to speak with Jaymie RUTLEDGE      Action Taken: Message routed to:  [facility] Clinic     I called pt. Back yesterday 1-28-19.  Surgery 12-3-18 was Right SI Joint Fusion seen in clinic 1-8-19 by ILAN Newberry with pelvis XR-see dictation.   Now pt. C/O LBP at L4-5 area started 3-4 weeks ago. States not same pain as she had before Dec surgery.    states HX. Lumbar issues including 2 cysts & her Physical therapist told her to call us.  I got all imaging from [facility].  Last Lumbar MRI & CT 2017.    Made New appt. With .  Reviewed imaging with  today 1-29-19 who stated does not need New Lumbar MRI before appt.  Call back prn.  Pt. Agreed.  V.O.R.B.DR. Marshall/Jaymie Jean RN.

## 2019-02-11 ASSESSMENT — ENCOUNTER SYMPTOMS
ABDOMINAL PAIN: 0
ALTERED TEMPERATURE REGULATION: 0
STIFFNESS: 1
BOWEL INCONTINENCE: 0
SLEEP DISTURBANCES DUE TO BREATHING: 0
LEG PAIN: 1
INSOMNIA: 1
LIGHT-HEADEDNESS: 0
PANIC: 1
DECREASED APPETITE: 0
HEARTBURN: 0
ORTHOPNEA: 0
BACK PAIN: 1
JOINT SWELLING: 0
HALLUCINATIONS: 0
MYALGIAS: 0
WEIGHT GAIN: 0
HYPOTENSION: 0
FATIGUE: 1
FEVER: 0
NECK PAIN: 0
INCREASED ENERGY: 1
JAUNDICE: 0
HYPERTENSION: 0
MUSCLE WEAKNESS: 0
EXERCISE INTOLERANCE: 1
PALPITATIONS: 0
BLOOD IN STOOL: 0
POLYPHAGIA: 0
NAUSEA: 1
CHILLS: 0
DIARRHEA: 0
MUSCLE CRAMPS: 0
POLYDIPSIA: 0
DEPRESSION: 1
CONSTIPATION: 1
BLOATING: 1
NIGHT SWEATS: 1
RECTAL PAIN: 0
SYNCOPE: 0
ARTHRALGIAS: 1
DECREASED CONCENTRATION: 0
VOMITING: 0
WEIGHT LOSS: 0
NERVOUS/ANXIOUS: 1

## 2019-02-11 NOTE — PROGRESS NOTES
REASON FOR CONSULTATION: RECHECK (Dr. Hemphill pt. ) and Surgical Followup (DOS 12/3/18 S/P Stealth Assisted Right Minimally Invasive Sacroiliac Joint Fusion)     REFERRING PHYSICIAN: Referred Self   PCP:Adeline Barron    History of Present Illness:  Rosemarie Zabala is a 58 year old female with a PMH significant for anxiety, depression. Last evaluated by Coni Tristan PA-C on 1/8/2019. She is 2.5 months s/p Right sacroiliac joint fusion (12/3/2018) by Dr. Hemphill. She reported that overall her surgery helped however, her chronic midline low back pain persisted unaltered. She has been doing PT, but her progression has been slow 2/2 her 'out of control' low back pain. Her low back pain is currently managed with Oxycodone BID. She is not taking any NSAIDs per post op guidelines.     Patient reports a 6-year history of low back pain with associated radicular symptoms.  She reports that following her Right SI fusion, she had decreased her level of activity and therefore, was not having much low back pain.  However, approximately 4-5 weeks ago she began to increase her level of activity and developed severe progressive re-worsening of low back pain.  She reports predominantly left lower extremity radicular symptoms that are diffuse in nature and do not appear to be following any particular dermatome.    She reports that she had been seen by another spine surgeon (Amy - Dr. Shirley) prior to working with Dr. Hemphill and was instructed that she may need a lumbar fusion.  She reports that her and Dr. Hemphill decided to pursue her right SI joint fusion prior to discussing the potential lumbar fusion.  Today she is interested in discussing management of her current pain and the potential need for future surgical intervention.    Back 80%, Leg 20%,  Left > Right  Worse: Movement, remaining stationary; always hurts   Better: Narcotics    Previous treatment:   1. Physical therapy   2. Medications: oxycodone, melatonin,  atarax   3. Reports a history of epidural steroid injections both to lumbar and SI joints which provided her with ~14 days of significant relief    Oswestry (MIKO) Questionnaire    OSWESTRY DISABILITY INDEX 2/11/2019   Count 8   Sum 19   Oswestry Score (%) 47.5   Some recent data might be hidden        ROS:  A 12-point review of systems was completed and is negative except for otherwise noted above in the history of present illness.    Med Hx:  Past Medical History:   Diagnosis Date     Calculus of kidney 1994     Essential hypertension, benign     resolved 2014     Generalised anxiety disorder 2011     Genital herpes 2004     Left knee pain 2005    left knee cortisone injection     Mixed hyperlipidemia      Other and unspecified malignant neoplasm of skin of other and unspecified parts of face     left cheek, treated with Aldara cream       Surg Hx:  Past Surgical History:   Procedure Laterality Date     ARTHROSCOPY KNEE  2003    L Knee ACL repair     C REMOVAL OF KIDNEY STONE  1994    L Kidney stone     C/SECTION, LOW TRANSVERSE      x  2     CHOLECYSTECTOMY, LAPOROSCOPIC  2007    precancerous changes and gallstones     COLONOSCOPY  2012    NL. repeat 5 years + family history cancer     COLONOSCOPY N/A 5/23/2017    Procedure: COLONOSCOPY;  COLONOSCOPY;  Surgeon: Declan Gonzales MD;  Location:  GI     COSMETIC ABDOMINOPLASTY  2013     HC REDUCTION OF LARGE BREAST       INJECT SACROILIAC JOINT Right 11/14/2017    Procedure: INJECT SACROILIAC JOINT;  Right Sacroiliac Joint Injection;  Surgeon: Christen Richardson MD;  Location:  OR     INJECT SACROILIAC JOINT Bilateral 2/21/2018    Procedure: INJECT SACROILIAC JOINT;  SacroIliac Joint Injection;  Surgeon: Berny Connelly MD;  Location: [facility] OR     INJECT SACROILIAC JOINT Bilateral 7/13/2018    Procedure: INJECT SACROILIAC JOINT;  Bilateral Sacroiliac Joint Injection;  Surgeon: Berny Connelly MD;  Location:  OR     OPTICAL  "TRACKING SYSTEM FUSION SACRAL ILIAC Right 12/3/2018    Procedure: Stealth Assisted Right Minimally Invasive Sacroiliac Joint Fusion;  Surgeon: Joel Hemphill MD;  Location: [facility] OR     SALPINGO OOPHORECTOMY,R/L/ARTIE  1981    right oophorectomy for benign tumor       Allergies:  Allergies   Allergen Reactions     No Known Allergies        Meds:  Current Outpatient Medications   Medication     acetaminophen (TYLENOL) 325 MG tablet     Ascorbic Acid (VITAMIN C PO)     atorvastatin (LIPITOR) 20 MG tablet     calcium carbonate (OS-VALENTIN 500 MG [place]. CA) 500 MG tablet     Cholecalciferol (VITAMIN D) 1000 UNITS capsule     clonazePAM (KLONOPIN) 0.25 mg TABS half-tab     Escitalopram Oxalate (LEXAPRO PO)     hydrOXYzine (ATARAX) 25 MG tablet     IBUPROFEN PO     lisinopril (PRINIVIL/ZESTRIL) 10 MG tablet     MELATONIN PO     Omega-3 Fatty Acids (OMEGA 3) 1200 MG capsule     senna-docusate (SENOKOT-S/PERICOLACE) 8.6-50 MG tablet     diazepam (VALIUM) 5 MG tablet     oxyCODONE (ROXICODONE) 5 MG tablet     No current facility-administered medications for this visit.        Fam Hx:  Family History   Problem Relation Age of Onset     Depression Mother      Breast Cancer Mother      Gynecology Mother          of ovarian cancer     Hypertension Father      Cancer - colorectal Father         at age 64     Lipids Father      Dementia Father         mild       P/S Hx:  Social History     Tobacco Use     Smoking status: Never Smoker     Smokeless tobacco: Never Used   Substance Use Topics     Alcohol use: Yes     Comment: 3 drinks a week         Physical Exam:  Very pleasant, healthy appearing, alert, oriented x 3, cooperative.  Appropriate mood and affect.  Not in cardiorespiratory distress.  Ht 1.689 m (5' 6.5\")   Wt 89.8 kg (198 lb)   LMP 2012   BMI 31.48 kg/m    Normal upright posture.    Normal gait without assistive device.  No antalgia / imbalance.  Back: no deformity, no skin lesions.  Localizes pain at mid-low " back  Tenderness: (+) midline, (+) paraspinal, (-) R and (+) L PSIS.  ROM:  Pain with extension/flexion     Neuro Exam:  Motor: 5/5 strength for all muscle groups in RLE's. 4/5 strength for Left hip flexion and knee extension/flexion 2/2 pain, 5/5 Left EHL/FHL, TA/Gastrocnemius   Sensory:  Intact to light touch in both LE's.   Reflexes:  Knee 2+ bilat.  Ankle 2+ bilat.  (-) Babinski, (-) clonus.    Lower Extremity:  Equal leg lengths, full pulses, (-) atrophy / asymmetry.  Full painless passive knee and ankle motion.  Straight leg raise: (-) right, (+) left.  Hip impingement: (-) right, (-) left.  REBEKAH/Elmo's: (-) right, (+) left.      Sacroiliac Joint Tests:      TEST RIGHT LEFT   PSIS tenderness - -   Rachael finger sign - -   REBEKAH/Elmo - +   Thigh thrust - +           Gapping -   Compression -   Sacral thrust -   Gaenslen's +           Imaging:  See below    Impression:   - Grade 1 spondylolisthesis     Plan:   Discussed patient's prior imaging which demonstrated Grade 1 spondylolisthesis. Unfortunately, her MRI is >2 years old.   Discussed patient's complex pain and the process to determine the primary source of her pain.   Given patient's worsening pain, will pursue work up in preparation for L4-S1 fusion   Order MRI Lumbar spine  Order L5/S1 trans foraminal ASHLEY today  Patient will call to order repeat L5/S1 trans foraminal ASHLEY prior to patient's May vacation with her daughter (she is headed to [place]) in May.  Repeat flexion/extension XR on way out of clinic    Anne Richey MD  Orthopaedic Surgery, PGY-1    Attestation:  I (Dr. Collins Marshall - Spine Surgeon) have personally evaluated patient with PGY-1 Rossi, and agree with findings and plan outlined in the note.  I discussed at length with the patient/family, explained the nature of spinal condition, and formulated workup and/or treatment plan together.  All questions were answered to the best of my ability and to patient's apparent  satisfaction.    58/f, 1st visit with me.  She is 2.5 mos s/p R MIS SIJ fusion by Dr. Hemphill, who is currently on medical leave; thus she is seeing me instead.  Accompanied by .    Says SIJ fusion helped with R buttock pain.  However, still complaining of LBP 80% and L>R leg pain 20%.  Says this pain is not new, and has always been part of her chronic preop pain x ~ 6 yrs.  Even though the surgery helped, overall she is still very disabled as before.  Still requires pain meds (oxcyodone 5 mg PO bid).  Before surgery, had tried exhaustive nonop treatment, including multiple PT courses, injections, medications, etc.    She is currently very unhappy with her symptoms, and would like to proceed with surgery.    She told me that preop, she was told that she had both the L4-5 spondylolisthesis and R SIJ pain, and that these were somehow related.  At one point, she had seen Dr. Shirley at [facility] who recommended L4-5 fusion, but later advised her to have her SIJ checked first.  When she saw Dr. Hemphill, per patient he recommended doing the SIJ fusion first, in order to build a better foundation/base, prior to addressing the spondylolisthesis.    Imaging:  Eos 4 spine AP lateral x-rays from 8/8/2018 showed grade 1 degenerative spondylolisthesis L4-5.  There is lower lumbar hypolordosis such that the L4-S1 lordosis is relatively flat, requiring some degree of compensatory hyperlordosis at the upper levels.  Overall sagittal balance is acceptable.    Lumbar flexion-extension x-rays from 3/22/2017 again showed grade 1 spondylolisthesis L4-5.  There is no significant translation on motion fulfilling criteria for instability.  However it should be noted that the spondylolisthesis improves but does not fully correct even on the extension film.    Her last lumbar MRI was from 3/22/2017 this shows spinal stenosis at the L4-5 level with bilateral facet hypertrophy.  There is also a facet cyst at the left L4-5 joint.  Degenerative  changes could also be appreciated at the L5-S1 level.    Impression:  1.  Gr 1 degenerative spondylolisthesis L4-5, with spinal stenosis and neurogenic claudication.  2.  Spondylosis L5-S1.  3.  Sagittal malalignment, with lower lumbar (L4-S1) hypolordosis.    Plan:  Had good long discussion with patient.  At first, I tried reassuring her re the nature of her spinal condition, and recommended continued nonoperative treatment.  However, when it became clear from our conversation that: (1) her current symptoms are still very disabling, and (2) she had already gone through exhaustive nonoperative treatment, with little success, I reconsidered and discussed surgical options.    Recommend fusion for the spondylolisthesis.  In addition, I recommend fusion L5-S1 as well, as this level has already been showing signs of degeneration, and may continue to cause symptoms.  Doing a 2-level fusion would minimize the risk of having a to go back for a 3rd surgery for inadequate symptom relief.  Furthermore, placing pelvic fixation screws may further augment SIJ stabilization bilaterally.  Discussed rationale, risks, benefits, alternatives.  Elects to proceed.  However, has upcoming trip to [place] with daughter in May, and she would like to hold off on surgery until after they get back.      - L L5-S1 TFESI.  If worn off by that time, may also repeat prior to leaving for [place] in May.  - will plan for surgery ~ May/June 2019: 2-level TLIF-SPO L4-5,L5-S1, with pelvic fixation; use of Liz Elite allograft.  - Surg request and PAC referral placed.  - RTC prior to surgery with rpt lumbar MRI, rpt lumbar flex-ext, rpt EOS full-spine ap-lat; will offer participation in Degenpro/Orthofix study.  - Advised to wean off oxycodone.    Total visit time > 40 mins, > 50% counseling and coordination of care.  (Note: even if this visit falls within global period of SIJ fusion surgery, this was more than simply routine postop visit, and evaluation  and discussion extended beyond SIJ fusion surgery; thus justifying coding for different visit type).    Respectfully,    Collins Marshall MD    Orthopaedic Spine Surgery  Dept Orthopaedic Surgery, [facility]  [phone] office, [phone] pager  [email or web address]

## 2019-02-11 NOTE — TELEPHONE ENCOUNTER
RECORDS RECEIVED FROM: New pain LBP see [facility] imaging here including Lumbar cysts & old 2017 Lumbar MRI. Does not need new MRI. HX. Lumbar Stenosis & Spondy.  pelvis XR done 1-8-19 at [facility] with Coni.  DOS 12/3/18, R SI joint fusion per EDGARD ROSEN PATIENT   DATE RECEIVED: 02/11/19    NOTES STATUS DETAILS   OFFICE NOTE from referring provider Internal    OFFICE NOTE from other specialist Care Everywhere    DISCHARGE SUMMARY from hospital N/A    DISCHARGE REPORT from the ER N/A    OPERATIVE REPORT Internal    MEDICATION LIST Internal    IMPLANT RECORD/STICKER Internal    LABS     CBC/DIFF N/A    CULTURES N/A    INJECTIONS DONE IN RADIOLOGY Internal    MRI Internal    CT SCAN Internal    XRAYS (IMAGES & REPORTS) Internal    TUMOR     PATHOLOGY  Slides & report N/A

## 2019-02-14 ENCOUNTER — PRE VISIT (OUTPATIENT)
Dept: ORTHOPEDICS | Facility: CLINIC | Age: 59
End: 2019-02-14

## 2019-02-14 ENCOUNTER — TELEPHONE (OUTPATIENT)
Dept: ANESTHESIOLOGY | Facility: CLINIC | Age: 59
End: 2019-02-14

## 2019-02-14 ENCOUNTER — OFFICE VISIT (OUTPATIENT)
Dept: ORTHOPEDICS | Facility: CLINIC | Age: 59
End: 2019-02-14
Payer: COMMERCIAL

## 2019-02-14 VITALS — HEIGHT: 67 IN | BODY MASS INDEX: 31.08 KG/M2 | WEIGHT: 198 LBS

## 2019-02-14 DIAGNOSIS — M40.209 KYPHOSIS, ACQUIRED: ICD-10-CM

## 2019-02-14 DIAGNOSIS — M43.10 DEGENERATIVE SPONDYLOLISTHESIS: Primary | ICD-10-CM

## 2019-02-14 DIAGNOSIS — M53.3 SI (SACROILIAC) JOINT DYSFUNCTION: ICD-10-CM

## 2019-02-14 RX ORDER — DIAZEPAM 5 MG
TABLET ORAL
Qty: 2 TABLET | Refills: 0 | Status: SHIPPED | OUTPATIENT
Start: 2019-02-14 | End: 2019-03-07

## 2019-02-14 RX ORDER — OXYCODONE HYDROCHLORIDE 5 MG/1
TABLET ORAL
Qty: 90 TABLET | Refills: 0 | Status: SHIPPED | OUTPATIENT
Start: 2019-02-14

## 2019-02-14 ASSESSMENT — MIFFLIN-ST. JEOR: SCORE: 1507.81

## 2019-02-14 NOTE — NURSING NOTE
"Reason For Visit:   Chief Complaint   Patient presents with     RECHECK     Dr. Hemphill pt.      Surgical Followup     DOS 12/3/18 S/P Stealth Assisted Right Minimally Invasive Sacroiliac Joint Fusion     Primary MD: Shoshana Bhatti     ?  No  Occupation [employer] for [employer]   Currently working? Yes.  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 Work related tasks, arthritis in L4-5, Previous injections in Facet Joints   Date of surgery: 12/3/18  Type of surgery: PROCEDURE:  Right minimally invasive sacroiliac joint fusion.   Smoker: No         Ht 1.689 m (5' 6.5\")   Wt 89.8 kg (198 lb)   LMP 12/14/2012   BMI 31.48 kg/m      Pain Assessment  Patient Currently in Pain: Yes  0-10 Pain Scale: 6  Primary Pain Location: Back  Pain Descriptors: Constant    Oswestry (MIKO) Questionnaire    OSWESTRY DISABILITY INDEX 2/11/2019   Count 8   Sum 19   Oswestry Score (%) 47.5   Some recent data might be hidden          Visual Analog Pain Scale  Back Pain Scale 0-10: 6.5  Right leg pain: 0  Left leg pain: 6    Promis 10 Assessment    PROMIS 10 2/11/2019   In general, would you say your health is: Good   In general, would you say your quality of life is: Very good   In general, how would you rate your physical health? Good   In general, how would you rate your mental health, including your mood and your ability to think? Good   In general, how would you rate your satisfaction with your social activities and relationships? Good   In general, please rate how well you carry out your usual social activities and roles Very good   To what extent are you able to carry out your everyday physical activities such as walking, climbing stairs, carrying groceries, or moving a chair? Moderately   How often have you been bothered by emotional problems such as feeling anxious, depressed or irritable? Sometimes   How would you rate your fatigue on average? Moderate   How would you rate your " pain on average?   0 = No Pain  to  10 = Worst Imaginable Pain 6   Global Physical Health Score : Raw Score -   Global Mental Health Score : Raw Score -   Total (Physical + Mental Health Score) -   In general, would you say your health is: 3   In general, would you say your quality of life is: 4   In general, how would you rate your physical health? 3   In general, how would you rate your mental health, including your mood and your ability to think? 3   In general, how would you rate your satisfaction with your social activities and relationships? 3   In general, please rate how well you carry out your usual social activities and roles. (This includes activities at home, at work and in your community, and responsibilities as a parent, child, spouse, employee, friend, etc.) 4   To what extent are you able to carry out your everyday physical activities such as walking, climbing stairs, carrying groceries, or moving a chair? 3   In the past 7 days, how often have you been bothered by emotional problems such as feeling anxious, depressed, or irritable? 3   In the past 7 days, how would you rate your fatigue on average? 3   In the past 7 days, how would you rate your pain on average, where 0 means no pain, and 10 means worst imaginable pain? 6   Global Mental Health Score 13   Global Physical Health Score 12   PROMIS TOTAL - SUBSCORES 25   Some recent data might be hidden                Tracey Cobb LPN

## 2019-02-14 NOTE — LETTER
2/14/2019      RE: Rosemarie Zabala  [address] [place] [address]       REASON FOR CONSULTATION: RECHECK (Dr. Hemphill pt. ) and Surgical Followup (DOS 12/3/18 S/P Stealth Assisted Right Minimally Invasive Sacroiliac Joint Fusion)     REFERRING PHYSICIAN: Referred Self   PCP:Adeline Barron    History of Present Illness:  Rosemarie Zabala is a 58 year old female with a PMH significant for anxiety, depression. Last evaluated by Coni Tristan PA-C on 1/8/2019. She is 2.5 months s/p Right sacroiliac joint fusion (12/3/2018) by Dr. Hemphill. She reported that overall her surgery helped however, her chronic midline low back pain persisted unaltered. She has been doing PT, but her progression has been slow 2/2 her 'out of control' low back pain. Her low back pain is currently managed with Oxycodone BID. She is not taking any NSAIDs per post op guidelines.     Patient reports a 6-year history of low back pain with associated radicular symptoms.  She reports that following her Right SI fusion, she had decreased her level of activity and therefore, was not having much low back pain.  However, approximately 4-5 weeks ago she began to increase her level of activity and developed severe progressive re-worsening of low back pain.  She reports predominantly left lower extremity radicular symptoms that are diffuse in nature and do not appear to be following any particular dermatome.    She reports that she had been seen by another spine surgeon (Amy Shirley) prior to working with Dr. Hemphill and was instructed that she may need a lumbar fusion.  She reports that her and Dr. Hemphill decided to pursue her right SI joint fusion prior to discussing the potential lumbar fusion.  Today she is interested in discussing management of her current pain and the potential need for future surgical intervention.    Back 80%, Leg 20%,  Left > Right  Worse: Movement, remaining stationary; always hurts   Better:  Narcotics    Previous treatment:   1. Physical therapy   2. Medications: oxycodone, melatonin, atarax   3. Reports a history of epidural steroid injections both to lumbar and SI joints which provided her with ~14 days of significant relief    Oswestry (MIKO) Questionnaire    OSWESTRY DISABILITY INDEX 2/11/2019   Count 8   Sum 19   Oswestry Score (%) 47.5   Some recent data might be hidden        ROS:  A 12-point review of systems was completed and is negative except for otherwise noted above in the history of present illness.    Med Hx:  Past Medical History:   Diagnosis Date     Calculus of kidney 1994     Essential hypertension, benign     resolved 2014     Generalised anxiety disorder 2011     Genital herpes 2004     Left knee pain 2005    left knee cortisone injection     Mixed hyperlipidemia      Other and unspecified malignant neoplasm of skin of other and unspecified parts of face     left cheek, treated with Aldara cream       Surg Hx:  Past Surgical History:   Procedure Laterality Date     ARTHROSCOPY KNEE  2003    L Knee ACL repair     C REMOVAL OF KIDNEY STONE  1994    L Kidney stone     C/SECTION, LOW TRANSVERSE      x  2     CHOLECYSTECTOMY, LAPOROSCOPIC  2007    precancerous changes and gallstones     COLONOSCOPY  2012    NL. repeat 5 years + family history cancer     COLONOSCOPY N/A 5/23/2017    Procedure: COLONOSCOPY;  COLONOSCOPY;  Surgeon: Declan Gonzales MD;  Location:  GI     COSMETIC ABDOMINOPLASTY  2013     HC REDUCTION OF LARGE BREAST       INJECT SACROILIAC JOINT Right 11/14/2017    Procedure: INJECT SACROILIAC JOINT;  Right Sacroiliac Joint Injection;  Surgeon: Christen Richardson MD;  Location: [facility] OR     INJECT SACROILIAC JOINT Bilateral 2/21/2018    Procedure: INJECT SACROILIAC JOINT;  SacroIliac Joint Injection;  Surgeon: Berny Connelly MD;  Location: [facility] OR     INJECT SACROILIAC JOINT Bilateral 7/13/2018    Procedure: INJECT SACROILIAC JOINT;  Bilateral  "Sacroiliac Joint Injection;  Surgeon: Berny Connelly MD;  Location:  OR     OPTICAL TRACKING SYSTEM FUSION SACRAL ILIAC Right 12/3/2018    Procedure: Stealth Assisted Right Minimally Invasive Sacroiliac Joint Fusion;  Surgeon: Joel Hemphill MD;  Location: [facility] OR     SALPINGO OOPHORECTOMY,R/L/ARTIE      right oophorectomy for benign tumor       Allergies:  Allergies   Allergen Reactions     No Known Allergies        Meds:  Current Outpatient Medications   Medication     acetaminophen (TYLENOL) 325 MG tablet     Ascorbic Acid (VITAMIN C PO)     atorvastatin (LIPITOR) 20 MG tablet     calcium carbonate (OS-VALENTIN 500 MG [employer] [place]) 500 MG tablet     Cholecalciferol (VITAMIN D) 1000 UNITS capsule     clonazePAM (KLONOPIN) 0.25 mg TABS half-tab     Escitalopram Oxalate (LEXAPRO PO)     hydrOXYzine (ATARAX) 25 MG tablet     IBUPROFEN PO     lisinopril (PRINIVIL/ZESTRIL) 10 MG tablet     MELATONIN PO     Omega-3 Fatty Acids (OMEGA 3) 1200 MG capsule     senna-docusate (SENOKOT-S/PERICOLACE) 8.6-50 MG tablet     diazepam (VALIUM) 5 MG tablet     oxyCODONE (ROXICODONE) 5 MG tablet     No current facility-administered medications for this visit.        Fam Hx:  Family History   Problem Relation Age of Onset     Depression Mother      Breast Cancer Mother      Gynecology Mother          of ovarian cancer     Hypertension Father      Cancer - colorectal Father         at age 64     Lipids Father      Dementia Father         mild       P/S Hx:  Social History     Tobacco Use     Smoking status: Never Smoker     Smokeless tobacco: Never Used   Substance Use Topics     Alcohol use: Yes     Comment: 3 drinks a week         Physical Exam:  Very pleasant, healthy appearing, alert, oriented x 3, cooperative.  Appropriate mood and affect.  Not in cardiorespiratory distress.  Ht 1.689 m (5' 6.5\")   Wt 89.8 kg (198 lb)   LMP 2012   BMI 31.48 kg/m     Normal upright posture.    Normal gait without " assistive device.  No antalgia / imbalance.  Back: no deformity, no skin lesions.  Localizes pain at mid-low back  Tenderness: (+) midline, (+) paraspinal, (-) R and (+) L PSIS.  ROM:  Pain with extension/flexion     Neuro Exam:  Motor: 5/5 strength for all muscle groups in RLE's. 4/5 strength for Left hip flexion and knee extension/flexion 2/2 pain, 5/5 Left EHL/FHL, TA/Gastrocnemius   Sensory:  Intact to light touch in both LE's.   Reflexes:  Knee 2+ bilat.  Ankle 2+ bilat.  (-) Babinski, (-) clonus.    Lower Extremity:  Equal leg lengths, full pulses, (-) atrophy / asymmetry.  Full painless passive knee and ankle motion.  Straight leg raise: (-) right, (+) left.  Hip impingement: (-) right, (-) left.  REBEKAH/Elmo's: (-) right, (+) left.      Sacroiliac Joint Tests:      TEST RIGHT LEFT   PSIS tenderness - -   Rachael finger sign - -   REBEKAH/Elmo - +   Thigh thrust - +           Gapping -   Compression -   Sacral thrust -   Gaenslen's +           Imaging:  See below    Impression:   - Grade 1 spondylolisthesis     Plan:   Discussed patient's prior imaging which demonstrated Grade 1 spondylolisthesis. Unfortunately, her MRI is >2 years old.   Discussed patient's complex pain and the process to determine the primary source of her pain.   Given patient's worsening pain, will pursue work up in preparation for L4-S1 fusion   Order MRI Lumbar spine  Order L5/S1 trans foraminal ASHLEY today  Patient will call to order repeat L5/S1 trans foraminal ASHLEY prior to patient's May vacation with her daughter (she is headed to [place]) in May.  Repeat flexion/extension XR on way out of clinic    Anne Richey MD  Orthopaedic Surgery, PGY-1    Attestation:  I (Dr. Collins Marshall - Spine Surgeon) have personally evaluated patient with PGY-1 Rossi, and agree with findings and plan outlined in the note.  I discussed at length with the patient/family, explained the nature of spinal condition, and formulated workup and/or  treatment plan together.  All questions were answered to the best of my ability and to patient's apparent satisfaction.    58/f, 1st visit with me.  She is 2.5 mos s/p R MIS SIJ fusion by Dr. Hemphill, who is currently on medical leave; thus she is seeing me instead.  Accompanied by .    Says SIJ fusion helped with R buttock pain.  However, still complaining of LBP 80% and L>R leg pain 20%.  Says this pain is not new, and has always been part of her chronic preop pain x ~ 6 yrs.  Even though the surgery helped, overall she is still very disabled as before.  Still requires pain meds (oxcyodone 5 mg PO bid).  Before surgery, had tried exhaustive nonop treatment, including multiple PT courses, injections, medications, etc.    She is currently very unhappy with her symptoms, and would like to proceed with surgery.    She told me that preop, she was told that she had both the L4-5 spondylolisthesis and R SIJ pain, and that these were somehow related.  At one point, she had seen Dr. Shirley at [facility] who recommended L4-5 fusion, but later advised her to have her SIJ checked first.  When she saw Dr. Hemphill, per patient he recommended doing the SIJ fusion first, in order to build a better foundation/base, prior to addressing the spondylolisthesis.    Imaging:  Eos 4 spine AP lateral x-rays from 8/8/2018 showed grade 1 degenerative spondylolisthesis L4-5.  There is lower lumbar hypolordosis such that the L4-S1 lordosis is relatively flat, requiring some degree of compensatory hyperlordosis at the upper levels.  Overall sagittal balance is acceptable.    Lumbar flexion-extension x-rays from 3/22/2017 again showed grade 1 spondylolisthesis L4-5.  There is no significant translation on motion fulfilling criteria for instability.  However it should be noted that the spondylolisthesis improves but does not fully correct even on the extension film.    Her last lumbar MRI was from 3/22/2017 this shows spinal stenosis at the L4-5  level with bilateral facet hypertrophy.  There is also a facet cyst at the left L4-5 joint.  Degenerative changes could also be appreciated at the L5-S1 level.    Impression:  1.  Gr 1 degenerative spondylolisthesis L4-5, with spinal stenosis and neurogenic claudication.  2.  Spondylosis L5-S1.  3.  Sagittal malalignment, with lower lumbar (L4-S1) hypolordosis.    Plan:  Had good long discussion with patient.  At first, I tried reassuring her re the nature of her spinal condition, and recommended continued nonoperative treatment.  However, when it became clear from our conversation that: (1) her current symptoms are still very disabling, and (2) she had already gone through exhaustive nonoperative treatment, with little success, I reconsidered and discussed surgical options.    Recommend fusion for the spondylolisthesis.  In addition, I recommend fusion L5-S1 as well, as this level has already been showing signs of degeneration, and may continue to cause symptoms.  Doing a 2-level fusion would minimize the risk of having a to go back for a 3rd surgery for inadequate symptom relief.  Furthermore, placing pelvic fixation screws may further augment SIJ stabilization bilaterally.  Discussed rationale, risks, benefits, alternatives.  Elects to proceed.  However, has upcoming trip to [place] with daughter in May, and she would like to hold off on surgery until after they get back.      - L L5-S1 TFESI.  If worn off by that time, may also repeat prior to leaving for [place] in May.  - will plan for surgery ~ May/June 2019: 2-level TLIF-SPO L4-5,L5-S1, with pelvic fixation; use of [employer] allograft.  - Surg request and PAC referral placed.  - RTC prior to surgery with rpt lumbar MRI, rpt lumbar flex-ext, rpt EOS full-spine ap-lat; will offer participation in Degenpro/Orthofix study.  - Advised to wean off oxycodone.    Total visit time > 40 mins, > 50% counseling and coordination of care.  (Note: even if this visit falls  within global period of SIJ fusion surgery, this was more than simply routine postop visit, and evaluation and discussion extended beyond SIJ fusion surgery; thus justifying coding for different visit type).      Collins Marshall MD

## 2019-02-14 NOTE — LETTER
2/14/2019       RE: Rosemarie Zabala  [address] [place] [address]     Dear Colleague,    Thank you for referring your patient, Rosemarie Zabala, to the [facility]. Please see a copy of my visit note below.    REASON FOR CONSULTATION: RECHECK (Dr. Hemphill pt. ) and Surgical Followup (DOS 12/3/18 S/P Stealth Assisted Right Minimally Invasive Sacroiliac Joint Fusion)     REFERRING PHYSICIAN: Referred Self   PCP:Adeline Barron    History of Present Illness:  Rosemarie Zabala is a 58 year old female with a PMH significant for anxiety, depression. Last evaluated by Coni Tristan PA-C on 1/8/2019. She is 2.5 months s/p Right sacroiliac joint fusion (12/3/2018) by Dr. Hemphill. She reported that overall her surgery helped however, her chronic midline low back pain persisted unaltered. She has been doing PT, but her progression has been slow 2/2 her 'out of control' low back pain. Her low back pain is currently managed with Oxycodone BID. She is not taking any NSAIDs per post op guidelines.     Patient reports a 6-year history of low back pain with associated radicular symptoms.  She reports that following her Right SI fusion, she had decreased her level of activity and therefore, was not having much low back pain.  However, approximately 4-5 weeks ago she began to increase her level of activity and developed severe progressive re-worsening of low back pain.  She reports predominantly left lower extremity radicular symptoms that are diffuse in nature and do not appear to be following any particular dermatome.    She reports that she had been seen by another spine surgeon (Amy Shirley) prior to working with Dr. Hemphill and was instructed that she may need a lumbar fusion.  She reports that her and Dr. Hemphill decided to pursue her right SI joint fusion prior to discussing the potential lumbar fusion.  Today she is interested in  discussing management of her current pain and the potential need for future surgical intervention.    Back 80%, Leg 20%,  Left > Right  Worse: Movement, remaining stationary; always hurts   Better: Narcotics    Previous treatment:   1. Physical therapy   2. Medications: oxycodone, melatonin, atarax   3. Reports a history of epidural steroid injections both to lumbar and SI joints which provided her with ~14 days of significant relief    Oswestry (MIKO) Questionnaire    OSWESTRY DISABILITY INDEX 2/11/2019   Count 8   Sum 19   Oswestry Score (%) 47.5   Some recent data might be hidden        ROS:  A 12-point review of systems was completed and is negative except for otherwise noted above in the history of present illness.    Med Hx:  Past Medical History:   Diagnosis Date     Calculus of kidney 1994     Essential hypertension, benign     resolved 2014     Generalised anxiety disorder 2011     Genital herpes 2004     Left knee pain 2005    left knee cortisone injection     Mixed hyperlipidemia      Other and unspecified malignant neoplasm of skin of other and unspecified parts of face     left cheek, treated with Aldara cream       Surg Hx:  Past Surgical History:   Procedure Laterality Date     ARTHROSCOPY KNEE  2003    L Knee ACL repair     C REMOVAL OF KIDNEY STONE  1994    L Kidney stone     C/SECTION, LOW TRANSVERSE      x  2     CHOLECYSTECTOMY, LAPOROSCOPIC  2007    precancerous changes and gallstones     COLONOSCOPY  2012    NL. repeat 5 years + family history cancer     COLONOSCOPY N/A 5/23/2017    Procedure: COLONOSCOPY;  COLONOSCOPY;  Surgeon: Declan Gonzales MD;  Location:  GI     COSMETIC ABDOMINOPLASTY  2013     HC REDUCTION OF LARGE BREAST       INJECT SACROILIAC JOINT Right 11/14/2017    Procedure: INJECT SACROILIAC JOINT;  Right Sacroiliac Joint Injection;  Surgeon: Christen Richardson MD;  Location: [facility] OR     INJECT SACROILIAC JOINT Bilateral 2/21/2018    Procedure: INJECT SACROILIAC JOINT;   SacroIliac Joint Injection;  Surgeon: Berny Connelly MD;  Location: [facility] OR     INJECT SACROILIAC JOINT Bilateral 2018    Procedure: INJECT SACROILIAC JOINT;  Bilateral Sacroiliac Joint Injection;  Surgeon: Berny Connelly MD;  Location: [facility] OR     OPTICAL TRACKING SYSTEM FUSION SACRAL ILIAC Right 12/3/2018    Procedure: Stealth Assisted Right Minimally Invasive Sacroiliac Joint Fusion;  Surgeon: Joel Hemphill MD;  Location: [facility] OR     SALPINGO OOPHORECTOMY,R/L/ARTIE  1981    right oophorectomy for benign tumor       Allergies:  Allergies   Allergen Reactions     No Known Allergies        Meds:  Current Outpatient Medications   Medication     acetaminophen (TYLENOL) 325 MG tablet     Ascorbic Acid (VITAMIN C PO)     atorvastatin (LIPITOR) 20 MG tablet     calcium carbonate (OS-VALENTIN 500 MG [place]) 500 MG tablet     Cholecalciferol (VITAMIN D) 1000 UNITS capsule     clonazePAM (KLONOPIN) 0.25 mg TABS half-tab     Escitalopram Oxalate (LEXAPRO PO)     hydrOXYzine (ATARAX) 25 MG tablet     IBUPROFEN PO     lisinopril (PRINIVIL/ZESTRIL) 10 MG tablet     MELATONIN PO     Omega-3 Fatty Acids (OMEGA 3) 1200 MG capsule     senna-docusate (SENOKOT-S/PERICOLACE) 8.6-50 MG tablet     diazepam (VALIUM) 5 MG tablet     oxyCODONE (ROXICODONE) 5 MG tablet     No current facility-administered medications for this visit.        Fam Hx:  Family History   Problem Relation Age of Onset     Depression Mother      Breast Cancer Mother      Gynecology Mother          of ovarian cancer     Hypertension Father      Cancer - colorectal Father         at age 64     Lipids Father      Dementia Father         mild       P/S Hx:  Social History     Tobacco Use     Smoking status: Never Smoker     Smokeless tobacco: Never Used   Substance Use Topics     Alcohol use: Yes     Comment: 3 drinks a week         Physical Exam:  Very pleasant, healthy appearing, alert, oriented x 3, cooperative.  Appropriate  "mood and affect.  Not in cardiorespiratory distress.  Ht 1.689 m (5' 6.5\")   Wt 89.8 kg (198 lb)   LMP 12/14/2012   BMI 31.48 kg/m     Normal upright posture.    Normal gait without assistive device.  No antalgia / imbalance.  Back: no deformity, no skin lesions.  Localizes pain at mid-low back  Tenderness: (+) midline, (+) paraspinal, (-) R and (+) L PSIS.  ROM:  Pain with extension/flexion     Neuro Exam:  Motor: 5/5 strength for all muscle groups in RLE's. 4/5 strength for Left hip flexion and knee extension/flexion 2/2 pain, 5/5 Left EHL/FHL, TA/Gastrocnemius   Sensory:  Intact to light touch in both LE's.   Reflexes:  Knee 2+ bilat.  Ankle 2+ bilat.  (-) Babinski, (-) clonus.    Lower Extremity:  Equal leg lengths, full pulses, (-) atrophy / asymmetry.  Full painless passive knee and ankle motion.  Straight leg raise: (-) right, (+) left.  Hip impingement: (-) right, (-) left.  REBEKAH/Elmo's: (-) right, (+) left.      Sacroiliac Joint Tests:      TEST RIGHT LEFT   PSIS tenderness - -   Rachael finger sign - -   REBEKAH/Elmo - +   Thigh thrust - +           Gapping -   Compression -   Sacral thrust -   Gaenslen's +           Imaging:  See below    Impression:   - Grade 1 spondylolisthesis     Plan:   Discussed patient's prior imaging which demonstrated Grade 1 spondylolisthesis. Unfortunately, her MRI is >2 years old.   Discussed patient's complex pain and the process to determine the primary source of her pain.   Given patient's worsening pain, will pursue work up in preparation for L4-S1 fusion   Order MRI Lumbar spine  Order L5/S1 trans foraminal ASHLEY today  Patient will call to order repeat L5/S1 trans foraminal ASHLEY prior to patient's May vacation with her daughter (she is headed to [place]) in May.  Repeat flexion/extension XR on way out of clinic    Anne Richey MD  Orthopaedic Surgery, PGY-1    Attestation:  I (Dr. Collins Marshall - Spine Surgeon) have personally evaluated patient with PGY-1 " Rossi, and agree with findings and plan outlined in the note.  I discussed at length with the patient/family, explained the nature of spinal condition, and formulated workup and/or treatment plan together.  All questions were answered to the best of my ability and to patient's apparent satisfaction.    58/f, 1st visit with me.  She is 2.5 mos s/p R MIS SIJ fusion by Dr. Hemphill, who is currently on medical leave; thus she is seeing me instead.  Accompanied by .    Says SIJ fusion helped with R buttock pain.  However, still complaining of LBP 80% and L>R leg pain 20%.  Says this pain is not new, and has always been part of her chronic preop pain x ~ 6 yrs.  Even though the surgery helped, overall she is still very disabled as before.  Still requires pain meds (oxcyodone 5 mg PO bid).  Before surgery, had tried exhaustive nonop treatment, including multiple PT courses, injections, medications, etc.    She is currently very unhappy with her symptoms, and would like to proceed with surgery.    She told me that preop, she was told that she had both the L4-5 spondylolisthesis and R SIJ pain, and that these were somehow related.  At one point, she had seen Dr. Shirley at [facility] who recommended L4-5 fusion, but later advised her to have her SIJ checked first.  When she saw Dr. Hemphill, per patient he recommended doing the SIJ fusion first, in order to build a better foundation/base, prior to addressing the spondylolisthesis.    Imaging:  Eos 4 spine AP lateral x-rays from 8/8/2018 showed grade 1 degenerative spondylolisthesis L4-5.  There is lower lumbar hypolordosis such that the L4-S1 lordosis is relatively flat, requiring some degree of compensatory hyperlordosis at the upper levels.  Overall sagittal balance is acceptable.    Lumbar flexion-extension x-rays from 3/22/2017 again showed grade 1 spondylolisthesis L4-5.  There is no significant translation on motion fulfilling criteria for instability.  However it  should be noted that the spondylolisthesis improves but does not fully correct even on the extension film.    Her last lumbar MRI was from 3/22/2017 this shows spinal stenosis at the L4-5 level with bilateral facet hypertrophy.  There is also a facet cyst at the left L4-5 joint.  Degenerative changes could also be appreciated at the L5-S1 level.    Impression:  1.  Gr 1 degenerative spondylolisthesis L4-5, with spinal stenosis and neurogenic claudication.  2.  Spondylosis L5-S1.  3.  Sagittal malalignment, with lower lumbar (L4-S1) hypolordosis.    Plan:  Had good long discussion with patient.  At first, I tried reassuring her re the nature of her spinal condition, and recommended continued nonoperative treatment.  However, when it became clear from our conversation that: (1) her current symptoms are still very disabling, and (2) she had already gone through exhaustive nonoperative treatment, with little success, I reconsidered and discussed surgical options.    Recommend fusion for the spondylolisthesis.  In addition, I recommend fusion L5-S1 as well, as this level has already been showing signs of degeneration, and may continue to cause symptoms.  Doing a 2-level fusion would minimize the risk of having a to go back for a 3rd surgery for inadequate symptom relief.  Furthermore, placing pelvic fixation screws may further augment SIJ stabilization bilaterally.  Discussed rationale, risks, benefits, alternatives.  Elects to proceed.  However, has upcoming trip to [place] with daughter in May, and she would like to hold off on surgery until after they get back.      - L L5-S1 TFESI.  If worn off by that time, may also repeat prior to leaving for [place] in May.  - will plan for surgery ~ May/June 2019: 2-level TLIF-SPO L4-5,L5-S1, with pelvic fixation; use of Liz Elite allograft.  - Surg request and PAC referral placed.  - RTC prior to surgery with rpt lumbar MRI, rpt lumbar flex-ext, rpt EOS full-spine ap-lat; will  offer participation in Degenpro/Orthofix study.  - Advised to wean off oxycodone.    Total visit time > 40 mins, > 50% counseling and coordination of care.  (Note: even if this visit falls within global period of SIJ fusion surgery, this was more than simply routine postop visit, and evaluation and discussion extended beyond SIJ fusion surgery; thus justifying coding for different visit type).      Again, thank you for allowing me to participate in the care of your patient.      Sincerely,    Collins Marshall MD

## 2019-02-14 NOTE — TELEPHONE ENCOUNTER
Spoke with: Rosemarie     Date Scheduled: 3/12/19     Provider: Dr. Camarena     : Yes     F/U Appointment: N/A   Patient was educated on pre-op nurse call: Yes

## 2019-02-14 NOTE — NURSING NOTE
Teaching Flowsheet   Relevant Diagnosis: spondy  Teaching Topic: preop spine     Person(s) involved in teaching:   Patient     Motivation Level:  Asks Questions: Yes  Eager to Learn: Yes  Cooperative: Yes  Receptive (willing/able to accept information): Yes  Any cultural factors/[employer] beliefs that may influence understanding or compliance? No       Patient demonstrates understanding of the following:  Reason for the appointment, diagnosis and treatment plan: Yes  Knowledge of proper use of medications and conditions for which they are ordered (with special attention to potential side effects or drug interactions): Yes  Which situations necessitate calling provider and whom to contact: Yes       Teaching Concerns Addressed:        Proper use and care of meds. (medical equip, care aids, etc.): Yes  Nutritional needs and diet plan: Yes  Pain management techniques: Yes  Wound Care: Yes  How and/when to access community resources: Yes     Instructional Materials Used/Given: preop pkt     Time spent with patient: 15 minutes.

## 2019-02-18 ENCOUNTER — CARE COORDINATION (OUTPATIENT)
Dept: ANESTHESIOLOGY | Facility: CLINIC | Age: 59
End: 2019-02-18

## 2019-02-18 DIAGNOSIS — M54.16 LUMBAR RADICULOPATHY: Primary | ICD-10-CM

## 2019-02-19 ENCOUNTER — TELEPHONE (OUTPATIENT)
Dept: ORTHOPEDICS | Facility: CLINIC | Age: 59
End: 2019-02-19

## 2019-02-19 NOTE — LETTER
2019       Patient:  Rosemarie Zabala  :   1960  MRN:     [id number]  Physician: ELDER LOBO    Rosemarie Zabala will be unable to travel to [place] because she   Needs Spine surgery.          Electronically signed by Elder Lobo MD

## 2019-02-19 NOTE — TELEPHONE ENCOUNTER
See phone message.  See recent dictation.  I called  pt back.  She decided to cancel her trip to [place] & do the spine surgery first so she just needs a letter from airline that she needs spine surgery in order to cancel tickets.  Letter mailed to pt.   Jaymie Jean RN.  .[facility] Call Center    Phone Message    May a detailed message be left on voicemail: yes    Reason for Call: Other: Pt has a scheduled MRI for 2/24/2019. Pt decided to proceed forward with scheduling for surgery and would like to speak to Jaymie in regards to some forms that needs to be filled out or requested per Pt. Please follow up with Pt.      Action Taken: Message routed to:  [facility] ([facility] Ortho Clinic

## 2019-02-24 ENCOUNTER — ANCILLARY PROCEDURE (OUTPATIENT)
Dept: MRI IMAGING | Facility: CLINIC | Age: 59
End: 2019-02-24
Attending: ORTHOPAEDIC SURGERY
Payer: COMMERCIAL

## 2019-02-24 DIAGNOSIS — M43.10 DEGENERATIVE SPONDYLOLISTHESIS: ICD-10-CM

## 2019-03-04 ENCOUNTER — TELEPHONE (OUTPATIENT)
Dept: ANESTHESIOLOGY | Facility: CLINIC | Age: 59
End: 2019-03-04

## 2019-03-04 NOTE — TELEPHONE ENCOUNTER
[facility] Call Center    Phone Message    May a detailed message be left on voicemail: yes    Reason for Call: Other: Pt would like to know if her Prior Authorization is all set for upcoming procedure on 3/12/19.Pt's  has to plan his work day around procedure and pt would like to know what time about the procedure will be so that  can plan his work schedule.Please call pt back/     Action Taken: Message routed to:  [facility] pain clinic

## 2019-03-04 NOTE — PROGRESS NOTES
Lumbar MRI done 2/24/2019 show evidence of advanced facet arthropathy with spinal stenosis bilateral subarticular and grade 1 degenerative spondylolisthesis L4-5.  Also with advanced disc degeneration or spondylosis at L5-S1 without significant stenosis.  There is also some degenerative changes at L2-3 and L3-4 with some right-sided foraminal narrowing.    These will be reviewed and discussed further with the patient when she comes in for her preoperative visit prior to her planned surgery sometime in May or June.    Overall these findings did not change my original recommendation which is a 2 level fusion with osteotomies L4-5 and L5-S1, with pelvic fixation.

## 2019-03-04 NOTE — TELEPHONE ENCOUNTER
RNCC called back pt to address questions.  Pt advised that procedure appointment is tentatively scheduled for 0800; however, that may change.  Pt advised that she will receive pre-op phone call with procedure and check in time 1-3 days before her procedure.  Pt also advised that message has been left for PA team for status of authorization.  RNCC will update pt via My Chart with any information.    Isabela Tamez RN, BSN

## 2019-03-08 ENCOUNTER — TELEPHONE (OUTPATIENT)
Dept: ANESTHESIOLOGY | Facility: CLINIC | Age: 59
End: 2019-03-08

## 2019-03-08 NOTE — TELEPHONE ENCOUNTER
[facility] Call Center    Phone Message    May a detailed message be left on voicemail: yes    Reason for Call: Other: Rosemarie states that she received a denial letter from her insurance for coverage of the procedure on 3.12.19.  The letter also indicated that more than 1 procedure was going to be done on the same date-please clarify for Rosemarie what that would have been.     Action Taken: Message routed to:  [facility] ump pain

## 2019-03-11 NOTE — TELEPHONE ENCOUNTER
RNCC called back pt to address questions about PA.  Pt reported that PA is actually approved, and that there was a duplicate request submitted to her insurance.  Pt has no additional questions at this time.     Isabela Tamez RN, BSN

## 2019-03-12 ENCOUNTER — ANCILLARY PROCEDURE (OUTPATIENT)
Dept: RADIOLOGY | Facility: AMBULATORY SURGERY CENTER | Age: 59
End: 2019-03-12
Payer: COMMERCIAL

## 2019-03-12 ENCOUNTER — HOSPITAL ENCOUNTER (OUTPATIENT)
Facility: AMBULATORY SURGERY CENTER | Age: 59
End: 2019-03-12
Payer: COMMERCIAL

## 2019-03-12 VITALS
RESPIRATION RATE: 16 BRPM | BODY MASS INDEX: 29.51 KG/M2 | OXYGEN SATURATION: 97 % | HEART RATE: 71 BPM | WEIGHT: 188 LBS | SYSTOLIC BLOOD PRESSURE: 132 MMHG | HEIGHT: 67 IN | TEMPERATURE: 97.4 F | DIASTOLIC BLOOD PRESSURE: 73 MMHG

## 2019-03-12 DIAGNOSIS — R52 PAIN: ICD-10-CM

## 2019-03-12 RX ORDER — METHYLPREDNISOLONE ACETATE 40 MG/ML
INJECTION, SUSPENSION INTRA-ARTICULAR; INTRALESIONAL; INTRAMUSCULAR; SOFT TISSUE PRN
Status: DISCONTINUED | OUTPATIENT
Start: 2019-03-12 | End: 2019-03-12 | Stop reason: HOSPADM

## 2019-03-12 RX ORDER — IOPAMIDOL 408 MG/ML
INJECTION, SOLUTION INTRATHECAL PRN
Status: DISCONTINUED | OUTPATIENT
Start: 2019-03-12 | End: 2019-03-12 | Stop reason: HOSPADM

## 2019-03-12 RX ORDER — LIDOCAINE HYDROCHLORIDE 10 MG/ML
INJECTION, SOLUTION EPIDURAL; INFILTRATION; INTRACAUDAL; PERINEURAL PRN
Status: DISCONTINUED | OUTPATIENT
Start: 2019-03-12 | End: 2019-03-12 | Stop reason: HOSPADM

## 2019-03-12 ASSESSMENT — MIFFLIN-ST. JEOR: SCORE: 1470.39

## 2019-03-12 NOTE — DISCHARGE INSTRUCTIONS
Home Care Instructions after an Epidural Steroid Pain Injection    A lumbar epidural steroid injection delivers steroid medication directly into the area that may be causing your lower back pain and/or leg pain. A cervical or thoracic epidural steroid injection delivers steroids into the epidural space surrounding spinal nerve roots to help relieve pain in the upper spine/neck.    Activity  -Rest today  -Do not work today  -Resume normal activity tomorrow  -DO NOT shower for 24 hours  -DO NOT remove bandaid for 24 hours    Pain  -You may experience soreness at the injection site for one or two days  -You may use an ice pack for 20 minutes every 2 hours for the first 24 hours  -You may use a heating pad after the first 24 hours  -You may use Tylenol (acetaminophen) every 4 hours or other pain medicines as     directed by your physician    You may experience numbness radiating into your legs or arms (depending on the procedure location). This numbness may last several hours. Until sensation returns to normal; please use caution in walking, climbing stairs, and stepping out of your vehicle, etc.    DID YOU RECEIVE SEDATION TODAY?  No      Common side effects of steroids:  Not everyone will experience corticosteroid side effects. If side effects are experienced, they will gradually subside in the 7-10 day period following an injection. Most common side effects include:  -Flushed face and/or chest  -Feeling of warmth, particularly in the face but could be an overall feeling of warmth  -Increased blood sugar in diabetic patients  -Menstrual irregularities my occur. If taking hormone-based birth control an alternate method of birth control is recommended  -Sleep disturbances and/or mood swings are possible  -Leg cramps    Please contact us if you have:  -Severe pain  -Fever more than 101.5 degrees Fahrenheit  -Signs of infection at the injection site (redness, swelling, or drainage)    If you have questions, please contact  our office at [phone] between the hours of 7:00 am and 3:00 pm Monday through Friday. After office hours you can contact the on call provider by dialing [phone]. If you need immediate attention, we recommend that you go to a hospital emergency room or dial [phone].

## 2019-03-12 NOTE — H&P
History of Present Illness:  Rosemarie Zabala is a 58 year old female with a PMH significant for anxiety, depression. Last evaluated by Coni Tristan PA-C on 1/8/2019. She is 2.5 months s/p Right sacroiliac joint fusion (12/3/2018) by Dr. Hemphill. She reported that overall her surgery helped however, her chronic midline low back pain persisted unaltered. She has been doing PT, but her progression has been slow 2/2 her 'out of control' low back pain. Her low back pain is currently managed with Oxycodone BID. She is not taking any NSAIDs per post op guidelines.      Patient reports a 6-year history of low back pain with associated radicular symptoms.  She reports that following her Right SI fusion, she had decreased her level of activity and therefore, was not having much low back pain.  However, approximately 4-5 weeks ago she began to increase her level of activity and developed severe progressive re-worsening of low back pain.  She reports predominantly left lower extremity radicular symptoms that are diffuse in nature and do not appear to be following any particular dermatome.     She reports that she had been seen by another spine surgeon (Amy Shirley) prior to working with Dr. Hemphill and was instructed that she may need a lumbar fusion.  She reports that her and Dr. Hemphill decided to pursue her right SI joint fusion prior to discussing the potential lumbar fusion.  Today she is interested in discussing management of her current pain and the potential need for future surgical intervention.     Back 80%, Leg 20%,  Left > Right  Worse: Movement, remaining stationary; always hurts   Better: Narcotics     Previous treatment:   1. Physical therapy   2. Medications: oxycodone, melatonin, atarax   3. Reports a history of epidural steroid injections both to lumbar and SI joints which provided her with ~14 days of significant relief     Oswestry (MIKO) Questionnaire     OSWESTRY DISABILITY INDEX 2/11/2019   Count  8   Sum 19   Oswestry Score (%) 47.5   Some recent data might be hidden         ROS:  A 12-point review of systems was completed and is negative except for otherwise noted above in the history of present illness.     Med Hx:  Past Medical History   Past Medical History:   Diagnosis Date     Calculus of kidney 1994     Essential hypertension, benign       resolved 2014     Generalised anxiety disorder 2011     Genital herpes 2004     Left knee pain 2005     left knee cortisone injection     Mixed hyperlipidemia       Other and unspecified malignant neoplasm of skin of other and unspecified parts of face       left cheek, treated with Aldara cream            Surg Hx:  Past Surgical History         Past Surgical History:   Procedure Laterality Date     ARTHROSCOPY KNEE   2003     L Knee ACL repair     C REMOVAL OF KIDNEY STONE   1994     L Kidney stone     C/SECTION, LOW TRANSVERSE         x  2     CHOLECYSTECTOMY, LAPOROSCOPIC   2007     precancerous changes and gallstones     COLONOSCOPY   2012     NL. repeat 5 years + family history cancer     COLONOSCOPY N/A 5/23/2017     Procedure: COLONOSCOPY;  COLONOSCOPY;  Surgeon: Declan Gonzales MD;  Location:  GI     COSMETIC ABDOMINOPLASTY   2013     HC REDUCTION OF LARGE BREAST         INJECT SACROILIAC JOINT Right 11/14/2017     Procedure: INJECT SACROILIAC JOINT;  Right Sacroiliac Joint Injection;  Surgeon: Christen Richardson MD;  Location: [facility]     INJECT SACROILIAC JOINT Bilateral 2/21/2018     Procedure: INJECT SACROILIAC JOINT;  SacroIliac Joint Injection;  Surgeon: Berny Connelly MD;  Location: [facility] OR     INJECT SACROILIAC JOINT Bilateral 7/13/2018     Procedure: INJECT SACROILIAC JOINT;  Bilateral Sacroiliac Joint Injection;  Surgeon: Berny Connelly MD;  Location: [facility] OR     OPTICAL TRACKING SYSTEM FUSION SACRAL ILIAC Right 12/3/2018     Procedure: Stealth Assisted Right Minimally Invasive Sacroiliac Joint Fusion;   "Surgeon: Joel Hemphill MD;  Location: [facility] OR     SALPINGO OOPHORECTOMY,R/L/ARTIE        right oophorectomy for benign tumor            Allergies:       Allergies   Allergen Reactions     No Known Allergies           Meds:      Current Outpatient Medications   Medication     acetaminophen (TYLENOL) 325 MG tablet     Ascorbic Acid (VITAMIN C PO)     atorvastatin (LIPITOR) 20 MG tablet     calcium carbonate (OS-VALENTIN 500 MG [place]. CA) 500 MG tablet     Cholecalciferol (VITAMIN D) 1000 UNITS capsule     clonazePAM (KLONOPIN) 0.25 mg TABS half-tab     Escitalopram Oxalate (LEXAPRO PO)     hydrOXYzine (ATARAX) 25 MG tablet     IBUPROFEN PO     lisinopril (PRINIVIL/ZESTRIL) 10 MG tablet     MELATONIN PO     Omega-3 Fatty Acids (OMEGA 3) 1200 MG capsule     senna-docusate (SENOKOT-S/PERICOLACE) 8.6-50 MG tablet     diazepam (VALIUM) 5 MG tablet     oxyCODONE (ROXICODONE) 5 MG tablet      No current facility-administered medications for this visit.          Fam Hx:  Family History         Family History   Problem Relation Age of Onset     Depression Mother       Breast Cancer Mother       Gynecology Mother            of ovarian cancer     Hypertension Father       Cancer - colorectal Father           at age 64     Lipids Father       Dementia Father           mild            P/S Hx:  Social History            Tobacco Use     Smoking status: Never Smoker     Smokeless tobacco: Never Used   Substance Use Topics     Alcohol use: Yes       Comment: 3 drinks a week            Physical Exam:  Very pleasant, healthy appearing, alert, oriented x 3, cooperative.  Appropriate mood and affect.  Not in cardiorespiratory distress.  Ht 1.689 m (5' 6.5\")   Wt 89.8 kg (198 lb)   LMP 2012   BMI 31.48 kg/m    Normal upright posture.    Normal gait without assistive device.  No antalgia / imbalance.  Back: no deformity, no skin lesions.  Localizes pain at mid-low back  Tenderness: (+) midline, (+) paraspinal, (-) R and (+) L " PSIS.  ROM:  Pain with extension/flexion      Neuro Exam:  Motor: 5/5 strength for all muscle groups in RLE's. 4/5 strength for Left hip flexion and knee extension/flexion 2/2 pain, 5/5 Left EHL/FHL, TA/Gastrocnemius   Sensory:  Intact to light touch in both LE's.   Reflexes:  Knee 2+ bilat.  Ankle 2+ bilat.  (-) Babinski, (-) clonus.     Lower Extremity:  Equal leg lengths, full pulses, (-) atrophy / asymmetry.  Full painless passive knee and ankle motion.  Straight leg raise: (-) right, (+) left.  Hip impingement: (-) right, (-) left.  REBEKAH/Elmo's: (-) right, (+) left.                            Sacroiliac Joint Tests:                            TEST RIGHT LEFT   PSIS tenderness - -   Rachael finger sign - -   REBEKAH/Elmo - +   Thigh thrust - +                                                       Gapping -   Compression -   Sacral thrust -   Gaenslen's +

## 2019-04-03 NOTE — PROCEDURES
Transforaminal Epidural Steroid Injection    The patient s identity, the procedure to be performed and the specific site of the procedure was verified in accordance with [facility] Protocol.     Diagnosis:Lumbar Radiculopathy   Pre-procedure Pain Score: 4/10     Procedure Note:    Informed consent was obtained.  The patient was positioned comfortably in the Prone position and was prepped and draped in a sterile fashion.  There was no evidence of infection at the site of needle insertion.  The skin was anesthetized with 1% lidocaine and a spinal needle was then placed in the proximity of the neuroforamen under fluoroscopic guidance.  CSF was not aspirated, blood was not aspirated.  Placement was then confirmed at each level in two planes of reference with radio-opaque contrast.  The patient tolerated the procedure without complications and was observed for 30 minutes post-injection.      The patient was given discharge instructions and verbalizes understanding, including understanding of those signs and symptoms that would require emergency care.     Level(s):L5/S1  Laterality: right     Needle Type:   22g 5 inch       Medication:  60mg depomedrol  0ml Normal Saline,   1.5ml  1%  Lidocaine    Post Procedure Pain Score: 0/10    Counseling: Greater than 50% of this patient visit was spent in counseling the patient regarding the treatment of their pain, coordinating their overall treatment plan and assessing their progress.

## 2019-05-24 ASSESSMENT — ENCOUNTER SYMPTOMS
NERVOUS/ANXIOUS: 0
JOINT SWELLING: 0
MUSCLE WEAKNESS: 1
HEADACHES: 0
DECREASED CONCENTRATION: 0
MEMORY LOSS: 0
DISTURBANCES IN COORDINATION: 1
SPEECH CHANGE: 0
MUSCLE CRAMPS: 1
MYALGIAS: 1
INSOMNIA: 1
DIZZINESS: 0
LOSS OF CONSCIOUSNESS: 0
BACK PAIN: 1
TREMORS: 0
PARALYSIS: 0
NECK PAIN: 0
TINGLING: 1
WEAKNESS: 1
PANIC: 0
ARTHRALGIAS: 1
SEIZURES: 0
NUMBNESS: 0
STIFFNESS: 1
DEPRESSION: 1

## 2019-05-29 DIAGNOSIS — M53.3 SI (SACROILIAC) JOINT DYSFUNCTION: Primary | ICD-10-CM

## 2019-06-05 ENCOUNTER — ANCILLARY PROCEDURE (OUTPATIENT)
Dept: GENERAL RADIOLOGY | Facility: CLINIC | Age: 59
End: 2019-06-05
Attending: ORTHOPAEDIC SURGERY
Payer: COMMERCIAL

## 2019-06-05 ENCOUNTER — OFFICE VISIT (OUTPATIENT)
Dept: ORTHOPEDICS | Facility: CLINIC | Age: 59
End: 2019-06-05
Payer: COMMERCIAL

## 2019-06-05 VITALS — BODY MASS INDEX: 29.51 KG/M2 | HEIGHT: 67 IN | WEIGHT: 188 LBS

## 2019-06-05 DIAGNOSIS — M62.830 MUSCLE SPASM OF BACK: ICD-10-CM

## 2019-06-05 DIAGNOSIS — G57.01 PIRIFORMIS SYNDROME OF RIGHT SIDE: ICD-10-CM

## 2019-06-05 DIAGNOSIS — M53.3 SI (SACROILIAC) JOINT DYSFUNCTION: Primary | ICD-10-CM

## 2019-06-05 ASSESSMENT — MIFFLIN-ST. JEOR: SCORE: 1465.39

## 2019-06-05 NOTE — PROGRESS NOTES
"[facility]  Orthopedics  Joel Hemphill MD  2019     Name: Rosemarie Zabala  MRN: [id number]  Age: 59 year old  : 1960  Referring provider: Referred Self     Chief Complaint: RECHECK (follow up on injection with repeat imaging )    Date of Surgery: 12/3/2018    Procedure: right minimally invasive SI joint fusion    History of Present Illness:   Rosemarie Zabala is a 59 year old female 6 months status-post procedure above who presents for postoperative evaluation. I last evaluated the patient on 2019, at which time the patient reported she though the surgery was helpful but still endorsed some chronic midline low back pain that did not improve. See not for further details. We elected to continue physical therapy to work on strengthening her gluteal muscles.     Today, the patient reports she is still experiencing lower back pain. She rates this pain as a 7/10. She endorses that her previous L5-S1 injections gave relief for roughly 2 weeks. She endorses tenderness overlying the right piriformis muscle. She also experiences radicular pain down both legs when she is laying on her side. In physical therapy some of her pain was getting aggravated. She voices no further concerns at this time.     MIKO: 57.78   Yvjjxb05: 22  Pain scale 0-10: 7      Physical Examination:  Ht 1.702 m (5' 7\")   Wt 85.3 kg (188 lb)   LMP 2012   BMI 29.44 kg/m    Constitutional - Patient is healthy, well-nourished and appears stated age.  Respiratory - Patient is breathing normally and in no respiratory distress.  Skin - No suspicious rashes or lesions. Surgical incisions are well-healed.   Psychiatric - Normal mood and affect.  Cardiovascular - Peripheral pulses are normal.  Eyes - Visual acuity is normal to the written word.  ENT - Hearing intact to the spoken word.  Musculoskeletal - Non-antalgic gait without use of assistive devices. Tender along right piriformis muscle. Right PSIS tender, left PSIS nontender. " Left quadratus lumborum is nontender, right quadratus lumborum tenderness. Positive facet loading on the right.     Radiographs:   Radiographs of the lumbar spine - 2/3 views (06/05/2019):  No evidence of halo formation. No evidence of screws loosening. There is some motion at L4-L5 with flexion and extension but not grossly unstable. 2-3 mm degenerative spondylolisthesis at L4-L5.     Radiograph of the 6 foot standing spine (5/29/2019)  Early degenerative L4-L5 spondylolisthesis     MRI of the lumbar spine (2/24/2019)  Regarding numbering convention, there are 5 lumbar-type  vertebrae assumed for the purposes of this dictation.  The tip of the  conus medullaris is at  L1.  Regarding alignment, minimal grade 1  anterolisthesis of L4 on L5..  There is disc desiccation with mild  disc height narrowing L5-S1..  Regarding bone marrow signal intensity,  no abnormality is visualized on STIR images. Mild epidural  lipomatosis.  On a level by level basis:     T12-L1: Bilateral facet arthropathy. No spinal canal or neuroforaminal  stenosis.     L1-2: Bilateral facet hypertrophy. No spinal canal or neural foraminal  narrowing.     L2-3: Circumferential disc bulge. Mild spinal canal stenosis.  Bilateral facet arthropathy with facet joint effusion and ligamentum  flavum hypertrophy. Mild-to-moderate bilateral neural foraminal  narrowing.     L3-4: Circumferential disc bulge with bilateral facet arthropathy  mildly narrow the lateral recesses. The spinal canal is mildly  narrowed. Mild-to-moderate right and mild left neural foraminal  stenosis.     L4-5: Anterolisthesis of L4 with uncovering of the disc and marked  facet arthropathy causes mild-to-moderate spinal canal stenosis.  Mild-to-moderate bilateral foraminal stenosis.     L5-S1: Broad-based disc bulge but no spinal canal stenosis. Mild  bilateral foraminal narrowing..     Paraspinous tissues are within normal limits.    I have independently reviewed the above imaging  studies; the results were discussed with the patient.     L5-S1 S1 superior endplate limbus vertebra. Also Modic endplate changes.  Assessment:   59 year old female 6 months status post procedure above with early degenerative L4-L5 spondylolisthesis with right sided L4, L5, S1 facet arthropathy and piriformis syndrome and quadratus lumborum spasms.    Plan:   Reviewed above imaging with the patient that showed early degenerative L4-L5 spondylolisthesis. Referred to physical therapy to focus on quadratus lumborum and piriformis muscle stretching exercises. If this does not improve her symptoms we will consider a right sided L4-L5 transforaminal epidural injection. If this does not provide any long term relief she will follow-up with me to consider surgical intervention in regard to her L4-L5 spondylolisthesis.    Scribe Disclosure:  ISean, am serving as a scribe to document services personally performed by Joel Hemphill MD at this visit, based upon the provider's statements to me. All documentation has been reviewed by the aforementioned provider prior to being entered into the official medical record.    Answers for HPI/ROS submitted by the patient on 5/24/2019   General Symptoms: No  Skin Symptoms: No  HENT Symptoms: No  EYE SYMPTOMS: No  HEART SYMPTOMS: No  LUNG SYMPTOMS: No  INTESTINAL SYMPTOMS: No  URINARY SYMPTOMS: No  GYNECOLOGIC SYMPTOMS: No  BREAST SYMPTOMS: No  SKELETAL SYMPTOMS: Yes  BLOOD SYMPTOMS: No  NERVOUS SYSTEM SYMPTOMS: Yes  MENTAL HEALTH SYMPTOMS: Yes  Back pain: Yes  Muscle aches: Yes  Neck pain: No  Swollen joints: No  Joint pain: Yes  Bone pain: No  Muscle cramps: Yes  Muscle weakness: Yes  Joint stiffness: Yes  Bone fracture: No  Trouble with coordination: Yes  Dizziness or trouble with balance: No  Fainting or black-out spells: No  Memory loss: No  Headache: No  Seizures: No  Speech problems: No  Tingling: Yes  Tremor: No  Weakness: Yes  Difficulty walking: Yes  Paralysis:  No  Numbness: No  Nervous or Anxious: No  Depression: Yes  Trouble sleeping: Yes  Trouble thinking or concentrating: No  Mood changes: No  Panic attacks: No

## 2019-06-05 NOTE — NURSING NOTE
"Reason For Visit:   Chief Complaint   Patient presents with     RECHECK     follow up on injection with repeat imaging        Primary MD: Adeline Barron  Ref. MD: Self  Date of surgery: 12/3/18  Type of surgery: Dr. Hemphill .  Smoker: No  Request smoking cessation information: No    Ht 1.702 m (5' 7\")   Wt 85.3 kg (188 lb)   LMP 12/14/2012   BMI 29.44 kg/m      Pain Assessment  Patient Currently in Pain: Yes  0-10 Pain Scale: 7  Primary Pain Location: Back    Oswestry (MIKO) Questionnaire    OSWESTRY DISABILITY INDEX 5/24/2019   Count 9   Sum 26   Oswestry Score (%) 57.78   Some recent data might be hidden            Neck Disability Index (NDI) Questionnaire    No flowsheet data found.                Promis 10 Assessment    PROMIS 10 5/24/2019   In general, would you say your health is: Good   In general, would you say your quality of life is: Good   In general, how would you rate your physical health? Good   In general, how would you rate your mental health, including your mood and your ability to think? Good   In general, how would you rate your satisfaction with your social activities and relationships? Fair   In general, please rate how well you carry out your usual social activities and roles Fair   To what extent are you able to carry out your everyday physical activities such as walking, climbing stairs, carrying groceries, or moving a chair? Moderately   How often have you been bothered by emotional problems such as feeling anxious, depressed or irritable? Sometimes   How would you rate your fatigue on average? Moderate   How would you rate your pain on average?   0 = No Pain  to  10 = Worst Imaginable Pain 8   Global Physical Health Score : Raw Score -   Global Mental Health Score : Raw Score -   Total (Physical + Mental Health Score) -   In general, would you say your health is: 3   In general, would you say your quality of life is: 3   In general, how would you rate your physical health? 3   In " general, how would you rate your mental health, including your mood and your ability to think? 3   In general, how would you rate your satisfaction with your social activities and relationships? 2   In general, please rate how well you carry out your usual social activities and roles. (This includes activities at home, at work and in your community, and responsibilities as a parent, child, spouse, employee, friend, etc.) 2   To what extent are you able to carry out your everyday physical activities such as walking, climbing stairs, carrying groceries, or moving a chair? 3   In the past 7 days, how often have you been bothered by emotional problems such as feeling anxious, depressed, or irritable? 3   In the past 7 days, how would you rate your fatigue on average? 3   In the past 7 days, how would you rate your pain on average, where 0 means no pain, and 10 means worst imaginable pain? 8   Global Mental Health Score 11   Global Physical Health Score 11   PROMIS TOTAL - SUBSCORES 22   Some recent data might be hidden                Jeremi Rhodes ATC

## 2019-06-05 NOTE — LETTER
"2019       RE: Rosemarie Zabala  [address] [place] [address]     Dear Colleague,    Thank you for referring your patient, Rosemarie Zabala, to the [facility]. Please see a copy of my visit note below.    [facility]  Orthopedics  Joel Hemphill MD  2019     Name: Rosemarie Zabala  MRN: [id number]  Age: 59 year old  : 1960  Referring provider: Referred Self     Chief Complaint: RECHECK (follow up on injection with repeat imaging )    Date of Surgery: 12/3/2018    Procedure: right minimally invasive SI joint fusion    History of Present Illness:   Rosmearie Zabala is a 59 year old female 6 months status-post procedure above who presents for postoperative evaluation. I last evaluated the patient on 2019, at which time the patient reported she though the surgery was helpful but still endorsed some chronic midline low back pain that did not improve. See not for further details. We elected to continue physical therapy to work on strengthening her gluteal muscles.     Today, the patient reports she is still experiencing lower back pain. She rates this pain as a 7/10. She endorses that her previous L5-S1 injections gave relief for roughly 2 weeks. She endorses tenderness overlying the right piriformis muscle. She also experiences radicular pain down both legs when she is laying on her side. In physical therapy some of her pain was getting aggravated. She voices no further concerns at this time.     MIKO: 57.78   Brftll40: 22  Pain scale 0-10: 7      Physical Examination:  Ht 1.702 m (5' 7\")   Wt 85.3 kg (188 lb)   LMP 2012   BMI 29.44 kg/m     Constitutional - Patient is healthy, well-nourished and appears stated age.  Respiratory - Patient is breathing normally and in no respiratory distress.  Skin - No suspicious rashes or lesions. Surgical incisions are well-healed.   Psychiatric - Normal mood and " affect.  Cardiovascular - Peripheral pulses are normal.  Eyes - Visual acuity is normal to the written word.  ENT - Hearing intact to the spoken word.  Musculoskeletal - Non-antalgic gait without use of assistive devices. Tender along right piriformis muscle. Right PSIS tender, left PSIS nontender. Left quadratus lumborum is nontender, right quadratus lumborum tenderness. Positive facet loading on the right.     Radiographs:   Radiographs of the lumbar spine - 2/3 views (06/05/2019):  No evidence of halo formation. No evidence of screws loosening. There is some motion at L4-L5 with flexion and extension but not grossly unstable. 2-3 mm degenerative spondylolisthesis at L4-L5.     Radiograph of the 6 foot standing spine (5/29/2019)  Early degenerative L4-L5 spondylolisthesis     MRI of the lumbar spine (2/24/2019)  Regarding numbering convention, there are 5 lumbar-type  vertebrae assumed for the purposes of this dictation.  The tip of the  conus medullaris is at  L1.  Regarding alignment, minimal grade 1  anterolisthesis of L4 on L5..  There is disc desiccation with mild  disc height narrowing L5-S1..  Regarding bone marrow signal intensity,  no abnormality is visualized on STIR images. Mild epidural  lipomatosis.  On a level by level basis:     T12-L1: Bilateral facet arthropathy. No spinal canal or neuroforaminal  stenosis.     L1-2: Bilateral facet hypertrophy. No spinal canal or neural foraminal  narrowing.     L2-3: Circumferential disc bulge. Mild spinal canal stenosis.  Bilateral facet arthropathy with facet joint effusion and ligamentum  flavum hypertrophy. Mild-to-moderate bilateral neural foraminal  narrowing.     L3-4: Circumferential disc bulge with bilateral facet arthropathy  mildly narrow the lateral recesses. The spinal canal is mildly  narrowed. Mild-to-moderate right and mild left neural foraminal  stenosis.     L4-5: Anterolisthesis of L4 with uncovering of the disc and marked  facet arthropathy  causes mild-to-moderate spinal canal stenosis.  Mild-to-moderate bilateral foraminal stenosis.     L5-S1: Broad-based disc bulge but no spinal canal stenosis. Mild  bilateral foraminal narrowing..     Paraspinous tissues are within normal limits.    I have independently reviewed the above imaging studies; the results were discussed with the patient.     L5-S1 S1 superior endplate limbus vertebra. Also Modic endplate changes.  Assessment:   59 year old female 6 months status post procedure above with early degenerative L4-L5 spondylolisthesis with right sided L4, L5, S1 facet arthropathy and piriformis syndrome and quadratus lumborum spasms.    Plan:   Reviewed above imaging with the patient that showed early degenerative L4-L5 spondylolisthesis. Referred to physical therapy to focus on quadratus lumborum and piriformis muscle stretching exercises. If this does not improve her symptoms we will consider a right sided L4-L5 transforaminal epidural injection. If this does not provide any long term relief she will follow-up with me to consider surgical intervention in regard to her L4-L5 spondylolisthesis.    Scribe Disclosure:  I, Sean Wilson, am serving as a scribe to document services personally performed by Joel Hemphill MD at this visit, based upon the provider's statements to me. All documentation has been reviewed by the aforementioned provider prior to being entered into the official medical record.    Answers for HPI/ROS submitted by the patient on 5/24/2019   General Symptoms: No  Skin Symptoms: No  HENT Symptoms: No  EYE SYMPTOMS: No  HEART SYMPTOMS: No  LUNG SYMPTOMS: No  INTESTINAL SYMPTOMS: No  URINARY SYMPTOMS: No  GYNECOLOGIC SYMPTOMS: No  BREAST SYMPTOMS: No  SKELETAL SYMPTOMS: Yes  BLOOD SYMPTOMS: No  NERVOUS SYSTEM SYMPTOMS: Yes  MENTAL HEALTH SYMPTOMS: Yes  Back pain: Yes  Muscle aches: Yes  Neck pain: No  Swollen joints: No  Joint pain: Yes  Bone pain: No  Muscle cramps: Yes  Muscle weakness:  Yes  Joint stiffness: Yes  Bone fracture: No  Trouble with coordination: Yes  Dizziness or trouble with balance: No  Fainting or black-out spells: No  Memory loss: No  Headache: No  Seizures: No  Speech problems: No  Tingling: Yes  Tremor: No  Weakness: Yes  Difficulty walking: Yes  Paralysis: No  Numbness: No  Nervous or Anxious: No  Depression: Yes  Trouble sleeping: Yes  Trouble thinking or concentrating: No  Mood changes: No  Panic attacks: No      Again, thank you for allowing me to participate in the care of your patient.      Sincerely,    Joel Hemphill MD

## 2019-06-20 ENCOUNTER — TRANSFERRED RECORDS (OUTPATIENT)
Dept: HEALTH INFORMATION MANAGEMENT | Facility: CLINIC | Age: 59
End: 2019-06-20

## 2019-07-09 ENCOUNTER — TELEPHONE (OUTPATIENT)
Dept: ANESTHESIOLOGY | Facility: CLINIC | Age: 59
End: 2019-07-09

## 2019-07-09 NOTE — TELEPHONE ENCOUNTER
Health Call Center    Phone Message    May a detailed message be left on voicemail: yes    Reason for Call: INJECTION - patient would like to schedule an injection, orders are in Epic. If possible patient would like to request Dr. Michael Camarena again as she stated she got the most relief.      Action Taken: Message routed to:  [facility]

## 2019-07-10 ENCOUNTER — TELEPHONE (OUTPATIENT)
Dept: ORTHOPEDICS | Facility: CLINIC | Age: 59
End: 2019-07-10

## 2019-07-10 DIAGNOSIS — M43.10 DEGENERATIVE SPONDYLOLISTHESIS: Primary | ICD-10-CM

## 2019-07-10 NOTE — TELEPHONE ENCOUNTER
[facility]    Phone Message    May a detailed message be left on voicemail: yes    Reason for Call: Other: per pt- would like a call back from jyotsna to discuss a referral to the pain clinic for an injection, please call pt thanks     Action Taken: Message routed to:  [facility] ortho.  See phone message.  See dictation of June 2019.  I called pt back & told her I will order  Procedure only referral for pain clinic Right L4-5 Transforaminal epidural steroid injection.  She has pain clinic ph# to call to schedule.  Call  back prn.  Pt agreed.  W.O./Jyotsna Jean RN. ,    
gradual onset

## 2019-07-10 NOTE — TELEPHONE ENCOUNTER
I called and spoke with the patient and informed her that she needs to be seen in clinic for a follow up in clinic. Patient stated that Dr. Hemphill would put in a procedure order if the patient wanted. I informed the patient that was okay, but just to make sure that the levels are specified as well.    Patient stated verbal understanding and had no further questions or concerns.    Lay Onofre, [facility]

## 2019-07-11 ENCOUNTER — CARE COORDINATION (OUTPATIENT)
Dept: ANESTHESIOLOGY | Facility: CLINIC | Age: 59
End: 2019-07-11

## 2019-07-11 ENCOUNTER — TELEPHONE (OUTPATIENT)
Dept: ANESTHESIOLOGY | Facility: CLINIC | Age: 59
End: 2019-07-11

## 2019-07-11 DIAGNOSIS — M54.16 LUMBAR RADICULOPATHY: Primary | ICD-10-CM

## 2019-07-11 NOTE — TELEPHONE ENCOUNTER
Attempted to reach out to patient to discuss scheduling her direct procedure referral from Dr. Hemphill. Left detailed message with first available of 8/2. Left best call back number of [phone]

## 2019-07-11 NOTE — TELEPHONE ENCOUNTER
[facility]    Phone Message    May a detailed message be left on voicemail: yes    Reason for Call: Other: Order received on chart for Procedure or injection only - please call the [facility] at [phone] to schedule: Epidural Steroid (transforaminal approach): Lumbar Right L4-5 .  Please reach out to patient via cell [phone]     Action Taken: Message routed to:  [facility]

## 2019-07-11 NOTE — TELEPHONE ENCOUNTER
Spoke with: Rosemarie     Date Scheduled: 8/13/19     Provider: Dr. Camarena     : Yes     F/U Appointment: n/a     Patient was educated on pre-op nurse call: yes    Direct procedure: yes

## 2019-07-12 ENCOUNTER — HOSPITAL ENCOUNTER (OUTPATIENT)
Facility: AMBULATORY SURGERY CENTER | Age: 59
End: 2019-07-12
Payer: COMMERCIAL

## 2019-08-07 ENCOUNTER — TELEPHONE (OUTPATIENT)
Dept: ANESTHESIOLOGY | Facility: CLINIC | Age: 59
End: 2019-08-07

## 2019-08-07 NOTE — TELEPHONE ENCOUNTER
I called and left a message for the patient informing her that her procedure time on 08/13 with Dr. Camarena is at 830AM with an arrival time of 730AM.    If you have any further questions or concerns you can call [phone].    Lay Onofre, [facility]

## 2019-08-26 ENCOUNTER — HOSPITAL ENCOUNTER (OUTPATIENT)
Dept: MAMMOGRAPHY | Facility: CLINIC | Age: 59
Discharge: HOME | End: 2019-08-26
Attending: PHYSICIAN ASSISTANT | Admitting: PHYSICIAN ASSISTANT
Payer: COMMERCIAL

## 2019-08-26 DIAGNOSIS — Z12.31 VISIT FOR SCREENING MAMMOGRAM: ICD-10-CM

## 2019-08-26 PROCEDURE — 77063 BREAST TOMOSYNTHESIS BI: CPT

## 2019-08-28 ENCOUNTER — TELEPHONE (OUTPATIENT)
Dept: ANESTHESIOLOGY | Facility: CLINIC | Age: 59
End: 2019-08-28

## 2019-08-28 NOTE — TELEPHONE ENCOUNTER
"[facility] called pt to discuss prior authorization denial and information requested by insurance.      Pt reports that her previous Right Lumbar Transforaminal Epidural Steroid Injection, Lumbar 4-5   3/12/19 with Dr. Camarena was significantly beneficial for her pain and functionality.     Purpose of call: Follow up after Right Lumbar Transforaminal Epidural Steroid Injection, Lumbar 4-5      Date of service: 3/12/19  Spoke with: Rosemarie     Location of pain: Low back   Percentage of pain relief after procedure: At least 75-80%  Duration of pain relief: 16 days.     Pt reports improvement in her ability to walk and perform other daily activities that would otherwise be limited due to her pain.  During the 16 days she experienced 75-80% pain relief, she reports traveling, during which time she \"walked a lot more than usual\" and believes she was able to do this primarily due to the positive effects of the injection.      Approximately around the time of injection and after, she reports she had to stop taking tylenol, secondary to elevated liver function tests.  Pt also stopped taking narcotic pain medication around the same time as well.  Pt states that the injection has been more beneficial for her pain than over the counter and prescription pain medication historically has been.      Isabela Tamez, RN, BSN       "

## 2019-08-29 ENCOUNTER — TELEPHONE (OUTPATIENT)
Dept: ANESTHESIOLOGY | Facility: CLINIC | Age: 59
End: 2019-08-29

## 2019-08-29 NOTE — TELEPHONE ENCOUNTER
Spoke with: Rosemarie     Date Scheduled: 9/18/19    Procedure Time: 8:50    Arrival Time:  9:50     Provider: Dr. Camarena     : yes     F/U Appointment: n/a     Patient was educated on pre-op nurse call: yes      Direct procedure:  no

## 2019-08-29 NOTE — TELEPHONE ENCOUNTER
Attempted to reach out to patient to discuss rescheduling injection now that her insurance company has overturned her denial. Informed patient that first available would be 9/16 with Dr. Frances otherwise Dr. Camarena first available would be 9/18. Left best call back number of [phone]

## 2019-08-29 NOTE — TELEPHONE ENCOUNTER
Clinic received a call from pt's insurance stating that the denial decision of [place] for procedure has been overturned and provided approval number.  [facility] forwarded approval information along to [place] with [place] team.  I spoke with the pt, and she is aware of the approval.  I have requested that our procedure  reach out to pt to assist with making appointment for injection.     Isabela Tamez, RN, BSN

## 2019-09-18 ENCOUNTER — ANCILLARY PROCEDURE (OUTPATIENT)
Dept: RADIOLOGY | Facility: AMBULATORY SURGERY CENTER | Age: 59
End: 2019-09-18
Payer: COMMERCIAL

## 2019-09-18 ENCOUNTER — HOSPITAL ENCOUNTER (OUTPATIENT)
Facility: AMBULATORY SURGERY CENTER | Age: 59
End: 2019-09-18
Payer: COMMERCIAL

## 2019-09-18 VITALS
HEIGHT: 67 IN | HEART RATE: 63 BPM | SYSTOLIC BLOOD PRESSURE: 137 MMHG | RESPIRATION RATE: 15 BRPM | TEMPERATURE: 97.1 F | OXYGEN SATURATION: 98 % | WEIGHT: 167 LBS | BODY MASS INDEX: 26.21 KG/M2 | DIASTOLIC BLOOD PRESSURE: 80 MMHG

## 2019-09-18 DIAGNOSIS — R52 PAIN: ICD-10-CM

## 2019-09-18 RX ORDER — AMLODIPINE BESYLATE 5 MG/1
5 TABLET ORAL DAILY
COMMUNITY

## 2019-09-18 RX ORDER — DEXAMETHASONE SODIUM PHOSPHATE 10 MG/ML
INJECTION INTRAMUSCULAR; INTRAVENOUS PRN
Status: DISCONTINUED | OUTPATIENT
Start: 2019-09-18 | End: 2019-09-18 | Stop reason: HOSPADM

## 2019-09-18 RX ORDER — IOPAMIDOL 408 MG/ML
INJECTION, SOLUTION INTRAVASCULAR PRN
Status: DISCONTINUED | OUTPATIENT
Start: 2019-09-18 | End: 2019-09-18 | Stop reason: HOSPADM

## 2019-09-18 RX ORDER — LIDOCAINE HYDROCHLORIDE 10 MG/ML
INJECTION, SOLUTION EPIDURAL; INFILTRATION; INTRACAUDAL; PERINEURAL PRN
Status: DISCONTINUED | OUTPATIENT
Start: 2019-09-18 | End: 2019-09-18 | Stop reason: HOSPADM

## 2019-09-18 ASSESSMENT — MIFFLIN-ST. JEOR: SCORE: 1362.2

## 2019-09-18 NOTE — DISCHARGE INSTRUCTIONS
Home Care Instructions after an Epidural Steroid Pain Injection    A lumbar epidural steroid injection delivers steroid medication directly into the area that may be causing your lower back pain and/or leg pain. A cervical or thoracic epidural steroid injection delivers steroids into the epidural space surrounding spinal nerve roots to help relieve pain in the upper spine/neck.    Activity  -Rest today  -Do not work today  -Resume normal activity tomorrow  -DO NOT shower for 24 hours  -DO NOT remove bandaid for 24 hours    Pain  -You may experience soreness at the injection site for one or two days  -You may use an ice pack for 20 minutes every 2 hours for the first 24 hours  -You may use a heating pad after the first 24 hours  -You may use Tylenol (acetaminophen) every 4 hours or other pain medicines as     directed by your physician    You may experience numbness radiating into your legs or arms (depending on the procedure location). This numbness may last several hours. Until sensation returns to normal; please use caution in walking, climbing stairs, and stepping out of your vehicle, etc.    DID YOU RECEIVE SEDATION TODAY?  No    Safety  Sedation medicine, if given, may remain active for many hours. It is important for the next 24 hours that you do not:  -Drive a car  -Operate machines or power tools  -Consume alcohol, including beer  -Sign any important papers or legal documents      Common side effects of steroids:  Not everyone will experience corticosteroid side effects. If side effects are experienced, they will gradually subside in the 7-10 day period following an injection. Most common side effects include:  -Flushed face and/or chest  -Feeling of warmth, particularly in the face but could be an overall feeling of warmth  -Increased blood sugar in diabetic patients  -Menstrual irregularities my occur. If taking hormone-based birth control an alternate method of birth control is recommended  -Sleep  disturbances and/or mood swings are possible  -Leg cramps    Please contact us if you have:  -Severe pain  -Fever more than 101.5 degrees Fahrenheit  -Signs of infection at the injection site (redness, swelling, or drainage)    If you have questions, please contact our office at [phone] between the hours of 7:00 am and 3:00 pm Monday through Friday. After office hours you can contact the on call provider by dialing [phone]. If you need immediate attention, we recommend that you go to a hospital emergency room or dial [phone].

## 2019-09-30 ENCOUNTER — HEALTH MAINTENANCE LETTER (OUTPATIENT)
Age: 59
End: 2019-09-30

## 2019-09-30 NOTE — H&P
Rosemarie Zabala  [id number]  female  59 year old      Reason for procedure/surgery: Lumbar Radiculopathy    Patient Active Problem List   Diagnosis     Genital herpes     HYPERLIPIDEMIA LDL GOAL <130     Generalized anxiety disorder     Lumbar radiculopathy     SI (sacroiliac) joint dysfunction       Past Surgical History:    Past Surgical History:   Procedure Laterality Date     ARTHROSCOPY KNEE  2003    L Knee ACL repair     C REMOVAL OF KIDNEY STONE  1994    L Kidney stone     C/SECTION, LOW TRANSVERSE      x  2     CHOLECYSTECTOMY, LAPOROSCOPIC  2007    precancerous changes and gallstones     COLONOSCOPY  2012    NL. repeat 5 years + family history cancer     COLONOSCOPY N/A 5/23/2017    Procedure: COLONOSCOPY;  COLONOSCOPY;  Surgeon: Declan Gonzales MD;  Location:  GI     COSMETIC ABDOMINOPLASTY  2013     HC REDUCTION OF LARGE BREAST       INJECT EPIDURAL TRANSFORAMINAL LUMBAR / SACRAL SINGLE Right 3/12/2019    Procedure: Right Lumbar 5-Sacral 1 Transforaminal Epidural Steroid Injection;  Surgeon: Michael Camarena MD;  Location[facility]     INJECT EPIDURAL TRANSFORAMINAL LUMBAR / SACRAL SINGLE Right 9/18/2019    Procedure: Right Lumbar Transforaminal Epidural Steroid Injection, Lumbar 4-5;  Surgeon: Michael Camarena MD;  Location[facility]     INJECT SACROILIAC JOINT Right 11/14/2017    Procedure: INJECT SACROILIAC JOINT;  Right Sacroiliac Joint Injection;  Surgeon: Christen Richardson MD;  [facility]     INJECT SACROILIAC JOINT Bilateral 2/21/2018    Procedure: INJECT SACROILIAC JOINT;  SacroIliac Joint Injection;  Surgeon: Berny Connelly MD;  Location[facility]     INJECT SACROILIAC JOINT Bilateral 7/13/2018    Procedure: INJECT SACROILIAC JOINT;  Bilateral Sacroiliac Joint Injection;  Surgeon: Berny Connelly MD;  [facility]     OPTICAL TRACKING SYSTEM FUSION SACRAL ILIAC Right 12/3/2018    Procedure: Stealth Assisted Right Minimally Invasive Sacroiliac  Joint Fusion;  Surgeon: Joel Hemphill MD;  Location: [facility]     SALPINGO OOPHORECTOMY,R/L/ARTIE      right oophorectomy for benign tumor       Past Medical History:   Past Medical History:   Diagnosis Date     Calculus of kidney      Essential hypertension, benign     resolved      Generalised anxiety disorder      Genital herpes      Left knee pain     left knee cortisone injection     Mixed hyperlipidemia      Other and unspecified malignant neoplasm of skin of other and unspecified parts of face     left cheek, treated with Aldara cream       Social History:   Social History     Tobacco Use     Smoking status: Never Smoker     Smokeless tobacco: Never Used   Substance Use Topics     Alcohol use: Yes     Comment: 3 drinks a week       Family History:   Family History   Problem Relation Age of Onset     Depression Mother      Breast Cancer Mother      Gynecology Mother          of ovarian cancer     Hypertension Father      Cancer - colorectal Father         at age 64     Lipids Father      Dementia Father         mild       Allergies:   Allergies   Allergen Reactions     No Known Allergies        Active Medications:   Current Outpatient Medications   Medication Sig Dispense Refill     amLODIPine (NORVASC) 5 MG tablet Take 5 mg by mouth daily       Ascorbic Acid (VITAMIN C PO) Take by mouth daily       atorvastatin (LIPITOR) 20 MG tablet TAKE ONE TABLET BY MOUTH ONE TIME DAILY (Patient taking differently: TAKE ONE TABLET 20MG BY MOUTH ONE TIME EVERY EVENING) 90 tablet 0     calcium carbonate (OS-VALENTIN 500 MG [place]. [place]) 500 MG tablet Take 1,000 mg by mouth daily       Cholecalciferol (VITAMIN D) 1000 UNITS capsule Take 1 capsule by mouth daily.       clonazePAM (KLONOPIN) 0.25 mg TABS half-tab Take 0.25 mg by mouth every morning        Escitalopram Oxalate (LEXAPRO PO) Take 30 mg by mouth every morning        IBUPROFEN PO Take 600 mg by mouth as needed        Omega-3 Fatty Acids (OMEGA 3)  "1200 MG capsule Take 1 capsule by mouth every morning        acetaminophen (TYLENOL) 325 MG tablet Take 1-2 tablets (325-650 mg) by mouth every 6 hours as needed for mild pain 50 tablet 0     oxyCODONE (ROXICODONE) 5 MG tablet Take 1-2 tabs every 4-6 hours as needed for pain 90 tablet 0     senna-docusate (SENOKOT-S/PERICOLACE) 8.6-50 MG tablet Take 1-2 tablets by mouth 2 times daily as needed for constipation (While on oral opioids.) 30 tablet 0       Systemic Review:   CONSTITUTIONAL: NEGATIVE for fever, chills, change in weight  ENT/MOUTH: NEGATIVE for ear, mouth and throat problems  RESP: NEGATIVE for significant cough or SOB  CV: NEGATIVE for chest pain, palpitations or peripheral edema    Physical Examination:   Vital Signs: /80   Pulse 63   Temp 97.1  F (36.2  C) (Temporal)   Resp 15   Ht 1.689 m (5' 6.5\")   Wt 75.8 kg (167 lb)   LMP 12/14/2012   SpO2 98%   BMI 26.55 kg/m    GENERAL: healthy, alert and no distress  NECK: no adenopathy, no asymmetry, masses, or scars  RESP: lungs clear to auscultation - no rales, rhonchi or wheezes  CV: regular rate and rhythm, normal S1 S2, no S3 or S4, no murmur, click or rub, no peripheral edema and peripheral pulses strong  ABDOMEN: soft, nontender, no hepatosplenomegaly, no masses and bowel sounds normal  MS: no gross musculoskeletal defects noted, no edema    Plan: Appropriate to proceed as scheduled.      Michael Camarena MD  9/29/2019          "

## 2019-10-01 NOTE — PROCEDURES
Transforaminal Epidural Steroid Injection    The patient s identity, the procedure to be performed and the specific site of the procedure was verified in accordance with [facility] Protocol.     Diagnosis:Lumbar Radiculopathy  Pre-procedure Pain Score: 6/10     Procedure Note:    Informed consent was obtained.  The patient was positioned comfortably in the Prone position and was prepped and draped in a sterile fashion.  There was no evidence of infection at the site of needle insertion.  The skin was anesthetized with 1% lidocaine and a spinal needle was then placed in the proximity of the neuroforamen under fluoroscopic guidance.  CSF was not aspirated, blood was not aspirated.  Placement was then confirmed at each level in two planes of reference with radio-opaque contrast.  The patient tolerated the procedure without complications and was observed for 30 minutes post-injection.      The patient was given discharge instructions and verbalizes understanding, including understanding of those signs and symptoms that would require emergency care.     Level(s):L4/5  Laterality: right     Needle Type:  22 gauge      Medication:  60mg depomedrol  0ml Normal Saline,   1.5ml  1%  Lidocaine    Post Procedure Pain Score: 0/10    Counseling: Greater than 50% of this patient visit was spent in counseling the patient regarding the treatment of their pain, coordinating their overall treatment plan and assessing their progress.

## 2019-10-23 ENCOUNTER — TELEPHONE (OUTPATIENT)
Dept: ANESTHESIOLOGY | Facility: CLINIC | Age: 59
End: 2019-10-23

## 2019-10-23 NOTE — TELEPHONE ENCOUNTER
[facility]    Phone Message    May a detailed message be left on voicemail: yes    Reason for Call: Other: Rosemarie is requesting a call back to discuss her level of pain. She states the injection she had has not helped and would like to discuss what to do next.      Action Taken: Message routed to:  [facility] Pain

## 2019-10-29 NOTE — TELEPHONE ENCOUNTER
RN[facility] called back pt.  Pt states that she did not get any pain relief from last injection DOS 9/18/19 with Dr. Camarena.  She reports that the most recent injection was changed from what she typically has, and she has achieved notable pain relief from previous injections.  We determined that the injection she has had previously that was beneficial for her was Right Lumbar 5-Sacral 1 Transforaminal Epidural Steroid Injection on 3/12/19.  Pt is requesting to repeat this exact procedure.  Pt advised that Dr. Camarena will be updated, and he is in clinic on Tuesday, at which time I will follow up with him regarding her request for procedure. She is agreeable to this plan.     Isabela Tamez RN, BSN

## 2019-11-05 DIAGNOSIS — M54.16 LUMBAR RADICULOPATHY: Primary | ICD-10-CM

## 2019-11-06 NOTE — TELEPHONE ENCOUNTER
RN[facility] called pt and left message with update.  Pt advised that Dr. Camarena is agreeable to her repeating TFESI L5-S1, and we will have  reach out once orders are in.  Call back number left for pt if needed.     Isabela Tamez RN, BSN

## 2019-11-07 ENCOUNTER — TELEPHONE (OUTPATIENT)
Dept: ANESTHESIOLOGY | Facility: CLINIC | Age: 59
End: 2019-11-07

## 2019-11-07 NOTE — TELEPHONE ENCOUNTER
Attempted to reach out to Rosemarie to discuss scheduling injection with Dr. Camarena. Left detailed message that I am currently waiting for Dr. Camarenas December schedule and once I receive that I can help her get scheduled. Left best call back number of [phone] if patient has any questions in the meantime.

## 2019-11-11 NOTE — TELEPHONE ENCOUNTER
Followed up with patient about scheduling with Dr. Camarena. Patient states that she spoke with Isabela and that patient would prefer to come back in clinic and establish care because she has not been seen in clinic since Dr. Richardson.

## 2020-03-15 ENCOUNTER — HEALTH MAINTENANCE LETTER (OUTPATIENT)
Age: 60
End: 2020-03-15

## 2021-01-15 ENCOUNTER — HEALTH MAINTENANCE LETTER (OUTPATIENT)
Age: 61
End: 2021-01-15

## 2021-09-30 ENCOUNTER — HOSPITAL ENCOUNTER (OUTPATIENT)
Dept: MAMMOGRAPHY | Facility: CLINIC | Age: 61
Discharge: HOME | End: 2021-09-30
Attending: PHYSICIAN ASSISTANT | Admitting: PHYSICIAN ASSISTANT
Payer: COMMERCIAL

## 2021-09-30 DIAGNOSIS — Z12.31 VISIT FOR SCREENING MAMMOGRAM: ICD-10-CM

## 2021-09-30 PROCEDURE — 77063 BREAST TOMOSYNTHESIS BI: CPT

## 2021-10-24 ENCOUNTER — HEALTH MAINTENANCE LETTER (OUTPATIENT)
Age: 61
End: 2021-10-24

## 2022-02-13 ENCOUNTER — HEALTH MAINTENANCE LETTER (OUTPATIENT)
Age: 62
End: 2022-02-13

## 2022-09-27 ENCOUNTER — HOSPITAL ENCOUNTER (OUTPATIENT)
Facility: CLINIC | Age: 62
Discharge: HOME | End: 2022-09-27
Attending: COLON & RECTAL SURGERY | Admitting: COLON & RECTAL SURGERY
Payer: COMMERCIAL

## 2022-09-27 VITALS
HEART RATE: 64 BPM | BODY MASS INDEX: 25.58 KG/M2 | RESPIRATION RATE: 13 BRPM | SYSTOLIC BLOOD PRESSURE: 131 MMHG | WEIGHT: 163 LBS | HEIGHT: 67 IN | DIASTOLIC BLOOD PRESSURE: 76 MMHG | OXYGEN SATURATION: 98 %

## 2022-09-27 LAB — COLONOSCOPY: NORMAL

## 2022-09-27 PROCEDURE — 45385 COLONOSCOPY W/LESION REMOVAL: CPT | Performed by: COLON & RECTAL SURGERY

## 2022-09-27 PROCEDURE — 88305 TISSUE EXAM BY PATHOLOGIST: CPT | Mod: 26

## 2022-09-27 PROCEDURE — 88305 TISSUE EXAM BY PATHOLOGIST: CPT | Mod: TC | Performed by: COLON & RECTAL SURGERY

## 2022-09-27 PROCEDURE — G0500 MOD SEDAT ENDO SERVICE >5YRS: HCPCS | Mod: PT | Performed by: COLON & RECTAL SURGERY

## 2022-09-27 PROCEDURE — 99153 MOD SED SAME PHYS/QHP EA: CPT | Performed by: COLON & RECTAL SURGERY

## 2022-09-27 PROCEDURE — 250N000011 HC RX IP 250 OP 636: Performed by: COLON & RECTAL SURGERY

## 2022-09-27 RX ORDER — FLUMAZENIL 0.1 MG/ML
0.2 INJECTION, SOLUTION INTRAVENOUS
Status: CANCELLED | OUTPATIENT
Start: 2022-09-27 | End: 2022-09-27

## 2022-09-27 RX ORDER — NALOXONE HYDROCHLORIDE 0.4 MG/ML
0.2 INJECTION, SOLUTION INTRAMUSCULAR; INTRAVENOUS; SUBCUTANEOUS
Status: CANCELLED | OUTPATIENT
Start: 2022-09-27

## 2022-09-27 RX ORDER — NALOXONE HYDROCHLORIDE 0.4 MG/ML
0.4 INJECTION, SOLUTION INTRAMUSCULAR; INTRAVENOUS; SUBCUTANEOUS
Status: CANCELLED | OUTPATIENT
Start: 2022-09-27

## 2022-09-27 RX ORDER — FENTANYL CITRATE 50 UG/ML
INJECTION, SOLUTION INTRAMUSCULAR; INTRAVENOUS PRN
Status: DISCONTINUED | OUTPATIENT
Start: 2022-09-27 | End: 2022-09-27 | Stop reason: HOSPADM

## 2022-09-27 RX ORDER — ONDANSETRON 4 MG/1
4 TABLET, ORALLY DISINTEGRATING ORAL EVERY 6 HOURS PRN
Status: CANCELLED | OUTPATIENT
Start: 2022-09-27

## 2022-09-27 RX ORDER — PROCHLORPERAZINE MALEATE 10 MG
10 TABLET ORAL EVERY 6 HOURS PRN
Status: CANCELLED | OUTPATIENT
Start: 2022-09-27

## 2022-09-27 RX ORDER — ONDANSETRON 2 MG/ML
4 INJECTION INTRAMUSCULAR; INTRAVENOUS EVERY 6 HOURS PRN
Status: CANCELLED | OUTPATIENT
Start: 2022-09-27

## 2022-09-27 RX ADMIN — FENTANYL CITRATE 100 MCG: 50 INJECTION, SOLUTION INTRAMUSCULAR; INTRAVENOUS at 09:23

## 2022-09-27 RX ADMIN — MIDAZOLAM 1 MG: 1 INJECTION INTRAMUSCULAR; INTRAVENOUS at 09:29

## 2022-09-27 RX ADMIN — MIDAZOLAM 2 MG: 1 INJECTION INTRAMUSCULAR; INTRAVENOUS at 09:23

## 2022-09-27 RX ADMIN — FENTANYL CITRATE 50 MCG: 50 INJECTION, SOLUTION INTRAMUSCULAR; INTRAVENOUS at 09:29

## 2022-09-27 ASSESSMENT — ACTIVITIES OF DAILY LIVING (ADL): ADLS_ACUITY_SCORE: 35

## 2022-09-28 LAB
PATH REPORT.COMMENTS IMP SPEC: NORMAL
PATH REPORT.COMMENTS IMP SPEC: NORMAL
PATH REPORT.FINAL DX SPEC: NORMAL
PATH REPORT.GROSS SPEC: NORMAL
PATH REPORT.MICROSCOPIC SPEC OTHER STN: NORMAL
PATH REPORT.RELEVANT HX SPEC: NORMAL
PHOTO IMAGE: NORMAL

## 2022-10-15 ENCOUNTER — HEALTH MAINTENANCE LETTER (OUTPATIENT)
Age: 62
End: 2022-10-15

## 2022-10-21 ENCOUNTER — HOSPITAL ENCOUNTER (OUTPATIENT)
Dept: MAMMOGRAPHY | Facility: CLINIC | Age: 62
Discharge: HOME | End: 2022-10-21
Attending: PHYSICIAN ASSISTANT | Admitting: PHYSICIAN ASSISTANT
Payer: COMMERCIAL

## 2022-10-21 DIAGNOSIS — Z12.31 VISIT FOR SCREENING MAMMOGRAM: ICD-10-CM

## 2022-10-21 PROCEDURE — 77067 SCR MAMMO BI INCL CAD: CPT

## 2023-03-26 ENCOUNTER — HEALTH MAINTENANCE LETTER (OUTPATIENT)
Age: 63
End: 2023-03-26

## 2023-12-12 ENCOUNTER — HOSPITAL ENCOUNTER (OUTPATIENT)
Dept: MAMMOGRAPHY | Facility: CLINIC | Age: 63
Discharge: HOME | End: 2023-12-12
Attending: PHYSICIAN ASSISTANT | Admitting: PHYSICIAN ASSISTANT
Payer: COMMERCIAL

## 2023-12-12 DIAGNOSIS — Z12.31 VISIT FOR SCREENING MAMMOGRAM: ICD-10-CM

## 2023-12-12 PROCEDURE — 77067 SCR MAMMO BI INCL CAD: CPT

## 2024-05-26 ENCOUNTER — HEALTH MAINTENANCE LETTER (OUTPATIENT)
Age: 64
End: 2024-05-26

## 2024-12-31 ENCOUNTER — HOSPITAL ENCOUNTER (OUTPATIENT)
Dept: MAMMOGRAPHY | Facility: CLINIC | Age: 64
Discharge: HOME | End: 2024-12-31
Attending: PHYSICIAN ASSISTANT
Payer: COMMERCIAL

## 2024-12-31 DIAGNOSIS — Z12.31 VISIT FOR SCREENING MAMMOGRAM: ICD-10-CM

## 2024-12-31 PROCEDURE — 77067 SCR MAMMO BI INCL CAD: CPT

## 2024-12-31 PROCEDURE — 77063 BREAST TOMOSYNTHESIS BI: CPT

## 2025-06-14 ENCOUNTER — HEALTH MAINTENANCE LETTER (OUTPATIENT)
Age: 65
End: 2025-06-14

## (undated) DEVICE — Device

## (undated) DEVICE — GLOVE PROTEXIS BLUE W/NEU-THERA 7.0  2D73EB70

## (undated) DEVICE — NDL SPINAL 22GA 3.5" QUINCKE 405181

## (undated) DEVICE — GLOVE PROTEXIS W/NEU-THERA 7.0  2D73TE70

## (undated) DEVICE — PREP POVIDONE IODINE USP 10% TOPICAL SOL 64537161

## (undated) DEVICE — GLOVE PROTEXIS POWDER FREE SMT 6.5  2D72PT65X

## (undated) DEVICE — LINEN TOWEL PACK X5 5464

## (undated) DEVICE — PIN PERCUTANEOUS NAVIGATION FOR SPINE O-ARM 100MM 9733235

## (undated) DEVICE — TUBING IV EXT SET MICRO VOLUME MALE LL ADAPTER 36" 2N3345

## (undated) DEVICE — TRAY PAIN INJECTION 97A 640

## (undated) DEVICE — NDL 18GAX1.5" 305185

## (undated) DEVICE — MARKER SPHERZ PACK 5

## (undated) DEVICE — SU VICRYL 0 CT-2 27" J334H

## (undated) DEVICE — POSITIONER ARMBOARD FOAM 1PAIR LF FP-ARMB1

## (undated) DEVICE — PREP DURAPREP 26ML APL 8630

## (undated) DEVICE — PREP CHLORAPREP W/ORANGE TINT 10.5ML 260715

## (undated) DEVICE — STPL SKIN 35W ROTATING HEAD PRW35

## (undated) DEVICE — PREP SKIN SCRUB TRAY 4461A

## (undated) DEVICE — SYR 10ML LL W/O NDL

## (undated) DEVICE — LABEL MEDICATION SYSTEM 3303-P

## (undated) DEVICE — GLOVE PROTEXIS POWDER FREE SMT 8.0  2D72PT80X

## (undated) DEVICE — SYR 03ML LL W/O NDL 309657

## (undated) DEVICE — ESU GROUND PAD UNIVERSAL W/O CORD

## (undated) DEVICE — DRSG PRIMAPORE 02X3" 7133

## (undated) DEVICE — SYR 05ML LL W/O NDL

## (undated) DEVICE — DRAPE POUCH INSTRUMENT 1018

## (undated) DEVICE — NDL SPINAL 22GA 5" QUINCKE 405148

## (undated) DEVICE — DRSG BANDAID 1X3" FABRIC CURITY LATEX FREE KC44101

## (undated) DEVICE — SOL WATER IRRIG 1000ML BOTTLE 2F7114

## (undated) DEVICE — RX SURGIFLO HEMOSTATIC MATRIX W/THROMBIN 8ML NEXTGEN 2993

## (undated) DEVICE — DRAPE BACK TABLE  44X90" 8377

## (undated) DEVICE — GLOVE ESTEEM POWDER FREE SMT 7.5  2D72PT75

## (undated) DEVICE — ESU ELEC BLADE 6" COATED E1450-6

## (undated) DEVICE — BASIN SET MAJOR

## (undated) DEVICE — PREP DURAPREP REMOVER 4OZ 8611

## (undated) DEVICE — SU DERMABOND ADVANCED .7ML DNX12

## (undated) DEVICE — NDL 25GA 1.5" 305127

## (undated) DEVICE — LINEN BACK PACK 5440

## (undated) DEVICE — DRSG GAUZE 4X4" 2187

## (undated) DEVICE — NDL COUNTER 20CT 31142493

## (undated) DEVICE — SU VICRYL 2-0 CT-2 27" UND J269H

## (undated) DEVICE — GLOVE PROTEXIS POWDER FREE 8.5 ORTHOPEDIC 2D73ET85

## (undated) DEVICE — PREP POVIDONE IODINE SOLUTION 10% 120ML

## (undated) DEVICE — DRSG AQUACEL AG 3.5X6.0" HYDROFIBER 412010

## (undated) DEVICE — DRAPE IOBAN INCISE 36X23" 6651EZ

## (undated) DEVICE — NDL 27GA 1.25" 305136

## (undated) DEVICE — GOWN IMPERVIOUS ZONED XLG 9041

## (undated) DEVICE — DRAPE STERI TOWEL LG 1010

## (undated) DEVICE — PACK UNIVERSAL SPLIT 29131

## (undated) DEVICE — SUCTION MANIFOLD DORNOCH ULTRA CART UL-CL500

## (undated) DEVICE — SPONGE SURGIFOAM 100 1974

## (undated) DEVICE — SYR 50ML LL W/O NDL 309653

## (undated) DEVICE — SOL NACL 0.9% IRRIG 1000ML BOTTLE 2F7124

## (undated) DEVICE — SU MONOCRYL 4-0 PS-2 18" UND Y496G

## (undated) DEVICE — GLOVE PROTEXIS W/NEU-THERA 8.0  2D73TE80

## (undated) DEVICE — DRAPE POUCH IRR 1016

## (undated) DEVICE — DRAPE O ARM TUBE 9732722

## (undated) RX ORDER — BUPIVACAINE HYDROCHLORIDE AND EPINEPHRINE 2.5; 5 MG/ML; UG/ML
INJECTION, SOLUTION EPIDURAL; INFILTRATION; INTRACAUDAL; PERINEURAL
Status: DISPENSED
Start: 2018-12-03

## (undated) RX ORDER — CEFAZOLIN SODIUM 2 G/100ML
INJECTION, SOLUTION INTRAVENOUS
Status: DISPENSED
Start: 2018-12-03

## (undated) RX ORDER — FENTANYL CITRATE 50 UG/ML
INJECTION, SOLUTION INTRAMUSCULAR; INTRAVENOUS
Status: DISPENSED
Start: 2018-07-13

## (undated) RX ORDER — LIDOCAINE HYDROCHLORIDE 20 MG/ML
INJECTION, SOLUTION EPIDURAL; INFILTRATION; INTRACAUDAL; PERINEURAL
Status: DISPENSED
Start: 2018-12-03

## (undated) RX ORDER — FENTANYL CITRATE 50 UG/ML
INJECTION, SOLUTION INTRAMUSCULAR; INTRAVENOUS
Status: DISPENSED
Start: 2022-09-27

## (undated) RX ORDER — HYDROMORPHONE HYDROCHLORIDE 1 MG/ML
INJECTION, SOLUTION INTRAMUSCULAR; INTRAVENOUS; SUBCUTANEOUS
Status: DISPENSED
Start: 2018-12-03

## (undated) RX ORDER — HYDROXYZINE HYDROCHLORIDE 25 MG/1
TABLET, FILM COATED ORAL
Status: DISPENSED
Start: 2018-12-03

## (undated) RX ORDER — FENTANYL CITRATE 50 UG/ML
INJECTION, SOLUTION INTRAMUSCULAR; INTRAVENOUS
Status: DISPENSED
Start: 2017-05-23

## (undated) RX ORDER — OXYCODONE HYDROCHLORIDE 5 MG/1
TABLET ORAL
Status: DISPENSED
Start: 2018-12-03

## (undated) RX ORDER — FENTANYL CITRATE 50 UG/ML
INJECTION, SOLUTION INTRAMUSCULAR; INTRAVENOUS
Status: DISPENSED
Start: 2018-12-03

## (undated) RX ORDER — DEXAMETHASONE SODIUM PHOSPHATE 4 MG/ML
INJECTION, SOLUTION INTRA-ARTICULAR; INTRALESIONAL; INTRAMUSCULAR; INTRAVENOUS; SOFT TISSUE
Status: DISPENSED
Start: 2018-12-03

## (undated) RX ORDER — ACETAMINOPHEN 325 MG/1
TABLET ORAL
Status: DISPENSED
Start: 2018-12-03